# Patient Record
Sex: FEMALE | Race: WHITE | NOT HISPANIC OR LATINO | Employment: UNEMPLOYED | URBAN - METROPOLITAN AREA
[De-identification: names, ages, dates, MRNs, and addresses within clinical notes are randomized per-mention and may not be internally consistent; named-entity substitution may affect disease eponyms.]

---
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
## 2022-01-10 ENCOUNTER — TELEPHONE (OUTPATIENT)
Dept: OBGYN CLINIC | Facility: MEDICAL CENTER | Age: 22
End: 2022-01-10

## 2022-01-10 NOTE — PROGRESS NOTES
Subjective     Devi Groves is a 25 y o  y o  female  who presents for a postpartum visit  She is 6 weeks postpartum following a spontaneous vaginal delivery on 21  Pregnancy complicated by pregnancy while on medroxyprogesterone, asthma, bipolar, anxiety, tension headaches and gHTN  The delivery was at 44 gestational weeks  Labor and delivery was complicated by bilateral labial and first-degree laceration with repair  Female infant weighing 8 lb 8 5 oz with Apgars of 9/9  Postpartum course has been uncomplicated  Baby's course has been uncomplicated  Baby is feeding by formula  Bleeding no bleeding  Bowel function is normal  Bladder function is normal  Patient is sexually active  Contraception method is None  Postpartum depression screening: negative  Patient is already back to work  Is interested in discussing options for contraception  The following portions of the patient's history were reviewed and updated as appropriate: allergies, current medications, past family history, past medical history, past social history, past surgical history and problem list     Review of Systems  Pertinent items are noted in HPI       Objective     /78   Wt 156lb     General:   appears stated age, cooperative, alert normal mood and affect   Neck: Neck: normal, supple,trachea midline, no masses   Heart: regular rate and rhythm, S1, S2 normal, no murmur, click, rub or gallop   Lungs: clear to auscultation bilaterally     Assessment/Plan     6 week  postpartum exam    Pap smear due at age 24    4  Contraception: Reviewed all forms of contraception with patient  Patient is interested in restarting Depo-Provera injections  Reviewed with patient given pregnancy while on Depo-Provera would recommend other form of contraception  Patient is not interested in any other form at this time    Reviewed with patient will give Depo-Provera injections every 10 weeks instead of typical 12 weeks to attempt to prevent pregnancy      - UPT negative in office today     - will return in 10 weeks for next injection  Written information provided  2  Patient verbalizes understanding of support and [facility] opportunities available [facility]  Written information provided  3  Signs and symptoms to report reviewed    Follow up in: 10 weeks for Depo-Provera injection or as needed

## 2022-01-10 NOTE — PATIENT INSTRUCTIONS
Safe Sex Practices   WHAT YOU NEED TO KNOW:   What are safe sex practices? Safe sex practices are ways to prevent pregnancy and the spread of sexually transmitted infections (STIs)  An STI happens when a virus or bacteria are spread through sexual activity  Safe sex practices help decrease or prevent body fluid exchange during sex  Body fluids include saliva, urine, blood, vaginal fluids, and semen  Oral, vaginal, and anal sex can all spread STIs  What are some safe sex practices to follow before I have sex? · Talk to a new partner before you have sex  Tell your partner if you have an STI  Ask about his or her sex history and if he or she has a current or past STI  Your partner may need to be tested and treated  Do not have sex while you are being treated for an STI, or with a partner who is being treated  · Limit your number of sex partners  More than one sex partner can increase your risk for an STI  Do not have sex with anyone whose sex history you do not know  · Get tested for STIs if needed  Get tested if you had sex with someone who has an STI  Get tested if you have unprotected sex with any new partner  · Talk to your healthcare provider about birth control  Birth control can help prevent an unwanted pregnancy  There are many different types of birth control  Talk to your healthcare provider about which birth control method is right for you  · Ask about medicines to lower your risk for some STIs:      ? Vaccines  can help protect you from hepatitis A, hepatitis B, and the human papillomavirus (HPV)  The HPV vaccine is usually given at 11 years, but it may be given through 26 years to both females and males  Your provider can give you more information on vaccines to prevent STIs  ? Pre-exposure prophylaxis (PrEP)  may be given if you are at high risk for HIV  PrEP is taken every day to prevent the virus from fully infecting the body  · Do not use alcohol or drugs before sex    These can prevent you from thinking clearly and increase your risk for unsafe sex  What are some safe sex practices to follow while I am having sex? · Use condoms and barrier methods for all types of sexual contact  This includes oral, vaginal, and anal sex  Male and female condoms are available  Make sure that the condom fits and is put on correctly  Rubber latex sheets or dental dams can be used for oral sex  Use a new condom or latex barrier each time you have sex  Use latex condoms, if possible  Lambskin or natural condoms do not prevent STIs  If you or your partner is allergic to latex, use a nonlatex product, such as polyurethane  Use a second form of birth control with the condom to prevent pregnancy and STIs  Do not use male and female condoms together  · Only use water-based lubricants during sex  Water-based lubricants help prevent sores or cuts in the vagina or on the penis  Prevent sores or cuts to decrease your risk for an STI  Do not use oil-based lubricants, such as baby oil or hand lotion, with latex condoms or barriers  These will weaken the latex and may cause the condom to break  · Do not use chemicals that irritate your skin  Products that contain chemical irritants, such as spermicides, can irritate the lining of your vagina or rectum  Irritation may cause sores that can increase your risk for an STI  · Be careful when you have sex if you have open sores or cuts  Open sores or cuts may increase your risk for an STI  Keep all open sores or cuts covered during sex  Do not have oral sex if you have cuts or sores in your mouth  · Do not do activities that can pass germs  Do not use saliva as a lubricant or share sex toys  Where can I find more information? · [address] [address] (STEPHANE)  P O  [facility] , [address] [place] [address]  Web Address: http://www Moment/  org    When should I seek immediate care?    · A condom breaks, leaks, or slips off while you are having sex  · You notice sores on your penis, vagina, anal area, or the skin around them  · You have had unsafe sex and want to discuss emergency contraception or treatment for STI exposure  When should I call my doctor? · You think you or your female sex partner might be pregnant  · You have questions or concerns about your condition or care  CARE AGREEMENT:   You have the right to help plan your care  Learn about your health condition and how it may be treated  Discuss treatment options with your healthcare providers to decide what care you want to receive  You always have the right to refuse treatment  The above information is an  only  It is not intended as medical advice for individual conditions or treatments  Talk to your doctor, nurse or pharmacist before following any medical regimen to see if it is safe and effective for you  © Copyright [employer] 2021 Information is for End User's use only and may not be sold, redistributed or otherwise used for commercial purposes  All illustrations and images included in CareNotes® are the copyrighted property of A D A M , Inc  or [facility] Jimmy Yanes   Postpartum Depression   WHAT YOU NEED TO KNOW:   What is postpartum depression? Postpartum depression is a mood disorder that occurs after your baby is born  Your symptoms may last up to 12 months after delivery  Your symptoms may become serious and affect your daily activities and relationships  What are the symptoms of postpartum depression?    · Feeling sad, anxious, tearful, discouraged, hopeless, or alone    · Thoughts that you are not a good mother    · Trouble completing daily tasks, concentrating, or remembering things    · Lack of appetite    · Lack interest in your baby    · Feeling restless, irritable, or withdrawn    · An overwhelmed feeling with your new baby and a belief that it will not get better    · Feeling unimportant or guilty most of the time    · Trouble sleeping, even after the baby is asleep    What causes postpartum depression? The exact cause is not known  Hormone levels that increased during pregnancy suddenly drop after your baby is born  This can cause your symptoms  A past episode of postpartum depression or a family history of depression may increase your risk  Postpartum depression may be triggered by giving birth to more than 1 baby  Postpartum depression may also be trigged by a lack of support at home, stress, or medical problems  How is postpartum depression diagnosed? Healthcare providers will talk to you about how you are feeling and ask if you have any depression  These talks can happen during appointments for your medical care and for your baby's care, such as well child visits  Talk to your providers about the following:  · When you started to feel depressed, and if it is getting worse over time    · Problems you are having with daily activities, sleep, or caring for your baby    · If anything makes your depression worse, or makes you feel better    · Feeling that you are not bonding with your baby the way you want    · Any problems your baby has with sleeping or feeding    · If your baby is fussy or cries a lot    · Support you have from friends, family, or others    How is postpartum depression treated? Treatment may include medicine, talk therapy, or both  · A therapist  can help you find ways to cope with your feelings  This can be done alone or in a group  · Antidepressants  help decrease or stop your symptoms  What can I do to feel better? You may feel better quickly, or if may take a few weeks to feel better  Be patient with yourself  Do the following to take care of yourself:  · Rest as needed  Take a nap or rest while your baby sleeps  Ask someone to watch your baby while you nap  · Get emotional support  Share your feelings with your partner, a friend, or another mother   Ask your partner, friends, or family to help with cooking or cleaning  Do only what is needed and let other things wait until later  · Exercise when you can  Even 5 or 10 minutes of exercise can help improve your mood  Walk around the block or do some stretching exercises  · Eat a variety of healthy foods  Healthy foods include fruits, vegetables, whole-grain breads, low-fat dairy products, beans, lean meats, and fish  Do not skip meals  · Take care of yourself  Shower and dress each day  Write in a journal  Celebrate small achievements, even if it is only 1 thing a day  Try to get out of the house a little each day  Meet a friend for coffee  Call your local emergency number (911 in the [address]) if:   · You think about hurting yourself or your baby  When should I seek immediate care? · Your feelings of depression or sadness are strong  Call your doctor if:   · Your symptoms last most of the day for days in a row  · Your symptoms last more than 1 week  · You have questions or concerns about your condition or care  CARE AGREEMENT:   You have the right to help plan your care  Learn about your health condition and how it may be treated  Discuss treatment options with your healthcare providers to decide what care you want to receive  You always have the right to refuse treatment  The above information is an  only  It is not intended as medical advice for individual conditions or treatments  Talk to your doctor, nurse or pharmacist before following any medical regimen to see if it is safe and effective for you  © Copyright [employer] 2021 Information is for End User's use only and may not be sold, redistributed or otherwise used for commercial purposes  All illustrations and images included in CareNotes® are the copyrighted property of A D A M , Inc  or Waldemar Kraus  Postpartum Bleeding   WHAT YOU NEED TO KNOW:   What do I need to know about postpartum bleeding?   Postpartum bleeding is vaginal bleeding after childbirth  This bleeding is normal, whether your baby was born vaginally or by   It contains blood and the tissue that lined the inside of your uterus when you were pregnant  What should I expect with postpartum bleeding? Postpartum bleeding usually lasts at least 10 days, and may last longer than 6 weeks  Your bleeding may range from light (barely staining a pad) to heavy (soaking a pad in 1 hour)  Usually, you have heavier bleeding right after childbirth, which slows over the next few weeks until it stops  The bleeding is red or dark brown with clots for the first 1 to 3 days  It then turns pink for several days, and then becomes a white or yellow discharge until it ends  Call your local emergency number (911 in the [address]) if:   · You are suddenly short of breath and feel lightheaded  · You have sudden chest pain  · You are breathing faster than normal     · Your heart is beating faster than normal     When should I call my doctor or obstetrician? · Your bleeding increases, or you have heavy bleeding that soaks 1 pad in 1 hour for 2 hours in a row  · You have a fever  · You pass large blood clots  · You feel dizzy  · You have a low back ache, abdominal pain or tenderness, or loss of appetite  · You urinate less than usual, or not at all  · You have questions or concerns about your condition or care  CARE AGREEMENT:   You have the right to help plan your care  Learn about your health condition and how it may be treated  Discuss treatment options with your healthcare providers to decide what care you want to receive  You always have the right to refuse treatment  The above information is an  only  It is not intended as medical advice for individual conditions or treatments  Talk to your doctor, nurse or pharmacist before following any medical regimen to see if it is safe and effective for you    ©  [employer]  Information is for End User's use only and may not be sold, redistributed or otherwise used for commercial purposes  All illustrations and images included in CareNotes® are the copyrighted property of A D A M , Inc  or Waldemar Kraus  Methylprednisolone (By injection)   Methylprednisolone (meth-il-pred-NIS-oh-lone)  Treats inflammation, severe allergies, flare-ups of ongoing illnesses, and many other medical problems  May also be used to decrease some symptoms of cancer  This medicine is a corticosteroid (cortisone-like medicine or steroid)  Brand Name(s): DEPO-Medrol, DEPO-Medrol Novaplus, PremierPro Rx SOLU-Medrol 125mg Vial, PremierPro Rx SOLU-Medrol [address] [facility], PremierPro Rx SOLU-Medrol 40mg Vial, SOLU-Medrol, SOLU-Medrol 125mg Vial, SOLU-Medrol 1GM Vial, SOLU-Medrol 40mg Vial, SOLU-Medrol 500mg Vial, SOLU-Medrol Novaplus 125mg Vial, SOLU-Medrol Novaplus 40mg Vial   There may be other brand names for this medicine  When This Medicine Should Not Be Used: This medicine is not right for everyone  You should not receive it if you had an allergic reaction to methylprednisolone, cow's milk or other dairy products, or if you have a fungus infection that affects your whole body  You should not have this medicine injected into a muscle if you have idiopathic thrombocytopenic purpura  Depo-Medrol® and some strengths of Solu-Medrol® contain benzyl alcohol and should not be used in premature babies  How to Use This Medicine:   Injectable  A nurse or other trained health professional will give you this medicine  It may be given through a needle placed into one of your veins, as a shot into a muscle or joint, or as a shot into a lesion on your skin  A nurse or other health provider will give you this medicine  Your doctor may give you a few doses of this medicine until your condition improves, then switch you to an oral medicine that works the same way  If you have any concerns about this, talk to your doctor  Missed dose:  You must use this medicine on a fixed schedule  Call your doctor or pharmacist if you miss a dose  Drugs and Foods to Avoid:   Ask your doctor or pharmacist before using any other medicine, including over-the-counter medicines, vitamins, and herbal products  Some medicines can affect how methylprednisolone medicine works  Tell your doctor if you are using any of the following:  Aminoglutethimide, amphotericin B, azithromycin, carbamazepine, cholestyramine, clarithromycin, cyclosporine, digoxin, erythromycin, isoniazid, ketoconazole, phenobarbital, phenytoin, rifampin, troleandomycin  Birth control pills (including estrogen)  Blood thinner (including warfarin)  Diabetes medicine  Diuretic (water pill)  NSAID pain or arthritis medicine (including aspirin, celecoxib, diclofenac, ibuprofen, naproxen)  This medicine may interfere with vaccines  Ask your doctor before you get a flu shot or any other vaccines  Warnings While Using This Medicine:   Tell your doctor if you are pregnant or breastfeeding, or if you have kidney disease, liver disease, adrenal gland problems, cataracts, congestive heart failure, diabetes, glaucoma, high blood pressure, mental health problems, stomach or bowel problems (including ulcers, ulcerative colitis), myasthenia gravis, osteoporosis, thyroid problems, brain injury, or a recent heart attack  Tell your doctor if you have an infection (including herpes eye infection, tuberculosis, or threadworm), or if you have recently spent time in a tropical climate  Also tell your doctor if you have a recent exposure to chickenpox or measles  If this medicine is being injected into a joint, tell your doctor about any other problems you have had with that joint    This medicine may cause the following problems:  Increased risk of infection  Changes in vision  Changes in mood or behavior  Slow growth in children  High blood pressure  Liver problems  Bone problems (including osteoporosis)  Stomach or bowel perforation (tear or hole)  Increased risk for cancer (including Kaposi's sarcoma)  Muscle problems  If you use this medicine for a long time, tell your doctor about any extra stress or anxiety in your life, including other health concerns and emotional stress  Your dose might need to be changed for a short time while you have extra stress  Do not stop using this medicine suddenly  Your doctor will need to slowly decrease your dose before you stop it completely  Tell any doctor or dentist who treats you that you are using this medicine  This medicine may affect certain medical test results  Your doctor will do lab tests at regular visits to check on the effects of this medicine  Keep all appointments  Possible Side Effects While Using This Medicine:   Call your doctor right away if you notice any of these side effects: Allergic reaction: Itching or hives, swelling in your face or hands, swelling or tingling in your mouth or throat, chest tightness, trouble breathing  Blurred vision, eye pain, changes in vision  Changes in how much or how often you urinate, increased hunger or thirst  Color changes on the skin, dark freckles, easy bruising, muscle weakness, round, puffy face  Dark urine or pale stools, nausea, vomiting, loss of appetite, stomach pain, yellow skin or eyes  Dry mouth, increased thirst, nausea, vomiting  Fast, slow, pounding, or uneven heartbeat  Fever, chills, cough, sore throat, body aches  Mood swings, unusual thoughts or behavior  Muscle pain, weakness, or cramps, sudden joint pain  Swelling in your hands, ankles, or feet  Unusual bleeding or bruising  If you notice these less serious side effects, talk with your doctor:   Diarrhea  Easy bruising  Red, pink, purple, or brown flat spots or bumps on your skin  Skin looks sunken or indented where the shot was given  If you notice other side effects that you think are caused by this medicine, tell your doctor  Call your doctor for medical advice about side effects  You may report side effects to FDA at 5-340-FDA-0430    © Copyright [employer] [facility] 2021 Information is for End User's use only and may not be sold, redistributed or otherwise used for commercial purposes  The above information is an  only  It is not intended as medical advice for individual conditions or treatments  Talk to your doctor, nurse or pharmacist before following any medical regimen to see if it is safe and effective for you

## 2022-01-10 NOTE — TELEPHONE ENCOUNTER
Patient's mom called in regards to concerns of patient  Pt has had heavy bleeding and clotting the last few days and mother stated pt is using "pad after pad after pad" to keep up with the amount of bleeding  Pt did wake up this morning with a fever and feeling dizzy  Consulted with Dr Isaac Landry who suggested patient be seen in ED to evaluate blood loss and fever  Pt agreed to this treatment plan

## 2022-01-12 ENCOUNTER — POSTPARTUM VISIT (OUTPATIENT)
Dept: OBGYN CLINIC | Facility: MEDICAL CENTER | Age: 22
End: 2022-01-12
Payer: COMMERCIAL

## 2022-01-12 VITALS
BODY MASS INDEX: 25.07 KG/M2 | SYSTOLIC BLOOD PRESSURE: 116 MMHG | HEIGHT: 66 IN | WEIGHT: 156 LBS | DIASTOLIC BLOOD PRESSURE: 78 MMHG

## 2022-01-12 DIAGNOSIS — Z30.013 ENCOUNTER FOR INITIAL PRESCRIPTION OF INJECTABLE CONTRACEPTIVE: ICD-10-CM

## 2022-01-12 PROBLEM — Z34.03 SUPERVISION OF NORMAL FIRST TEEN PREGNANCY IN THIRD TRIMESTER: Status: RESOLVED | Noted: 2021-06-15 | Resolved: 2022-01-12

## 2022-01-12 PROBLEM — O13.9 GESTATIONAL HYPERTENSION: Status: RESOLVED | Noted: 2021-12-02 | Resolved: 2022-01-12

## 2022-01-12 LAB — SL AMB POCT URINE HCG: NEGATIVE

## 2022-01-12 PROCEDURE — 99213 OFFICE O/P EST LOW 20 MIN: CPT | Performed by: NURSE PRACTITIONER

## 2022-01-12 PROCEDURE — 1036F TOBACCO NON-USER: CPT | Performed by: NURSE PRACTITIONER

## 2022-01-12 PROCEDURE — 3008F BODY MASS INDEX DOCD: CPT | Performed by: NURSE PRACTITIONER

## 2022-01-12 PROCEDURE — 81025 URINE PREGNANCY TEST: CPT | Performed by: NURSE PRACTITIONER

## 2022-01-12 RX ORDER — MEDROXYPROGESTERONE ACETATE 150 MG/ML
150 INJECTION, SUSPENSION INTRAMUSCULAR SEE ADMIN INSTRUCTIONS
Status: DISCONTINUED | OUTPATIENT
Start: 2022-01-12 | End: 2022-06-09

## 2022-01-12 RX ORDER — MEDROXYPROGESTERONE ACETATE 150 MG/ML
INJECTION, SUSPENSION INTRAMUSCULAR
Qty: 1 ML | Refills: 0 | Status: SHIPPED | OUTPATIENT
Start: 2022-01-12 | End: 2022-03-23 | Stop reason: SDUPTHER

## 2022-01-12 RX ORDER — MEDROXYPROGESTERONE ACETATE 150 MG/ML
150 INJECTION, SUSPENSION INTRAMUSCULAR SEE ADMIN INSTRUCTIONS
Status: CANCELLED | OUTPATIENT
Start: 2022-01-12

## 2022-01-12 RX ADMIN — MEDROXYPROGESTERONE ACETATE 150 MG: 150 INJECTION, SUSPENSION INTRAMUSCULAR at 12:56

## 2022-02-08 ENCOUNTER — POSTPARTUM VISIT (OUTPATIENT)
Dept: OBGYN CLINIC | Facility: MEDICAL CENTER | Age: 22
End: 2022-02-08
Payer: COMMERCIAL

## 2022-02-08 VITALS
SYSTOLIC BLOOD PRESSURE: 100 MMHG | WEIGHT: 155 LBS | BODY MASS INDEX: 24.91 KG/M2 | HEIGHT: 66 IN | DIASTOLIC BLOOD PRESSURE: 70 MMHG

## 2022-02-08 DIAGNOSIS — M62.89 PELVIC FLOOR DYSFUNCTION: Primary | ICD-10-CM

## 2022-02-08 PROCEDURE — 99214 OFFICE O/P EST MOD 30 MIN: CPT | Performed by: OBSTETRICS & GYNECOLOGY

## 2022-02-08 PROCEDURE — 3008F BODY MASS INDEX DOCD: CPT | Performed by: NURSE PRACTITIONER

## 2022-02-09 NOTE — PROGRESS NOTES
[facility] [address] [place] [address]    Assessment/Plan:  No problem-specific Assessment & Plan notes found for this encounter  Diagnoses and all orders for this visit:    Pelvic floor dysfunction  -     Ambulatory Referral to Physical Therapy; Future          Subjective:   Edilberto Agarwal is a 25 y o  [place] female  CC: pelvic pressure    HPI: HPI  Patient is a 72-year-old  presenting with complaints of vaginal pressure  She has states she feels as though something is falling out  She states she cannot feel any tissue protruding from the vagina however has the sensation  She denies any urinary or bowel movement complaints  ROS: Review of Systems   Constitutional: Negative  HENT: Negative  Eyes: Negative  Respiratory: Negative  Cardiovascular: Negative  Gastrointestinal: Negative  Genitourinary: Pelvic pain: Pelvic pressure  Musculoskeletal: Negative  All other systems reviewed and are negative  PFSH: The following portions of the patient's history were reviewed and updated as appropriate: allergies, current medications, past family history, past medical history, obstetric history, gynecologic history, past social history, past surgical history and problem list        Objective:  /70   Ht 5' 5 5" (1 664 m)   Wt 70 3 kg (155 lb)   LMP 2022   Breastfeeding No   BMI 25 40 kg/m²    Physical Exam  Vitals reviewed  Constitutional:       Appearance: Normal appearance  Cardiovascular:      Rate and Rhythm: Normal rate  Pulmonary:      Effort: Pulmonary effort is normal  No respiratory distress  Genitourinary:     General: Normal vulva  Exam position: Lithotomy position  Urethra: No prolapse  Vagina: No prolapsed vaginal walls        Cervix: Normal       Uterus: Normal        Adnexa: Right adnexa normal and left adnexa normal       Comments: Decrease muscle tone  Neurological:      Mental Status: She is alert    Psychiatric:         Mood and Affect: Mood normal          Behavior: Behavior normal

## 2022-03-01 ENCOUNTER — AMB VIDEO VISIT (OUTPATIENT)
Dept: OTHER | Facility: HOSPITAL | Age: 22
End: 2022-03-01

## 2022-03-01 DIAGNOSIS — J03.90 EXUDATIVE TONSILLITIS: Primary | ICD-10-CM

## 2022-03-01 PROBLEM — Z20.822 ENCOUNTER BY TELEHEALTH FOR SUSPECTED COVID-19: Status: RESOLVED | Noted: 2021-09-24 | Resolved: 2022-03-01

## 2022-03-01 PROBLEM — J01.40 ACUTE NON-RECURRENT PANSINUSITIS: Status: RESOLVED | Noted: 2021-09-24 | Resolved: 2022-03-01

## 2022-03-01 PROBLEM — Z30.013 ENCOUNTER FOR INITIAL PRESCRIPTION OF INJECTABLE CONTRACEPTIVE: Status: RESOLVED | Noted: 2022-01-12 | Resolved: 2022-03-01

## 2022-03-01 PROCEDURE — ECARE PR SL URGENT CARE VIRTUAL VISIT: Performed by: PHYSICIAN ASSISTANT

## 2022-03-01 RX ORDER — AMOXICILLIN 500 MG/1
500 TABLET, FILM COATED ORAL 2 TIMES DAILY
Qty: 20 TABLET | Refills: 0 | Status: SHIPPED | OUTPATIENT
Start: 2022-03-01 | End: 2022-03-11

## 2022-03-01 NOTE — CARE ANYWHERE EVISITS
Visit Summary for Uday Burgess - Gender: Female - Date of Birth: [birth year]  Date: [id number] - Duration: 15 minutes  Patient: Uday Burgess  Provider: Callie Manning PA-C    Patient Contact Information  Address  [address] [place] [address]; [address] [place] [address]  [phone]    Visit Topics  Strep throat [Added By: Self - 2022-03-01]    Triage Questions   What is your current physical address in the event of a medical emergency? Answer []  Are you allergic to any medications? Answer []  Are you now or could you be pregnant? Answer []  Do you have any immune system compromise or chronic lung   disease? Answer []  Do you have any vulnerable family members in the home (infant, pregnant, cancer, elderly)? Answer []     Conversation Transcripts  [0A][0A] [Notification] You are connected with Callie Manning PA-C, Urgent Care Specialist [0A][Notification] Uday Burgess is located in [address] [place] [address]  [0A][Notification] Uday Burgess has shared health history  [place] Kim  [0A]    Diagnosis  Acute tonsillitis, unspecified    Procedures  Value: [id number] Code: CPT-4 UNLISTED E&M SERVICE    Medications Prescribed    No prescriptions ordered    Electronically signed by: Norma Garcia(NPI [id number])

## 2022-03-01 NOTE — PROGRESS NOTES
Video Visit - Edilberto Agarwal 25 y o  female MRN: [id number]    REQUIRED DOCUMENTATION:         1  This service was provided via [employer]  2  Provider located at [address] [place], [place] [id number]  3  [facility] provider: Prince Abelardo PA-C   4  Identify all parties in room with patient during [facility] visit:  significant other-permission granted and child(fely)- permission granted  5  After connecting through [employer], patient was identified by name and date of birth  Patient was then informed that this was a Telemedicine visit and that the exam was being conducted confidentially over secure lines  My office door was closed  No one else was in the room  Patient acknowledged consent and understanding of privacy and security of the Telemedicine visit  I informed the patient that I have reviewed their record in Epic and presented the opportunity for them to ask any questions regarding the visit today  The patient agreed to participate  VITALS: Heart Rate: 80 BPM, Respiratory Rate: 20 RPM, Temperature 99 9° F, Blood Pressure Unavailable mmHg, Pulse Ox unavailable % on RA    HPI  Pt reports R tonsil pain and filled with pus and pain radiated into ear starting 4 days ago, now pain and pus on the L side  Feeling feverish  Took advil 30 min ago with some relief  Able to eat and drink and breath  Reports shooting pain behind leg similar to her prior flu/covid sx (body aches), headache  No hx DVT  Not a smoker  2 months post-partum  Physical Exam  Constitutional:       General: She is not in acute distress  Appearance: Normal appearance  She is not toxic-appearing  HENT:      Head: Normocephalic and atraumatic  Nose: No rhinorrhea  Mouth/Throat:      Mouth: Mucous membranes are moist       Pharynx: Uvula midline  No oropharyngeal exudate, posterior oropharyngeal erythema or uvula swelling  Tonsils: Tonsillar exudate present  2+ on the right  2+ on the left  Eyes:      Conjunctiva/sclera: Conjunctivae normal    Pulmonary:      Effort: Pulmonary effort is normal  No respiratory distress  Breath sounds: No wheezing (no gross audible wheeze through computer)  Musculoskeletal:      Cervical back: Normal range of motion  Lymphadenopathy:      Cervical: Cervical adenopathy (anterior, denies posterior) present  Skin:     Findings: No rash (on face or neck)  Neurological:      Mental Status: She is alert  Cranial Nerves: No dysarthria or facial asymmetry  Psychiatric:         Mood and Affect: Mood normal          Behavior: Behavior normal          Diagnoses and all orders for this visit:    Exudative tonsillitis  -     amoxicillin (AMOXIL) 500 MG tablet; Take 1 tablet (500 mg total) by mouth 2 (two) times a day for 10 days  -     Throat culture; Future  -     Mononucleosis screen; Future      Patient Instructions     Go to [facility] for Throat Culture and Mono test    Go to the emergency department if you have worsening swelling, difficulty swallowing, or difficulty breathing  Please schedule follow-up appointment with your primary care physician within the next 1-2 business days for recheck  Tonsillitis   WHAT YOU NEED TO KNOW:   Tonsillitis is inflammation of your tonsils  Tonsils are the lumps of tissue on both sides of the back of your throat  Tonsils are part of your immune system  They help you fight infections  Recurrent tonsillitis is when you have tonsillitis many times in 1 year  Chronic tonsillitis is when you have a sore throat that lasts 3 months or longer  DISCHARGE INSTRUCTIONS:   Medicines: You may need any of the following:  · Acetaminophen  decreases pain and fever  It is available without a doctor's order  Ask how much to take and how often to take it  Follow directions  Acetaminophen can cause liver damage if not taken correctly  · NSAIDs , such as ibuprofen, help decrease swelling, pain, and fever   This medicine is available with or without a doctor's order  NSAIDs can cause stomach bleeding or kidney problems in certain people  If you take blood thinner medicine, always ask your healthcare provider if NSAIDs are safe for you  Always read the medicine label and follow directions  · Antibiotics  help treat a bacterial infection  · Take your medicine as directed  Contact your healthcare provider if you think your medicine is not helping or if you have side effects  Tell him or her if you are allergic to any medicine  Keep a list of the medicines, vitamins, and herbs you take  Include the amounts, and when and why you take them  Bring the list or the pill bottles to follow-up visits  Carry your medicine list with you in case of an emergency  Call 911 for the following:   · You have trouble breathing because your tonsils are swollen  Contact your healthcare provider if:   · You have a fever  · Your pain gets worse or does not get better after you take pain medicine  · Your sore throat is not better after you have finished antibiotic treatment  · You have trouble sleeping and wake up trying to catch your breath  · You have questions or concerns about your condition or care  Rest  when you feel it is needed  Slowly start to do more each day  Return to your daily activities as directed  Drink liquids as directed: You may need to drink more liquid than usual to help prevent dehydration  Ask how much liquid to drink each day and which liquids are best for you  Gargle with warm salt water: This may help decrease throat pain  Mix 1 teaspoon of salt in 8 ounces of warm water  Ask how often you should do this  Prevent the spread of germs:  Wash your hands often  Do not share food or drinks with anyone  You may be able to return to work when you feel better and your fever is gone for at least 24 hours    Follow up with your doctor as directed:  Write down your questions so you remember to ask them during your visits  © Copyright [employer] 2022 Information is for End User's use only and may not be sold, redistributed or otherwise used for commercial purposes  All illustrations and images included in CareNotes® are the copyrighted property of [employer]  or Waldemar Kraus  The above information is an  only  It is not intended as medical advice for individual conditions or treatments  Talk to your doctor, nurse or pharmacist before following any medical regimen to see if it is safe and effective for you

## 2022-03-01 NOTE — PATIENT INSTRUCTIONS
Go to [facility] for Throat Culture and Mono test    Go to the emergency department if you have worsening swelling, difficulty swallowing, or difficulty breathing  Please schedule follow-up appointment with your primary care physician within the next 1-2 business days for recheck  Tonsillitis   WHAT YOU NEED TO KNOW:   Tonsillitis is inflammation of your tonsils  Tonsils are the lumps of tissue on both sides of the back of your throat  Tonsils are part of your immune system  They help you fight infections  Recurrent tonsillitis is when you have tonsillitis many times in 1 year  Chronic tonsillitis is when you have a sore throat that lasts 3 months or longer  DISCHARGE INSTRUCTIONS:   Medicines: You may need any of the following:  · Acetaminophen  decreases pain and fever  It is available without a doctor's order  Ask how much to take and how often to take it  Follow directions  Acetaminophen can cause liver damage if not taken correctly  · NSAIDs , such as ibuprofen, help decrease swelling, pain, and fever  This medicine is available with or without a doctor's order  NSAIDs can cause stomach bleeding or kidney problems in certain people  If you take blood thinner medicine, always ask your healthcare provider if NSAIDs are safe for you  Always read the medicine label and follow directions  · Antibiotics  help treat a bacterial infection  · Take your medicine as directed  Contact your healthcare provider if you think your medicine is not helping or if you have side effects  Tell him or her if you are allergic to any medicine  Keep a list of the medicines, vitamins, and herbs you take  Include the amounts, and when and why you take them  Bring the list or the pill bottles to follow-up visits  Carry your medicine list with you in case of an emergency  Call 911 for the following:   · You have trouble breathing because your tonsils are swollen        Contact your healthcare provider if:   · You have a fever     · Your pain gets worse or does not get better after you take pain medicine  · Your sore throat is not better after you have finished antibiotic treatment  · You have trouble sleeping and wake up trying to catch your breath  · You have questions or concerns about your condition or care  Rest  when you feel it is needed  Slowly start to do more each day  Return to your daily activities as directed  Drink liquids as directed: You may need to drink more liquid than usual to help prevent dehydration  Ask how much liquid to drink each day and which liquids are best for you  Gargle with warm salt water: This may help decrease throat pain  Mix 1 teaspoon of salt in 8 ounces of warm water  Ask how often you should do this  Prevent the spread of germs:  Wash your hands often  Do not share food or drinks with anyone  You may be able to return to work when you feel better and your fever is gone for at least 24 hours  Follow up with your doctor as directed:  Write down your questions so you remember to ask them during your visits  © Copyright [employer] 2022 Information is for End User's use only and may not be sold, redistributed or otherwise used for commercial purposes  All illustrations and images included in CareNotes® are the copyrighted property of [employer]  or [facility] Jimmy Yanes   The above information is an  only  It is not intended as medical advice for individual conditions or treatments  Talk to your doctor, nurse or pharmacist before following any medical regimen to see if it is safe and effective for you

## 2022-03-23 ENCOUNTER — CLINICAL SUPPORT (OUTPATIENT)
Dept: OBGYN CLINIC | Facility: MEDICAL CENTER | Age: 22
End: 2022-03-23
Payer: COMMERCIAL

## 2022-03-23 ENCOUNTER — TELEPHONE (OUTPATIENT)
Dept: OBGYN CLINIC | Facility: MEDICAL CENTER | Age: 22
End: 2022-03-23

## 2022-03-23 DIAGNOSIS — Z30.013 ENCOUNTER FOR INITIAL PRESCRIPTION OF INJECTABLE CONTRACEPTIVE: ICD-10-CM

## 2022-03-23 DIAGNOSIS — Z30.42 ENCOUNTER FOR MANAGEMENT AND INJECTION OF DEPO-PROVERA: Primary | ICD-10-CM

## 2022-03-23 LAB — SL AMB POCT URINE HCG: NORMAL

## 2022-03-23 PROCEDURE — 96372 THER/PROPH/DIAG INJ SC/IM: CPT | Performed by: OBSTETRICS & GYNECOLOGY

## 2022-03-23 PROCEDURE — 81025 URINE PREGNANCY TEST: CPT | Performed by: OBSTETRICS & GYNECOLOGY

## 2022-03-23 RX ORDER — MEDROXYPROGESTERONE ACETATE 150 MG/ML
INJECTION, SUSPENSION INTRAMUSCULAR
Qty: 1 ML | Refills: 0 | Status: SHIPPED | OUTPATIENT
Start: 2022-03-23 | End: 2022-03-23 | Stop reason: SDUPTHER

## 2022-03-23 RX ORDER — MEDROXYPROGESTERONE ACETATE 150 MG/ML
INJECTION, SUSPENSION INTRAMUSCULAR
Qty: 1 ML | Refills: 3 | Status: SHIPPED | OUTPATIENT
Start: 2022-03-23 | End: 2022-06-09 | Stop reason: ALTCHOICE

## 2022-03-23 RX ADMIN — MEDROXYPROGESTERONE ACETATE 150 MG: 150 INJECTION, SUSPENSION INTRAMUSCULAR at 12:56

## 2022-03-23 NOTE — PROGRESS NOTES
Nica Kalpana is here for her depo inj  Deop given im  LEFT DELTOID    UPT neg in office   Pt sat in office for 15 min before leaving with no issues  [id number]  [id number]  EXP-07/2024  PT SUPPLIED

## 2022-06-09 ENCOUNTER — ANNUAL EXAM (OUTPATIENT)
Dept: OBGYN CLINIC | Facility: MEDICAL CENTER | Age: 22
End: 2022-06-09
Payer: COMMERCIAL

## 2022-06-09 VITALS
BODY MASS INDEX: 26.97 KG/M2 | WEIGHT: 167.8 LBS | SYSTOLIC BLOOD PRESSURE: 116 MMHG | HEIGHT: 66 IN | DIASTOLIC BLOOD PRESSURE: 74 MMHG

## 2022-06-09 DIAGNOSIS — Z30.42 ENCOUNTER FOR MANAGEMENT AND INJECTION OF DEPO-PROVERA: ICD-10-CM

## 2022-06-09 DIAGNOSIS — Z30.011 ENCOUNTER FOR BCP (BIRTH CONTROL PILLS) INITIAL PRESCRIPTION: ICD-10-CM

## 2022-06-09 DIAGNOSIS — Z01.419 ENCOUNTER FOR GYNECOLOGICAL EXAMINATION WITHOUT ABNORMAL FINDING: Primary | ICD-10-CM

## 2022-06-09 PROCEDURE — 0503F POSTPARTUM CARE VISIT: CPT | Performed by: ADVANCED PRACTICE MIDWIFE

## 2022-06-09 PROCEDURE — 99213 OFFICE O/P EST LOW 20 MIN: CPT | Performed by: ADVANCED PRACTICE MIDWIFE

## 2022-06-09 RX ORDER — DROSPIRENONE AND ETHINYL ESTRADIOL 0.02-3(28)
1 KIT ORAL DAILY
Qty: 28 TABLET | Refills: 4 | Status: SHIPPED | OUTPATIENT
Start: 2022-06-09 | End: 2022-06-30

## 2022-06-09 NOTE — PROGRESS NOTES
Assessment Diagnoses and all orders for this visit:    Encounter for gynecological examination without abnormal finding    Encounter for management and injection of depo-Provera  - states that she wants to stop Depo  Reviewed options, see below  Encounter for BCP (birth control pills) initial prescription  -     drospirenone-ethinyl estradiol (ALY) 3-0 02 MG per tablet; Take 1 tablet by mouth daily    [facility] feels that an OCP is best option for current lifestyle  Risk/benefits, side effects and administration reviewed  Questions asked and answered  Verbalizes understanding  Reviewed starting of OCP and importance of taking on time daily  Verbalizes understanding    - To call if any problems  - RTO 3 months for follow-up  Plan  23 y o  female here to discuss stopping Depo Provera and starting a birth control pill       Matty Gallagher is a 23 y o  female who presents for contraception counseling  The patient does have complaints today  The patient is sexually active  Pertinent past medical history: none  [facility] presents for gyn exam today  5/25/22 LMP  Menses is heavy and then light and states that she is still bleeding  Has not used back-up birth control  Using DepoProvera as birth control method was supposed to use on a 10 week schedule instead of 12 weeks and is already 11 weeks  States that due to the bleeding and acne she would like to switch to a different birth control   Declined IUD  Reviewed all forms of birth control  Risk/benefit, side effects, and administration  At this time wants to start and OCP  5-8 hrs sleep per day  2-3 servings of calcium rich food per day  No routine exercise per week  3 servings of caffeine per day       Patient Active Problem List   Diagnosis    Allergic rhinitis    Asthma    Dysmenorrhea    Generalized anxiety disorder    Mood disorder (HCC)    Chronic tension-type headache, not intractable       Past Medical History:   Diagnosis Date    Asthma     Bipolar disorder ([facility] [address] )     Chlamydia     COVID-19 virus infection 05/19/2021    Migraine        Past Surgical History:   Procedure Laterality Date    TYMPANOPLASTY Right     TYMPANOSTOMY TUBE PLACEMENT         Family History   Problem Relation Age of Onset    Multiple sclerosis Mother     Thyroid disease Mother     Fibromyalgia Mother    Katja Sang Migraines Mother     Depression Mother     Kidney cancer Maternal Grandmother     Heart attack Maternal Grandmother     Lupus Maternal Grandmother     Skin cancer Maternal Grandmother     Heart disease Maternal Grandmother     Diabetes Maternal Grandfather     Diabetes Maternal Aunt     Polycystic ovary syndrome Maternal Aunt     Depression Maternal Aunt     Anxiety disorder Maternal Aunt     Cancer Maternal Uncle     Depression Father     Bipolar disorder Father     No Known Problems Paternal Grandmother     Cancer Paternal Grandfather     Asperger's syndrome Half-Brother     Suicidality Half-Brother     Mental illness Neg Hx     Substance Abuse Neg Hx     Breast cancer Neg Hx     Colon cancer Neg Hx     Ovarian cancer Neg Hx        Social History     Socioeconomic History    Marital status: Single     Spouse name: Not on file    Number of children: Not on file    Years of education: Not on file    Highest education level: Not on file   Occupational History    Not on file   Tobacco Use    Smoking status: Never Smoker    Smokeless tobacco: Never Used   Vaping Use    Vaping Use: Former    Quit date: 1/1/2021    Substances: Nicotine, Flavoring   Substance and Sexual Activity    Alcohol use: No    Drug use: Never    Sexual activity: Yes     Partners: Male     Birth control/protection: Injection, None   Other Topics Concern    Not on file   Social History Narrative    Not on file     Social Determinants of Health     Financial Resource Strain: Not on file   Food Insecurity: Not on file   Transportation Needs: Not on file   Physical Activity: Not on file   Stress: Not on file   Social Connections: Not on file   Intimate Partner Violence: Not on file   Housing Stability: Not on file          Current Outpatient Medications:     drospirenone-ethinyl estradiol (ALY) 3-0 02 MG per tablet, Take 1 tablet by mouth daily, Disp: 28 tablet, Rfl: 4    ibuprofen (MOTRIN) 600 mg tablet, Take 1 tablet (600 mg total) by mouth every 6 (six) hours as needed for mild pain (Patient not taking: No sig reported), Disp: 30 tablet, Rfl: 0  No current facility-administered medications for this visit  Allergies   Allergen Reactions    Sulfa Antibiotics Vomiting    Latex Rash       Review of Systems  Constitutional :no fever, feels well, no tiredness, no recent weight gain or loss  ENT: no ear ache, no loss of hearing, no nosebleeds or nasal discharge, no sore throat or hoarseness  Cardiovascular: no complaints of slow or fast heart beat, no chest pain, no palpitations, no leg claudication or lower extremity edema  Respiratory: no complaints of shortness of shortness of breath, no HAWLEY  Breasts:no complaints of breast pain, breast lump, or nipple discharge  Gastrointestinal: no complaints of abdominal pain, constipation, nausea, vomiting, or diarrhea or bloody stools  Genitourinary : no complaints of dysuria, incontinence, pelvic pain, no dysmenorrhea, vaginal discharge or abnormal vaginal bleeding and as noted in HPI  Musculoskeletal: no complaints of arthralgia, no myalgia, no joint swelling or stiffness, no limb pain or swelling    Integumentary: no complaints of skin rash or lesion, itching or dry skin  Neurological: no complaints of headache, no confusion, no numbness or tingling, no dizziness or fainting    Objective     /74   Ht 5' 5 5" (1 664 m)   Wt 76 1 kg (167 lb 12 8 oz)   LMP 05/25/2022 (Approximate)   BMI 27 50 kg/m²     General:   appears stated age, cooperative, alert normal mood and affect   Neck: normal, supple,trachea midline, no masses   Heart: regular rate and rhythm, S1, S2 normal, no murmur, click, rub or gallop   Lungs: clear to auscultation bilaterally   Skin normal skin turgor and no rashes     Psychiatric orientation to person, place, and time: normal  mood and affect: normal

## 2022-06-30 DIAGNOSIS — Z30.011 ENCOUNTER FOR BCP (BIRTH CONTROL PILLS) INITIAL PRESCRIPTION: ICD-10-CM

## 2022-08-03 ENCOUNTER — HOSPITAL ENCOUNTER (EMERGENCY)
Facility: HOSPITAL | Age: 22
Discharge: HOME | End: 2022-08-03
Attending: EMERGENCY MEDICINE
Payer: COMMERCIAL

## 2022-08-03 ENCOUNTER — APPOINTMENT (EMERGENCY)
Dept: CT IMAGING | Facility: HOSPITAL | Age: 22
End: 2022-08-03
Payer: COMMERCIAL

## 2022-08-03 VITALS
TEMPERATURE: 98 F | DIASTOLIC BLOOD PRESSURE: 70 MMHG | BODY MASS INDEX: 27.07 KG/M2 | HEIGHT: 66 IN | RESPIRATION RATE: 18 BRPM | OXYGEN SATURATION: 98 % | HEART RATE: 67 BPM | WEIGHT: 168.43 LBS | SYSTOLIC BLOOD PRESSURE: 131 MMHG

## 2022-08-03 DIAGNOSIS — N13.30 HYDRONEPHROSIS: ICD-10-CM

## 2022-08-03 DIAGNOSIS — N13.4 HYDROURETER: ICD-10-CM

## 2022-08-03 DIAGNOSIS — N20.0 KIDNEY STONE: Primary | ICD-10-CM

## 2022-08-03 LAB
ALBUMIN SERPL BCP-MCNC: 3.9 G/DL (ref 3.5–5)
ALP SERPL-CCNC: 77 U/L (ref 46–116)
ALT SERPL W P-5'-P-CCNC: 22 U/L (ref 12–78)
ANION GAP SERPL CALCULATED.3IONS-SCNC: 10 MMOL/L (ref 4–13)
AST SERPL W P-5'-P-CCNC: 16 U/L (ref 5–45)
BACTERIA UR QL AUTO: ABNORMAL /HPF
BASOPHILS # BLD AUTO: 0.03 THOUSANDS/ΜL (ref 0–0.1)
BASOPHILS NFR BLD AUTO: 0 % (ref 0–1)
BILIRUB SERPL-MCNC: 0.37 MG/DL (ref 0.2–1)
BILIRUB UR QL STRIP: NEGATIVE
BUN SERPL-MCNC: 15 MG/DL (ref 5–25)
CALCIUM SERPL-MCNC: 9.1 MG/DL (ref 8.3–10.1)
CHLORIDE SERPL-SCNC: 102 MMOL/L (ref 96–108)
CLARITY UR: CLEAR
CO2 SERPL-SCNC: 25 MMOL/L (ref 21–32)
COLOR UR: YELLOW
CREAT SERPL-MCNC: 0.78 MG/DL (ref 0.6–1.3)
EOSINOPHIL # BLD AUTO: 0.02 THOUSAND/ΜL (ref 0–0.61)
EOSINOPHIL NFR BLD AUTO: 0 % (ref 0–6)
ERYTHROCYTE [DISTWIDTH] IN BLOOD BY AUTOMATED COUNT: 12.1 % (ref 11.6–15.1)
EXT PREG TEST URINE: NEGATIVE
EXT. CONTROL ED NAV: NORMAL
GFR SERPL CREATININE-BSD FRML MDRD: 109 ML/MIN/1.73SQ M
GLUCOSE SERPL-MCNC: 112 MG/DL (ref 65–140)
GLUCOSE UR STRIP-MCNC: NEGATIVE MG/DL
HCT VFR BLD AUTO: 39.2 % (ref 34.8–46.1)
HGB BLD-MCNC: 13.4 G/DL (ref 11.5–15.4)
HGB UR QL STRIP.AUTO: ABNORMAL
IMM GRANULOCYTES # BLD AUTO: 0.05 THOUSAND/UL (ref 0–0.2)
IMM GRANULOCYTES NFR BLD AUTO: 0 % (ref 0–2)
KETONES UR STRIP-MCNC: NEGATIVE MG/DL
LEUKOCYTE ESTERASE UR QL STRIP: NEGATIVE
LIPASE SERPL-CCNC: 89 U/L (ref 73–393)
LYMPHOCYTES # BLD AUTO: 1.37 THOUSANDS/ΜL (ref 0.6–4.47)
LYMPHOCYTES NFR BLD AUTO: 10 % (ref 14–44)
MCH RBC QN AUTO: 29.8 PG (ref 26.8–34.3)
MCHC RBC AUTO-ENTMCNC: 34.2 G/DL (ref 31.4–37.4)
MCV RBC AUTO: 87 FL (ref 82–98)
MONOCYTES # BLD AUTO: 0.82 THOUSAND/ΜL (ref 0.17–1.22)
MONOCYTES NFR BLD AUTO: 6 % (ref 4–12)
NEUTROPHILS # BLD AUTO: 11.73 THOUSANDS/ΜL (ref 1.85–7.62)
NEUTS SEG NFR BLD AUTO: 84 % (ref 43–75)
NITRITE UR QL STRIP: NEGATIVE
NON-SQ EPI CELLS URNS QL MICRO: ABNORMAL /HPF
NRBC BLD AUTO-RTO: 0 /100 WBCS
PH UR STRIP.AUTO: 7 [PH]
PLATELET # BLD AUTO: 214 THOUSANDS/UL (ref 149–390)
PMV BLD AUTO: 8.5 FL (ref 8.9–12.7)
POTASSIUM SERPL-SCNC: 3.8 MMOL/L (ref 3.5–5.3)
PROT SERPL-MCNC: 7.9 G/DL (ref 6.4–8.4)
PROT UR STRIP-MCNC: NEGATIVE MG/DL
RBC # BLD AUTO: 4.5 MILLION/UL (ref 3.81–5.12)
RBC #/AREA URNS AUTO: ABNORMAL /HPF
SODIUM SERPL-SCNC: 137 MMOL/L (ref 135–147)
SP GR UR STRIP.AUTO: 1.02 (ref 1–1.03)
UROBILINOGEN UR QL STRIP.AUTO: 0.2 E.U./DL
WBC # BLD AUTO: 14.02 THOUSAND/UL (ref 4.31–10.16)
WBC #/AREA URNS AUTO: ABNORMAL /HPF

## 2022-08-03 PROCEDURE — 99284 EMERGENCY DEPT VISIT MOD MDM: CPT

## 2022-08-03 PROCEDURE — 96361 HYDRATE IV INFUSION ADD-ON: CPT

## 2022-08-03 PROCEDURE — G1004 CDSM NDSC: HCPCS

## 2022-08-03 PROCEDURE — 81025 URINE PREGNANCY TEST: CPT

## 2022-08-03 PROCEDURE — 36415 COLL VENOUS BLD VENIPUNCTURE: CPT

## 2022-08-03 PROCEDURE — 83690 ASSAY OF LIPASE: CPT

## 2022-08-03 PROCEDURE — 85025 COMPLETE CBC W/AUTO DIFF WBC: CPT

## 2022-08-03 PROCEDURE — 74176 CT ABD & PELVIS W/O CONTRAST: CPT

## 2022-08-03 PROCEDURE — 96375 TX/PRO/DX INJ NEW DRUG ADDON: CPT

## 2022-08-03 PROCEDURE — 81001 URINALYSIS AUTO W/SCOPE: CPT

## 2022-08-03 PROCEDURE — 96374 THER/PROPH/DIAG INJ IV PUSH: CPT

## 2022-08-03 PROCEDURE — 80053 COMPREHEN METABOLIC PANEL: CPT

## 2022-08-03 RX ORDER — TAMSULOSIN HYDROCHLORIDE 0.4 MG/1
0.4 CAPSULE ORAL
Qty: 7 CAPSULE | Refills: 0 | Status: SHIPPED | OUTPATIENT
Start: 2022-08-03

## 2022-08-03 RX ORDER — ONDANSETRON 2 MG/ML
4 INJECTION INTRAMUSCULAR; INTRAVENOUS ONCE
Status: COMPLETED | OUTPATIENT
Start: 2022-08-03 | End: 2022-08-03

## 2022-08-03 RX ORDER — KETOROLAC TROMETHAMINE 30 MG/ML
15 INJECTION, SOLUTION INTRAMUSCULAR; INTRAVENOUS ONCE
Status: COMPLETED | OUTPATIENT
Start: 2022-08-03 | End: 2022-08-03

## 2022-08-03 RX ORDER — IBUPROFEN 600 MG/1
600 TABLET ORAL EVERY 6 HOURS PRN
Qty: 30 TABLET | Refills: 0 | Status: SHIPPED | OUTPATIENT
Start: 2022-08-03

## 2022-08-03 RX ADMIN — ONDANSETRON 4 MG: 2 INJECTION INTRAMUSCULAR; INTRAVENOUS at 12:01

## 2022-08-03 RX ADMIN — SODIUM CHLORIDE 1000 ML: 0.9 INJECTION, SOLUTION INTRAVENOUS at 12:04

## 2022-08-03 RX ADMIN — KETOROLAC TROMETHAMINE 15 MG: 30 INJECTION, SOLUTION INTRAMUSCULAR; INTRAVENOUS at 12:07

## 2022-08-03 NOTE — DISCHARGE INSTRUCTIONS
Schedule appointment with urology for follow-up of kidney stones    Return to the ED if experiencing uncontrollable pain, fever, chills, overall worsening symptoms    Take Motrin as needed for pain

## 2022-08-03 NOTE — ED PROVIDER NOTES
History  Chief Complaint   Patient presents with    Abdominal Pain     Pt c/o left sided abdominal pain since last night with some nausea  Thought possibly it was her period but states just had it 1 week ago  Denies urinary sx or fevers  22 y/o  female with PMH of asthma, h/o chlamydia who presented with L flank pain x1 day  Radiates to L CVA, exacerbated with inspiration, unimproved with midol taken at home  Adds that she has N/V, pain with urination, soft non-bloody bowel movements  Reports family history of kidney stones from her mother and aunt  LMP 1 week ago  Denies fever, chest pain, SOB, diarrhea, hematuria, vaginal bleeding, vaginal discharge, foul smelling vaginal odor  Denies alcohol, tobacco, drug use  Prior to Admission Medications   Prescriptions Last Dose Informant Patient Reported? Taking?    Vestura 3-0 02 MG per tablet 2022 at Unknown time  No Yes   Sig: TAKE 1 TABLET BY MOUTH EVERY DAY      Facility-Administered Medications: None       Past Medical History:   Diagnosis Date    Asthma     Bipolar disorder ([facility] [address] )     Chlamydia     COVID-19 virus infection 2021    Migraine        Past Surgical History:   Procedure Laterality Date    TYMPANOPLASTY Right     TYMPANOSTOMY TUBE PLACEMENT         Family History   Problem Relation Age of Onset    Multiple sclerosis Mother     Thyroid disease Mother     Fibromyalgia Mother    [facility] Migraines Mother     Depression Mother     Kidney cancer Maternal Grandmother     Heart attack Maternal Grandmother     Lupus Maternal Grandmother     Skin cancer Maternal Grandmother     Heart disease Maternal Grandmother     Diabetes Maternal Grandfather     Diabetes Maternal Aunt     Polycystic ovary syndrome Maternal Aunt     Depression Maternal Aunt     Anxiety disorder Maternal Aunt     Cancer Maternal Uncle     Depression Father     Bipolar disorder Father     No Known Problems Paternal Grandmother     Cancer Paternal Grandfather     Asperger's syndrome Half-Brother     Suicidality Half-Brother     Mental illness Neg Hx     Substance Abuse Neg Hx     Breast cancer Neg Hx     Colon cancer Neg Hx     Ovarian cancer Neg Hx      I have reviewed and agree with the history as documented  E-Cigarette/Vaping    E-Cigarette Use Former User     Quit Date 1/1/21     Comments infrequent user      E-Cigarette/Vaping Substances    Nicotine Yes     THC No     CBD No     Flavoring Yes     Other No     Unknown No      Social History     Tobacco Use    Smoking status: Never Smoker    Smokeless tobacco: Never Used   Vaping Use    Vaping Use: Former    Quit date: 1/1/2021    Substances: Nicotine, Flavoring   Substance Use Topics    Alcohol use: No    Drug use: Never       Review of Systems   Constitutional: Positive for chills  Negative for fever  HENT: Negative  Eyes: Negative  Respiratory: Negative  Cardiovascular: Negative  Gastrointestinal: Positive for nausea and vomiting  Negative for blood in stool and diarrhea  Genitourinary: Positive for dysuria and flank pain  Negative for hematuria, pelvic pain, vaginal bleeding and vaginal discharge  Musculoskeletal: Positive for back pain  Skin: Negative for color change and rash  Neurological: Negative  Physical Exam  Physical Exam  Vitals reviewed  Constitutional:       General: She is not in acute distress  Appearance: She is well-developed  She is not ill-appearing  HENT:      Head: Normocephalic and atraumatic  Eyes:      Extraocular Movements: Extraocular movements intact  Pupils: Pupils are equal, round, and reactive to light  Cardiovascular:      Rate and Rhythm: Normal rate and regular rhythm  Heart sounds: Normal heart sounds  Pulmonary:      Effort: Pulmonary effort is normal       Breath sounds: Normal breath sounds  Abdominal:      General: Abdomen is flat   Bowel sounds are normal       Palpations: Abdomen is soft       Tenderness: There is abdominal tenderness in the left upper quadrant  There is left CVA tenderness  There is no right CVA tenderness or guarding  Hernia: There is no hernia in the umbilical area  Skin:     General: Skin is warm and dry  Capillary Refill: Capillary refill takes less than 2 seconds  Findings: No rash  Neurological:      General: No focal deficit present  Mental Status: She is alert and oriented to person, place, and time           Vital Signs  ED Triage Vitals   Temperature Pulse Respirations Blood Pressure SpO2   08/03/22 1115 08/03/22 1115 08/03/22 1115 08/03/22 1115 08/03/22 1115   98 °F (36 7 °C) 74 16 128/81 100 %      Temp Source Heart Rate Source Patient Position - Orthostatic VS BP Location FiO2 (%)   08/03/22 1115 08/03/22 1349 08/03/22 1349 08/03/22 1349 --   Oral Monitor Lying Right arm       Pain Score       08/03/22 1115       9           Vitals:    08/03/22 1115 08/03/22 1349   BP: 128/81 131/70   Pulse: 74 67   Patient Position - Orthostatic VS:  Lying         Visual Acuity      ED Medications  Medications   ondansetron (ZOFRAN) injection 4 mg (4 mg Intravenous Given 8/3/22 1201)   sodium chloride 0 9 % bolus 1,000 mL (0 mL Intravenous Stopped 8/3/22 1352)   ketorolac (TORADOL) injection 15 mg (15 mg Intravenous Given 8/3/22 1207)       Diagnostic Studies  Results Reviewed     Procedure Component Value Units Date/Time    Lipase [[id number]]  (Normal) Collected: 08/03/22 1206    Lab Status: Final result Specimen: Blood Updated: 08/03/22 1239     Lipase 89 u/L     Comprehensive metabolic panel [[id number]] Collected: 08/03/22 1206    Lab Status: Final result Specimen: Blood Updated: 08/03/22 1239     Sodium 137 mmol/L      Potassium 3 8 mmol/L      Chloride 102 mmol/L      CO2 25 mmol/L      ANION GAP 10 mmol/L      BUN 15 mg/dL      Creatinine 0 78 mg/dL      Glucose 112 mg/dL      Calcium 9 1 mg/dL      AST 16 U/L      ALT 22 U/L      Alkaline Phosphatase 77 U/L      Total Protein 7 9 g/dL      Albumin 3 9 g/dL      Total Bilirubin 0 37 mg/dL      eGFR 109 ml/min/1 73sq m     Narrative:      [place] guidelines for Chronic Kidney Disease (CKD):     Stage 1 with normal or high GFR (GFR > 90 mL/min/1 73 square meters)    Stage 2 Mild CKD (GFR = 60-89 mL/min/1 73 square meters)    Stage 3A Moderate CKD (GFR = 45-59 mL/min/1 73 square meters)    Stage 3B Moderate CKD (GFR = 30-44 mL/min/1 73 square meters)    Stage 4 Severe CKD (GFR = 15-29 mL/min/1 73 square meters)    Stage 5 End Stage CKD (GFR <15 mL/min/1 73 square meters)  Note: GFR calculation is accurate only with a steady state creatinine    Urine Microscopic [[id number]]  (Abnormal) Collected: 08/03/22 1153    Lab Status: Final result Specimen: Urine, Clean Catch Updated: 08/03/22 1236     RBC, UA 2-4 /hpf      WBC, UA 0-1 /hpf      Epithelial Cells Occasional /hpf      Bacteria, UA Occasional /hpf     UA w Reflex to Microscopic w Reflex to Culture [[id number]]  (Abnormal) Collected: 08/03/22 1153    Lab Status: Final result Specimen: Urine, Clean Catch Updated: 08/03/22 1210     Color, UA Yellow     Clarity, UA Clear     Specific [place], UA 1 020     pH, UA 7 0     Leukocytes, UA Negative     Nitrite, UA Negative     Protein, UA Negative mg/dl      Glucose, UA Negative mg/dl      Ketones, UA Negative mg/dl      Urobilinogen, UA 0 2 E U /dl      Bilirubin, UA Negative     Occult Blood, UA Trace-Intact    CBC and differential [[id number]]  (Abnormal) Collected: 08/03/22 1206    Lab Status: Final result Specimen: Blood Updated: 08/03/22 1208     WBC 14 02 Thousand/uL      RBC 4 50 Million/uL      Hemoglobin 13 4 g/dL      Hematocrit 39 2 %      MCV 87 fL      MCH 29 8 pg      MCHC 34 2 g/dL      RDW 12 1 %      MPV 8 5 fL      Platelets 313 Thousands/uL      nRBC 0 /100 WBCs      Neutrophils Relative 84 %      Immat GRANS % 0 %      Lymphocytes Relative 10 %      Monocytes Relative 6 %      Eosinophils Relative 0 %      Basophils Relative 0 %      Neutrophils Absolute 11 73 Thousands/µL      Immature Grans Absolute 0 05 Thousand/uL      Lymphocytes Absolute 1 37 Thousands/µL      Monocytes Absolute 0 82 Thousand/µL      Eosinophils Absolute 0 02 Thousand/µL      Basophils Absolute 0 03 Thousands/µL     POCT pregnancy, urine [[id number]]  (Normal) Resulted: 08/03/22 1155    Lab Status: Final result Updated: 08/03/22 1155     EXT PREG TEST UR (Ref: Negative) NEGATIVE     Control VALID                 CT renal stone study abdomen pelvis without contrast   Final Result by Rai Olson MD (08/03 1241)      3 mm calculus at the left ureterovesical junction with mild left hydronephrosis  Additional left-sided nephrolithiasis  Workstation performed: [id number]                    Procedures  Procedures         ED Course  ED Course as of 08/03/22 1713   Wed Aug 03, 2022   1215 WBC(!): 14 02   1245 CT renal stone study abdomen pelvis without contrast  There is a 3 mm calculus at the left ureterovesical junction  There is left hydroureter  There is a mild left hydronephrosis  Mild left perinephric edema  Additional punctate 1 to 2 mm left renal calculi  MDM  Number of Diagnoses or Management Options  Hydronephrosis  Hydroureter  Kidney stone  Diagnosis management comments: 24 y/o female who presented with L flank pain x1 day  Radiates to L CVA, increased urinary frequency, chills, discomfort with urination  FH of kidney stones in mom and aunt  Physical exam revealed L CVA tenderness, TTP of LUQ  Benign cardiopulmonary exam      CT renal stone study revealed: There is a 3 mm calculus at the left ureterovesical junction  There is left hydroureter  There is a mild left hydronephrosis  Mild left perinephric edema  Additional punctate 1 to 2 mm left renal calculi  UA negative for infection    CBC with WBC of 14k    Zofran for nausea, Toradol for pain with improvement    Discharged with motrin, flomax, ambulatory urology f/u  Amount and/or Complexity of Data Reviewed  Clinical lab tests: reviewed and ordered  Tests in the radiology section of CPT®: ordered and reviewed  Tests in the medicine section of CPT®: ordered and reviewed  Discuss the patient with other providers: yes        Disposition  Final diagnoses:   Kidney stone   Hydronephrosis   Hydroureter     Time reflects when diagnosis was documented in both MDM as applicable and the Disposition within this note     Time User Action Codes Description Comment    8/3/2022  1:30 PM Lindbergh Basque Add [N20 0] Kidney stone     8/3/2022  1:31 PM Lindbergh Basque Add [N13 30] Hydronephrosis     8/3/2022  1:31 PM Lindbergh Basque Add [N13 4] Hydroureter       ED Disposition     ED Disposition   Discharge    Condition   Stable    Date/Time   Wed Aug 3, 2022  1:30 PM    Katelyn discharge to home/self care                 Follow-up Information     Follow up With Specialties Details Why [address] [place] [address] Urology [facility] Urology Schedule an appointment as soon as possible for a visit   [address]  [address]  702  [facility], [address] [place], [place], [address]   [facility] Emergency Department Emergency Medicine Go to  If symptoms worsen [facility] [id number]  [address] [facility] Emergency Department, [address] [place], [place], [address]          Discharge Medication List as of 8/3/2022  1:39 PM      START taking these medications    Details   ibuprofen (MOTRIN) 600 mg tablet Take 1 tablet (600 mg total) by mouth every 6 (six) hours as needed for mild pain, Starting Wed 8/3/2022, Normal      tamsulosin (FLOMAX) 0 4 mg Take 1 capsule (0 4 mg total) by mouth daily with dinner, Starting Wed 8/3/2022, Normal         CONTINUE these medications which have NOT CHANGED    Details   Vestura 3-0 02 MG per tablet TAKE 1 TABLET BY MOUTH EVERY DAY, Normal                 PDMP Review     None          ED Provider  Electronically Signed by           Chris Will PA-C  08/03/22 4574

## 2022-08-03 NOTE — ED ATTENDING ATTESTATION
8/3/2022  ITim DO, saw and evaluated the patient  I have discussed the patient with the resident/non-physician practitioner and agree with the resident's/non-physician practitioner's findings, Plan of Care, and MDM as documented in the resident's/non-physician practitioner's note, except where noted  All available labs and Radiology studies were reviewed  I was present for key portions of any procedure(s) performed by the resident/non-physician practitioner and I was immediately available to provide assistance  At this point I agree with the current assessment done in the Emergency Department  I have conducted an independent evaluation of this patient a history and physical is as follows:    ED Course         Critical Care Time  Procedures  65-year-old female presents to the ED complaining of left lower quadrant pain radiating to the left flank starting last night  Initially would come and go but since about 2:00 a m  It has been constant  She has also had nausea, vomiting and diarrhea  No fevers  No urinary complaints  Last menstrual period 2 days ago  On exam she is alert no acute distress  She does have mild left lower quadrant tenderness on exam without peritoneal signs  Differential includes but is not limited to UTI, kidney stone, gastroenteritis  Will rule out pregnancy obtain basic labs and CT stone study    Will control nausea and pain

## 2022-08-04 ENCOUNTER — TELEPHONE (OUTPATIENT)
Dept: UROLOGY | Facility: MEDICAL CENTER | Age: 22
End: 2022-08-04

## 2022-08-04 NOTE — TELEPHONE ENCOUNTER
Patient called back and updated her phone number  She is at her mom's and she is having issues with her phone     Please call her at [phone]

## 2022-08-04 NOTE — TELEPHONE ENCOUNTER
Please Triage -   New Patient- Brandeis Sessions     What is the reason for the patients appointment? Patient was in the ER yesterday with left quad pain  Patient had a ct scan and it showed  3 mm calculus at the left ureterovesical junction with mild left hydronephrosis  Additional left-sided nephrolithiasis  Patient is having a lot of pain and she wants to know how to proceed  Imaging/Lab Results:in epic ct scan       Do we accept the patient's insurance or is the patient Self-Pay? Provider & Plan: [facility]   Member ID#: Has the patient had any previous urologist(s)? No        Have patient records been requested? In epic        Has the patient had any outside testing done?   In [facility]     Does the patient have a personal history of cancer?no       Patient can be reached at :[phone]

## 2022-08-04 NOTE — TELEPHONE ENCOUNTER
Attempted to contact patient at number provided  She did not answer  LMOM asking her to contact the office to schedule an appointment  Pt lives in [place]  This is close to [facility]  Dr Morgan Moe does have opening tomorrow at [facility]  Pt may be willing to travel there? If she calls back, please ask her     Thank you!!

## 2022-08-04 NOTE — TELEPHONE ENCOUNTER
Patient called back and she scheduled appointment with Dr Michelle Norris in [place]  for tomorrow at 230 pm   She stated thank you so much

## 2022-08-05 ENCOUNTER — OFFICE VISIT (OUTPATIENT)
Dept: UROLOGY | Facility: CLINIC | Age: 22
End: 2022-08-05
Payer: COMMERCIAL

## 2022-08-05 VITALS
WEIGHT: 171 LBS | DIASTOLIC BLOOD PRESSURE: 80 MMHG | SYSTOLIC BLOOD PRESSURE: 130 MMHG | BODY MASS INDEX: 28.02 KG/M2 | HEART RATE: 76 BPM

## 2022-08-05 DIAGNOSIS — N20.0 KIDNEY STONE: Primary | ICD-10-CM

## 2022-08-05 DIAGNOSIS — N20.1 URETERAL STONE: ICD-10-CM

## 2022-08-05 LAB
SL AMB  POCT GLUCOSE, UA: NORMAL
SL AMB LEUKOCYTE ESTERASE,UA: NORMAL
SL AMB POCT BILIRUBIN,UA: NORMAL
SL AMB POCT BLOOD,UA: NORMAL
SL AMB POCT CLARITY,UA: CLEAR
SL AMB POCT COLOR,UA: YELLOW
SL AMB POCT KETONES,UA: NORMAL
SL AMB POCT NITRITE,UA: NORMAL
SL AMB POCT PH,UA: 5
SL AMB POCT SPECIFIC GRAVITY,UA: 1.01
SL AMB POCT URINE PROTEIN: NORMAL
SL AMB POCT UROBILINOGEN: 0.2

## 2022-08-05 PROCEDURE — 81002 URINALYSIS NONAUTO W/O SCOPE: CPT | Performed by: UROLOGY

## 2022-08-05 PROCEDURE — 99203 OFFICE O/P NEW LOW 30 MIN: CPT | Performed by: UROLOGY

## 2022-08-05 NOTE — PROGRESS NOTES
[address] [facility]  [facility]  [address] [facility] [place]  [place], [address] [facility]  [phone]  www  [facility]  org      NAME: Devi Alarcon  AGE: 23 y o  SEX: female  : 2000   MRN: [id number]    DATE: 2022  TIME: 2:53 PM    Assessment and Plan:    3 mm left UVJ stone: This is actually a little more proximal to the left ureter but she is having the signs and symptoms of stone passage into the bladder  I gave her a strainer and counseling regarding restriction of salt, Pepsi and drinking more water with lemon juice and Sprite  Follow-up 6 months with a kidney bladder ultrasound  If she has passes the stone she will bring it in for analysis  Chief Complaint     Chief Complaint   Patient presents with    Nephrolithiasis       History of Present Illness   New patient office visit:  51-year-old woman seen with left flank pain in the emergency department 8/3/2022 and had a CT scan showing a 3 millimeter stone at the left UVJ  There is left hydroureter  There are additional 1-2 millimeter left renal calculi  The right kidney was totally normal   Otherwise normal CT  She has never had stones before  She drinks enormous amount of Pepsi which she has stopped  We discussed Sprite zero  She delivered a baby 8 months ago  We reviewed her films personally  The following portions of the patient's history were reviewed and updated as appropriate: allergies, current medications, past family history, past medical history, past social history, past surgical history and problem list   Past Medical History:   Diagnosis Date    Asthma     Bipolar disorder ([address] )     Chlamydia     COVID-19 virus infection 2021    Migraine      Past Surgical History:   Procedure Laterality Date    TYMPANOPLASTY Right     TYMPANOSTOMY TUBE PLACEMENT       shoulder  Review of Systems   Review of Systems   Constitutional: Negative for fever  Gastrointestinal: Positive for nausea and vomiting  Genitourinary: Positive for flank pain  Active Problem List     Patient Active Problem List   Diagnosis    Allergic rhinitis    Asthma    Dysmenorrhea    Generalized anxiety disorder    Mood disorder (HCC)    Chronic tension-type headache, not intractable       Objective   /80   Pulse 76   Wt 77 6 kg (171 lb)   LMP 07/27/2022 (Approximate)   BMI 28 02 kg/m²     Physical Exam  Vitals reviewed  Constitutional:       Appearance: Normal appearance  She is normal weight  HENT:      Head: Normocephalic and atraumatic  Eyes:      Extraocular Movements: Extraocular movements intact  Pulmonary:      Effort: Pulmonary effort is normal    Musculoskeletal:         General: Normal range of motion  Cervical back: Normal range of motion  Skin:     Coloration: Skin is not jaundiced or pale  Neurological:      General: No focal deficit present  Mental Status: She is alert and oriented to person, place, and time  Psychiatric:         Mood and Affect: Mood normal          Behavior: Behavior normal          Thought Content:  Thought content normal          Judgment: Judgment normal              Current Medications     Current Outpatient Medications:     ibuprofen (MOTRIN) 600 mg tablet, Take 1 tablet (600 mg total) by mouth every 6 (six) hours as needed for mild pain, Disp: 30 tablet, Rfl: 0    tamsulosin (FLOMAX) 0 4 mg, Take 1 capsule (0 4 mg total) by mouth daily with dinner, Disp: 7 capsule, Rfl: 0    Vestura 3-0 02 MG per tablet, TAKE 1 TABLET BY MOUTH EVERY DAY, Disp: 84 tablet, Rfl: 2        Severa Chol, MD

## 2022-08-05 NOTE — PATIENT INSTRUCTIONS
Avoid salt, should be taking in less than 2000 mg per day  Avoid Pepsi, although you can have 1 every now and then  Sprite zero is actually good at preventing stones  Drink plenty water, enough keep your urine light yellow to clear at all times  You can try lemon juice small amount in each large glass to drink

## 2022-10-04 DIAGNOSIS — Z30.011 ENCOUNTER FOR BCP (BIRTH CONTROL PILLS) INITIAL PRESCRIPTION: ICD-10-CM

## 2022-10-04 RX ORDER — DROSPIRENONE AND ETHINYL ESTRADIOL 0.02-3(28)
1 KIT ORAL DAILY
Qty: 84 TABLET | Refills: 0 | Status: SHIPPED | OUTPATIENT
Start: 2022-10-04

## 2022-12-31 DIAGNOSIS — Z30.011 ENCOUNTER FOR BCP (BIRTH CONTROL PILLS) INITIAL PRESCRIPTION: ICD-10-CM

## 2023-05-19 ENCOUNTER — OFFICE VISIT (OUTPATIENT)
Age: 23
End: 2023-05-19

## 2023-05-19 VITALS
HEART RATE: 72 BPM | SYSTOLIC BLOOD PRESSURE: 128 MMHG | DIASTOLIC BLOOD PRESSURE: 90 MMHG | HEIGHT: 66 IN | RESPIRATION RATE: 18 BRPM | BODY MASS INDEX: 27.67 KG/M2 | WEIGHT: 172.18 LBS | OXYGEN SATURATION: 100 % | TEMPERATURE: 97.6 F

## 2023-05-19 DIAGNOSIS — J06.9 VIRAL URI: Primary | ICD-10-CM

## 2023-05-19 RX ORDER — PREDNISONE 10 MG/1
TABLET ORAL
Qty: 27 TABLET | Refills: 0 | Status: SHIPPED | OUTPATIENT
Start: 2023-05-19

## 2023-05-19 NOTE — PATIENT INSTRUCTIONS
You have been diagnosed with a Viral Upper Respiratory infection and your symptoms should resolve over the next 7 to 10 days with the treatments recommended today  If they do not, it is possible that you have developed a bacterial infection and you should return  If you were to take an antibiotic while you are still in the viral stage, you will not get better any faster, but could kill off the good germs in your body as well as make the germs in you resistant to the antibiotic  Take an expectorant - guaifenesin should be the only ingredient - during the day, and the cough suppressant (ex  Robitussin DM or Tessalon) if needed at night only  Take Zinc 50 mg every 12 hours for the next week (the dose is important so do not just take a multivitamin with zinc or an over-the-counter cold med with zinc such as Airborne or Zicam, as that will not give you the sufficient dose)  You should also take Vitamin D3 5000 i u s per day for the next 1 week, and Vitamin-C 1 g twice daily (and again dosages are important, do not think you are getting enough vitamin C just by drinking extra orange juice)  You may use Flonase as discussed  You may also take a decongestant like Sudafed, unless you have hypertension or cardiac disease  If you are diabetic you should adhere strictly to your diabetic diet and monitor blood sugar closely while on prednisone and you should discontinue the prednisone if blood sugar becomes significantly elevated  Avoid nonsteroidal anti-inflammatories like Advil or Aleve while on prednisone  Upper Respiratory Infection   AMBULATORY CARE:   An upper respiratory infection  is also called a cold  Your nose, throat, ears, and sinuses may be affected  You are more likely to get a cold in the winter  Your risk of getting a cold may be increased if you smoke cigarettes or have allergies, such as hay fever  What causes a cold? A cold is caused by a virus   Many viruses can cause a cold, and each is contagious  This means the virus can be easily spread to another person when the sick person coughs or sneezes  The virus can also be spread if you touch an object the virus is on and then touch your eyes, mouth, or nose  Cold symptoms  are usually worst for the first 3 to 5 days  You may have any of the following:  Runny or stuffy nose    Sneezing and coughing    Sore throat or hoarseness    Red, watery, and sore eyes    Fatigue (you feel more tired than usual)    Chills and fever    Headache, body aches, or sore muscles    Call your local emergency number (911 in the [address] [facility]) if:   You have chest pain or trouble breathing  Seek care immediately if:   You have a fever over 102ºF (39ºC)  Call your doctor if:   You have a low fever  Your sore throat gets worse or you see white or yellow spots in your throat  Your symptoms get worse after 3 to 5 days or are not better in 14 days  You have a rash anywhere on your skin  You have large, tender lumps in your neck  You have thick, green, or yellow drainage from your nose  You cough up thick yellow, green, or bloody mucus  You have a bad earache  You have questions or concerns about your condition or care  Treatment:  Colds are caused by viruses and do not get better with antibiotics  Most people get better in 7 to 14 days  You may continue to cough for 2 to 3 weeks  The following may help decrease your symptoms:  Decongestants  help reduce nasal congestion and help you breathe more easily  If you take decongestant pills, they may make you feel restless or not able to sleep  Do not use decongestant sprays for more than a few days  Cough suppressants  help reduce coughing  Ask your healthcare provider which type of cough medicine is best for you  NSAIDs , such as ibuprofen, help decrease swelling, pain, and fever  NSAIDs can cause stomach bleeding or kidney problems in certain people   If you take blood thinner medicine, always ask your healthcare provider if NSAIDs are safe for you  Always read the medicine label and follow directions  Acetaminophen  decreases pain and fever  It is available without a doctor's order  Ask how much to take and how often to take it  Follow directions  Read the labels of all other medicines you are using to see if they also contain acetaminophen, or ask your doctor or pharmacist  Acetaminophen can cause liver damage if not taken correctly  Do not use more than 4 grams (4,000 milligrams) total of acetaminophen in one day  Manage a cold:   Rest as much as possible  Slowly start to do more each day  Drink more liquids as directed  Liquids will help thin and loosen mucus so you can cough it up  Liquids will also help prevent dehydration  Liquids that help prevent dehydration include water, fruit juice, and broth  Do not drink liquids that contain caffeine  Caffeine can increase your risk for dehydration  Ask your healthcare provider how much liquid to drink each day  Soothe a sore throat  Gargle with warm salt water  Make salt water by dissolving ¼ teaspoon salt in 1 cup warm water  You may also suck on hard candy or throat lozenges  You may use a sore throat spray  Use a humidifier or vaporizer  Use a cool mist humidifier or a vaporizer to increase air moisture in your home  This may make it easier for you to breathe and help decrease your cough  Use saline nasal drops as directed  These help relieve congestion  Apply petroleum-based jelly around the outside of your nostrils  This can decrease irritation from blowing your nose  Do not smoke  Nicotine and other chemicals in cigarettes and cigars can make your symptoms worse  They can also cause infections such as bronchitis or pneumonia  Ask your healthcare provider for information if you currently smoke and need help to quit  E-cigarettes or smokeless tobacco still contain nicotine   Talk to your healthcare provider before you use these products  Prevent a cold: Wash your hands often  Use soap and water every time you wash your hands  Rub your soapy hands together, lacing your fingers  Use the fingers of one hand to scrub under the nails of the other hand  Wash for at least 20 seconds  Rinse with warm, running water for several seconds  Then dry your hands  Use germ-killing gel if soap and water are not available  Do not touch your eyes or mouth without washing your hands first      Cover a sneeze or cough  Use a tissue that covers your mouth and nose  Put the used tissue in the trash right away  Use the bend of your arm if a tissue is not available  Wash your hands well with soap and water or use a hand   Do not stand close to anyone who is sneezing or coughing  Try to stay away from others while you are sick  This is especially important during the first 2 to 3 days when the virus is more easily spread  Wait until a fever, cough, or other symptoms are gone before you return to work or other regular activities  Do not share items while you are sick  This includes food, drinks, eating utensils, and dishes  Follow up with your doctor as directed:  Write down your questions so you remember to ask them during your visits  © Copyright [employer] 2022 Information is for End User's use only and may not be sold, redistributed or otherwise used for commercial purposes  All illustrations and images included in CareNotes® are the copyrighted property of A D [employer] M [employer]  or Waldemar Kraus  The above information is an  only  It is not intended as medical advice for individual conditions or treatments  Talk to your doctor, nurse or pharmacist before following any medical regimen to see if it is safe and effective for you

## 2023-05-19 NOTE — PROGRESS NOTES
[facility]        NAME: Mickie Spencer is a 26 y o  female  : 2000    MRN: [id number]  DATE: May 19, 2023  TIME: 12:52 PM    Assessment and Plan   Viral URI [J06 9]  1  Viral URI              Patient Instructions     Patient Instructions     You have been diagnosed with a Viral Upper Respiratory infection and your symptoms should resolve over the next 7 to 10 days with the treatments recommended today  If they do not, it is possible that you have developed a bacterial infection and you should return  If you were to take an antibiotic while you are still in the viral stage, you will not get better any faster, but could kill off the good germs in your body as well as make the germs in you resistant to the antibiotic  Take an expectorant - guaifenesin should be the only ingredient - during the day, and the cough suppressant (ex  Robitussin DM or Tessalon) if needed at night only  Take Zinc 50 mg every 12 hours for the next week (the dose is important so do not just take a multivitamin with zinc or an over-the-counter cold med with zinc such as Airborne or Zicam, as that will not give you the sufficient dose)  You should also take Vitamin D3 5000 i u s per day for the next 1 week, and Vitamin-C 1 g twice daily (and again dosages are important, do not think you are getting enough vitamin C just by drinking extra orange juice)  You may use Flonase as discussed  You may also take a decongestant like Sudafed, unless you have hypertension or cardiac disease  If you are diabetic you should adhere strictly to your diabetic diet and monitor blood sugar closely while on prednisone and you should discontinue the prednisone if blood sugar becomes significantly elevated  Avoid nonsteroidal anti-inflammatories like Advil or Aleve while on prednisone  Upper Respiratory Infection   AMBULATORY CARE:   An upper respiratory infection  is also called a cold   Your nose, throat, ears, and sinuses may be affected  You are more likely to get a cold in the winter  Your risk of getting a cold may be increased if you smoke cigarettes or have allergies, such as hay fever  What causes a cold? A cold is caused by a virus  Many viruses can cause a cold, and each is contagious  This means the virus can be easily spread to another person when the sick person coughs or sneezes  The virus can also be spread if you touch an object the virus is on and then touch your eyes, mouth, or nose  Cold symptoms  are usually worst for the first 3 to 5 days  You may have any of the following:  · Runny or stuffy nose    · Sneezing and coughing    · Sore throat or hoarseness    · Red, watery, and sore eyes    · Fatigue (you feel more tired than usual)    · Chills and fever    · Headache, body aches, or sore muscles    Call your local emergency number (911 in the [address] [place] [address],[address]) if:   · You have chest pain or trouble breathing  Seek care immediately if:   · You have a fever over 102ºF (39ºC)  Call your doctor if:   · You have a low fever  · Your sore throat gets worse or you see white or yellow spots in your throat  · Your symptoms get worse after 3 to 5 days or are not better in 14 days  · You have a rash anywhere on your skin  · You have large, tender lumps in your neck  · You have thick, green, or yellow drainage from your nose  · You cough up thick yellow, green, or bloody mucus  · You have a bad earache  · You have questions or concerns about your condition or care  Treatment:  Colds are caused by viruses and do not get better with antibiotics  Most people get better in 7 to 14 days  You may continue to cough for 2 to 3 weeks  The following may help decrease your symptoms:  · Decongestants  help reduce nasal congestion and help you breathe more easily  If you take decongestant pills, they may make you feel restless or not able to sleep  Do not use decongestant sprays for more than a few days      · Cough suppressants  help reduce coughing  Ask your healthcare provider which type of cough medicine is best for you  · NSAIDs , such as ibuprofen, help decrease swelling, pain, and fever  NSAIDs can cause stomach bleeding or kidney problems in certain people  If you take blood thinner medicine, always ask your healthcare provider if NSAIDs are safe for you  Always read the medicine label and follow directions  · Acetaminophen  decreases pain and fever  It is available without a doctor's order  Ask how much to take and how often to take it  Follow directions  Read the labels of all other medicines you are using to see if they also contain acetaminophen, or ask your doctor or pharmacist  Acetaminophen can cause liver damage if not taken correctly  Do not use more than 4 grams (4,000 milligrams) total of acetaminophen in one day  Manage a cold:   · Rest as much as possible  Slowly start to do more each day  · Drink more liquids as directed  Liquids will help thin and loosen mucus so you can cough it up  Liquids will also help prevent dehydration  Liquids that help prevent dehydration include water, fruit juice, and broth  Do not drink liquids that contain caffeine  Caffeine can increase your risk for dehydration  Ask your healthcare provider how much liquid to drink each day  · Soothe a sore throat  Gargle with warm salt water  Make salt water by dissolving ¼ teaspoon salt in 1 cup warm water  You may also suck on hard candy or throat lozenges  You may use a sore throat spray  · Use a humidifier or vaporizer  Use a cool mist humidifier or a vaporizer to increase air moisture in your home  This may make it easier for you to breathe and help decrease your cough  · Use saline nasal drops as directed  These help relieve congestion  · Apply petroleum-based jelly around the outside of your nostrils  This can decrease irritation from blowing your nose  · Do not smoke    Nicotine and other chemicals in cigarettes and cigars can make your symptoms worse  They can also cause infections such as bronchitis or pneumonia  Ask your healthcare provider for information if you currently smoke and need help to quit  E-cigarettes or smokeless tobacco still contain nicotine  Talk to your healthcare provider before you use these products  Prevent a cold:   · Wash your hands often  Use soap and water every time you wash your hands  Rub your soapy hands together, lacing your fingers  Use the fingers of one hand to scrub under the nails of the other hand  Wash for at least 20 seconds  Rinse with warm, running water for several seconds  Then dry your hands  Use germ-killing gel if soap and water are not available  Do not touch your eyes or mouth without washing your hands first      · Cover a sneeze or cough  Use a tissue that covers your mouth and nose  Put the used tissue in the trash right away  Use the bend of your arm if a tissue is not available  Wash your hands well with soap and water or use a hand   Do not stand close to anyone who is sneezing or coughing  · Try to stay away from others while you are sick  This is especially important during the first 2 to 3 days when the virus is more easily spread  Wait until a fever, cough, or other symptoms are gone before you return to work or other regular activities  · Do not share items while you are sick  This includes food, drinks, eating utensils, and dishes  Follow up with your doctor as directed:  Write down your questions so you remember to ask them during your visits  © Copyright [employer] 2022 Information is for End User's use only and may not be sold, redistributed or otherwise used for commercial purposes  All illustrations and images included in CareNotes® are the copyrighted property of [employer]  or Waldemar Yanes   The above information is an  only   It is not intended as medical advice for individual conditions "or treatments  Talk to your doctor, nurse or pharmacist before following any medical regimen to see if it is safe and effective for you  Follow up with PCP in 3-5 days  Proceed to  ER if symptoms worsen  Chief Complaint     Chief Complaint   Patient presents with   • Cold Like Symptoms     X 1 month  Started with \"tonsilitis  \" Sore throat, lose voice occasionally, headaches, post nasal drip  History of Present Illness       Patient complains of sore throat, cough and congestion on and off for the past month  He now notes hoarseness and sinus pressure  Review of Systems   Review of Systems   Constitutional: Negative for appetite change, chills, fatigue and fever  HENT: Positive for congestion, rhinorrhea, sinus pressure, sore throat and voice change  Negative for trouble swallowing  Respiratory: Positive for cough  Negative for chest tightness, shortness of breath and wheezing  Cardiovascular: Negative for chest pain  Gastrointestinal: Negative for nausea  Musculoskeletal: Negative for myalgias           Current Medications       Current Outpatient Medications:   •  ibuprofen (MOTRIN) 600 mg tablet, Take 1 tablet (600 mg total) by mouth every 6 (six) hours as needed for mild pain (Patient not taking: Reported on 5/19/2023), Disp: 30 tablet, Rfl: 0  •  tamsulosin (FLOMAX) 0 4 mg, Take 1 capsule (0 4 mg total) by mouth daily with dinner (Patient not taking: Reported on 5/19/2023), Disp: 7 capsule, Rfl: 0  •  Vestura 3-0 02 MG per tablet, TAKE 1 TABLET BY MOUTH EVERY DAY (Patient not taking: Reported on 5/19/2023), Disp: 84 tablet, Rfl: 0    Current Allergies     Allergies as of 05/19/2023 - Reviewed 05/19/2023   Allergen Reaction Noted   • Sulfa antibiotics Vomiting 06/15/2013   • Latex Rash 11/29/2021            The following portions of the patient's history were reviewed and updated as appropriate: allergies, current medications, past family history, past medical history, past " "social history, past surgical history and problem list      Past Medical History:   Diagnosis Date   • Asthma    • Bipolar disorder ([facility] [address] )    • Chlamydia    • COVID-19 virus infection 05/19/2021   • Migraine        Past Surgical History:   Procedure Laterality Date   • TYMPANOPLASTY Right    • TYMPANOSTOMY TUBE PLACEMENT         Family History   Problem Relation Age of Onset   • Multiple sclerosis Mother    • Thyroid disease Mother    • Fibromyalgia Mother    • Migraines Mother    • Depression Mother    • Kidney cancer Maternal Grandmother    • Heart attack Maternal Grandmother    • Lupus Maternal Grandmother    • Skin cancer Maternal Grandmother    • Heart disease Maternal Grandmother    • Diabetes Maternal Grandfather    • Diabetes Maternal Aunt    • Polycystic ovary syndrome Maternal Aunt    • Depression Maternal Aunt    • Anxiety disorder Maternal Aunt    • Cancer Maternal Uncle    • Depression Father    • Bipolar disorder Father    • No Known Problems Paternal Grandmother    • Cancer Paternal Grandfather    • Asperger's syndrome Half-Brother    • Suicidality Half-Brother    • Mental illness Neg Hx    • Substance Abuse Neg Hx    • Breast cancer Neg Hx    • Colon cancer Neg Hx    • Ovarian cancer Neg Hx          Medications have been verified  Objective   /90   Pulse 72   Temp 97 6 °F (36 4 °C)   Resp 18   Ht 5' 5 5\" (1 664 m)   Wt 78 1 kg (172 lb 2 9 oz)   LMP 04/14/2023 (Approximate)   SpO2 100%   BMI 28 22 kg/m²        Physical Exam     Physical Exam  Vitals and nursing note reviewed  Constitutional:       General: She is not in acute distress  Appearance: She is well-developed  HENT:      Head: Normocephalic and atraumatic  Nose: Mucosal edema and congestion present  Mouth/Throat:      Pharynx: Posterior oropharyngeal erythema present  No oropharyngeal exudate  Tonsils: No tonsillar abscesses  Cardiovascular:      Rate and Rhythm: Normal rate and regular rhythm   " Pulmonary:      Effort: Pulmonary effort is normal  No respiratory distress  Breath sounds: No wheezing or rales  Musculoskeletal:      Cervical back: Neck supple  Skin:     General: Skin is warm and dry  Neurological:      Mental Status: She is alert and oriented to person, place, and time  Psychiatric:         Mood and Affect: Mood normal          Behavior: Behavior normal          Thought Content:  Thought content normal          Judgment: Judgment normal

## 2023-07-17 ENCOUNTER — PATIENT MESSAGE (OUTPATIENT)
Dept: OBGYN CLINIC | Facility: MEDICAL CENTER | Age: 23
End: 2023-07-17

## 2023-07-18 ENCOUNTER — OFFICE VISIT (OUTPATIENT)
Dept: OBGYN CLINIC | Facility: MEDICAL CENTER | Age: 23
End: 2023-07-18
Payer: COMMERCIAL

## 2023-07-18 VITALS
BODY MASS INDEX: 27.1 KG/M2 | DIASTOLIC BLOOD PRESSURE: 85 MMHG | HEIGHT: 66 IN | WEIGHT: 168.6 LBS | SYSTOLIC BLOOD PRESSURE: 125 MMHG

## 2023-07-18 DIAGNOSIS — Z20.2 STD EXPOSURE: ICD-10-CM

## 2023-07-18 DIAGNOSIS — N89.8 VAGINAL DISCHARGE: Primary | ICD-10-CM

## 2023-07-18 PROCEDURE — 99213 OFFICE O/P EST LOW 20 MIN: CPT | Performed by: OBSTETRICS & GYNECOLOGY

## 2023-07-18 PROCEDURE — 87660 TRICHOMONAS VAGIN DIR PROBE: CPT | Performed by: OBSTETRICS & GYNECOLOGY

## 2023-07-18 PROCEDURE — 87591 N.GONORRHOEAE DNA AMP PROB: CPT | Performed by: OBSTETRICS & GYNECOLOGY

## 2023-07-18 PROCEDURE — 87510 GARDNER VAG DNA DIR PROBE: CPT | Performed by: OBSTETRICS & GYNECOLOGY

## 2023-07-18 PROCEDURE — 87480 CANDIDA DNA DIR PROBE: CPT | Performed by: OBSTETRICS & GYNECOLOGY

## 2023-07-18 PROCEDURE — 87491 CHLMYD TRACH DNA AMP PROBE: CPT | Performed by: OBSTETRICS & GYNECOLOGY

## 2023-07-18 RX ORDER — FLUCONAZOLE 150 MG/1
TABLET ORAL
COMMUNITY
Start: 2023-07-14

## 2023-07-18 NOTE — PROGRESS NOTES
Assessment:   Nimesh Carrizales was seen today for vaginitis. Diagnoses and all orders for this visit:    Vaginal discharge  -     VAGINOSIS DNA PROBE (AFFIRM)  -     POCT wet mount  -     Chlamydia/GC amplified DNA by PCR    STD exposure  -     POCT wet mount        Plan:  1. Wet prep was obtained. No evidence of vaginitis today. 2. Cultures for gonorrhea and chlamydia were collected. 3. Affirm was obtained; pt desires confirmation that her infection has completely cleared. 4.  Will contact pt with results. 5.  Discussed using no fragrance feminine washes and to consider probiotics and yogurt with live active cultures. Subjective:   Ms.Brooklyn Nestor Cedillo is a 26 y.o. yo female who presents for ? Yeast infection. Patient reports she had some itching and a "crumbly" white discharge proximally 2 months ago. No odor. Patient had taken over-the-counter Monistat and it went away. Patient's symptoms then returned and she presented to urgent care July 14. Patient and was given 2 diflucan. Symptoms have mostly resolved however patient wants to make sure that her infection has completely resolved. Patient also desires STD testing. Patient denies any changes in medications or diet or activity. Pt reports she did change her body wash. ROS:   She denies hematuria, dysuria, constipation, diarrhea, fevers, chills, nausea or emesis.     Patient Active Problem List   Diagnosis   • Allergic rhinitis   • Asthma   • Dysmenorrhea   • Generalized anxiety disorder   • Mood disorder ([address])   • Chronic tension-type headache, not intractable       Past Medical History:   Diagnosis Date   • Asthma    • Bipolar disorder ([address])    • Chlamydia    • COVID-19 virus infection 05/19/2021   • Migraine        Social History     Socioeconomic History   • Marital status: Single     Spouse name: Not on file   • Number of children: Not on file   • Years of education: Not on file   • Highest education level: Not on file   Occupational History • Not on file   Tobacco Use   • Smoking status: Never   • Smokeless tobacco: Never   Vaping Use   • Vaping Use: Former   • Quit date: 1/1/2021   • Substances: Nicotine, Flavoring   Substance and Sexual Activity   • Alcohol use: Yes     Comment: occasional   • Drug use: Never   • Sexual activity: Yes     Partners: Male     Birth control/protection: Injection, None   Other Topics Concern   • Not on file   Social History Narrative   • Not on file     Social Determinants of Health     Financial Resource Strain: Not on file   Food Insecurity: Not on file   Transportation Needs: Not on file   Physical Activity: Not on file   Stress: Not on file   Social Connections: Not on file   Intimate Partner Violence: Not on file   Housing Stability: Not on file         Current Outpatient Medications:   •  fluconazole (DIFLUCAN) 150 mg tablet, TAKE 1 TABLET BY MOUTH. REPEAT DOSE IN 3 DAYS. (Patient not taking: Reported on 7/18/2023), Disp: , Rfl:   •  fluconazole (DIFLUCAN) 150 mg tablet, Take 1 po. Repeat dose in 3 days.  (Patient not taking: Reported on 7/18/2023), Disp: , Rfl:   •  ibuprofen (MOTRIN) 600 mg tablet, Take 1 tablet (600 mg total) by mouth every 6 (six) hours as needed for mild pain (Patient not taking: Reported on 5/19/2023), Disp: 30 tablet, Rfl: 0  •  predniSONE 10 mg tablet, Take once daily all days pills on this schedule 6- 6- 5- 4- 3- 2- 1 (Patient not taking: Reported on 7/18/2023), Disp: 27 tablet, Rfl: 0  •  tamsulosin (FLOMAX) 0.4 mg, Take 1 capsule (0.4 mg total) by mouth daily with dinner (Patient not taking: Reported on 5/19/2023), Disp: 7 capsule, Rfl: 0  •  Vestura 3-0.02 MG per tablet, TAKE 1 TABLET BY MOUTH EVERY DAY (Patient not taking: Reported on 5/19/2023), Disp: 84 tablet, Rfl: 0    Allergies   Allergen Reactions   • Sulfa Antibiotics Vomiting   • Latex Rash       /85   Ht 5' 5.5" (1.664 m)   Wt 76.5 kg (168 lb 9.6 oz)   LMP 06/07/2023 (Approximate)   BMI 27.63 kg/m²     GEN: The patient was alert and oriented x3, pleasant well-appearing female in no acute distress. Pelvic: Normal appearing external female genitalia, normal vaginal epithelium, normalappearing cervix. negative discharge noted; only scant clear discharge noted.       Wet Prep: Clue cells negative, Hyphae negative, Trichomonas negative

## 2023-07-18 NOTE — PATIENT INSTRUCTIONS
Thank you for your confidence in our team.   We appreciate you and welcome your feedback. If you receive a survey from us, please take a few moments to let us know how we are doing.    Sincerely,   Melvin Mejia MD

## 2023-07-19 LAB
C TRACH DNA SPEC QL NAA+PROBE: NEGATIVE
CANDIDA RRNA VAG QL PROBE: NEGATIVE
G VAGINALIS RRNA GENITAL QL PROBE: NEGATIVE
N GONORRHOEA DNA SPEC QL NAA+PROBE: NEGATIVE
T VAGINALIS RRNA GENITAL QL PROBE: NEGATIVE

## 2023-09-26 ENCOUNTER — OFFICE VISIT (OUTPATIENT)
Dept: FAMILY MEDICINE CLINIC | Facility: CLINIC | Age: 23
End: 2023-09-26
Payer: COMMERCIAL

## 2023-09-26 VITALS
DIASTOLIC BLOOD PRESSURE: 78 MMHG | HEIGHT: 66 IN | HEART RATE: 107 BPM | BODY MASS INDEX: 27 KG/M2 | OXYGEN SATURATION: 98 % | WEIGHT: 168 LBS | SYSTOLIC BLOOD PRESSURE: 112 MMHG

## 2023-09-26 DIAGNOSIS — G43.809 OTHER MIGRAINE WITHOUT STATUS MIGRAINOSUS, NOT INTRACTABLE: ICD-10-CM

## 2023-09-26 DIAGNOSIS — J02.9 PHARYNGITIS, UNSPECIFIED ETIOLOGY: ICD-10-CM

## 2023-09-26 DIAGNOSIS — R23.3 EASY BRUISABILITY: ICD-10-CM

## 2023-09-26 DIAGNOSIS — R53.83 OTHER FATIGUE: Primary | ICD-10-CM

## 2023-09-26 PROCEDURE — 99214 OFFICE O/P EST MOD 30 MIN: CPT | Performed by: FAMILY MEDICINE

## 2023-09-26 RX ORDER — RIZATRIPTAN BENZOATE 10 MG/1
10 TABLET, ORALLY DISINTEGRATING ORAL ONCE AS NEEDED
Qty: 10 TABLET | Refills: 5 | Status: SHIPPED | OUTPATIENT
Start: 2023-09-26

## 2023-09-26 RX ORDER — AMOXICILLIN 875 MG/1
875 TABLET, COATED ORAL 2 TIMES DAILY
Qty: 20 TABLET | Refills: 0 | Status: SHIPPED | OUTPATIENT
Start: 2023-09-26 | End: 2023-10-06
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
## 2023-09-27 ENCOUNTER — APPOINTMENT (OUTPATIENT)
Age: 23
End: 2023-09-27
Payer: COMMERCIAL

## 2023-09-27 DIAGNOSIS — R53.83 OTHER FATIGUE: ICD-10-CM

## 2023-09-27 DIAGNOSIS — R23.3 EASY BRUISABILITY: ICD-10-CM

## 2023-09-27 LAB
ALBUMIN SERPL BCP-MCNC: 4.7 G/DL (ref 3.5–5)
ALP SERPL-CCNC: 73 U/L (ref 34–104)
ALT SERPL W P-5'-P-CCNC: 9 U/L (ref 7–52)
ANION GAP SERPL CALCULATED.3IONS-SCNC: 11 MMOL/L
APTT PPP: 31 SECONDS (ref 23–37)
AST SERPL W P-5'-P-CCNC: 13 U/L (ref 13–39)
BASOPHILS # BLD AUTO: 0.03 THOUSANDS/ÂΜL (ref 0–0.1)
BASOPHILS NFR BLD AUTO: 0 % (ref 0–1)
BILIRUB SERPL-MCNC: 0.53 MG/DL (ref 0.2–1)
BUN SERPL-MCNC: 14 MG/DL (ref 5–25)
CALCIUM SERPL-MCNC: 9.6 MG/DL (ref 8.4–10.2)
CHLORIDE SERPL-SCNC: 105 MMOL/L (ref 96–108)
CO2 SERPL-SCNC: 24 MMOL/L (ref 21–32)
CREAT SERPL-MCNC: 0.63 MG/DL (ref 0.6–1.3)
EOSINOPHIL # BLD AUTO: 0.36 THOUSAND/ÂΜL (ref 0–0.61)
EOSINOPHIL NFR BLD AUTO: 5 % (ref 0–6)
ERYTHROCYTE [DISTWIDTH] IN BLOOD BY AUTOMATED COUNT: 12.5 % (ref 11.6–15.1)
GFR SERPL CREATININE-BSD FRML MDRD: 128 ML/MIN/1.73SQ M
GLUCOSE P FAST SERPL-MCNC: 92 MG/DL (ref 65–99)
HCT VFR BLD AUTO: 35.8 % (ref 34.8–46.1)
HGB BLD-MCNC: 12.4 G/DL (ref 11.5–15.4)
IMM GRANULOCYTES # BLD AUTO: 0.01 THOUSAND/UL (ref 0–0.2)
IMM GRANULOCYTES NFR BLD AUTO: 0 % (ref 0–2)
INR PPP: 0.93 (ref 0.84–1.19)
LYMPHOCYTES # BLD AUTO: 2.08 THOUSANDS/ÂΜL (ref 0.6–4.47)
LYMPHOCYTES NFR BLD AUTO: 30 % (ref 14–44)
MCH RBC QN AUTO: 30.2 PG (ref 26.8–34.3)
MCHC RBC AUTO-ENTMCNC: 34.6 G/DL (ref 31.4–37.4)
MCV RBC AUTO: 87 FL (ref 82–98)
MONOCYTES # BLD AUTO: 0.6 THOUSAND/ÂΜL (ref 0.17–1.22)
MONOCYTES NFR BLD AUTO: 9 % (ref 4–12)
NEUTROPHILS # BLD AUTO: 3.88 THOUSANDS/ÂΜL (ref 1.85–7.62)
NEUTS SEG NFR BLD AUTO: 56 % (ref 43–75)
NRBC BLD AUTO-RTO: 0 /100 WBCS
PLATELET # BLD AUTO: 258 THOUSANDS/UL (ref 149–390)
PMV BLD AUTO: 9.1 FL (ref 8.9–12.7)
POTASSIUM SERPL-SCNC: 4.2 MMOL/L (ref 3.5–5.3)
PROT SERPL-MCNC: 7.7 G/DL (ref 6.4–8.4)
PROTHROMBIN TIME: 12.7 SECONDS (ref 11.6–14.5)
RBC # BLD AUTO: 4.1 MILLION/UL (ref 3.81–5.12)
SODIUM SERPL-SCNC: 140 MMOL/L (ref 135–147)
T4 FREE SERPL-MCNC: 0.73 NG/DL (ref 0.61–1.12)
TSH SERPL DL<=0.05 MIU/L-ACNC: 2.63 UIU/ML (ref 0.45–4.5)
WBC # BLD AUTO: 6.96 THOUSAND/UL (ref 4.31–10.16)

## 2023-09-27 PROCEDURE — 85730 THROMBOPLASTIN TIME PARTIAL: CPT

## 2023-09-27 PROCEDURE — 36415 COLL VENOUS BLD VENIPUNCTURE: CPT

## 2023-09-27 PROCEDURE — 84439 ASSAY OF FREE THYROXINE: CPT

## 2023-09-27 PROCEDURE — 84479 ASSAY OF THYROID (T3 OR T4): CPT

## 2023-09-27 PROCEDURE — 85610 PROTHROMBIN TIME: CPT

## 2023-09-27 PROCEDURE — 80053 COMPREHEN METABOLIC PANEL: CPT

## 2023-09-27 PROCEDURE — 85025 COMPLETE CBC W/AUTO DIFF WBC: CPT

## 2023-09-27 PROCEDURE — 84443 ASSAY THYROID STIM HORMONE: CPT

## 2023-09-28 LAB — T3RU NFR SERPL: 24 % (ref 24–39)

## 2023-10-10 ENCOUNTER — TELEPHONE (OUTPATIENT)
Dept: OBGYN CLINIC | Facility: MEDICAL CENTER | Age: 23
End: 2023-10-10

## 2023-10-10 DIAGNOSIS — N89.8 VAGINAL DISCHARGE: Primary | ICD-10-CM

## 2023-10-10 DIAGNOSIS — M62.89 PELVIC FLOOR DYSFUNCTION: ICD-10-CM

## 2023-10-10 NOTE — TELEPHONE ENCOUNTER
Patient called. Would like to know if her referral can be put again (currently discontinued). Please review when you have a chance, thank you!

## 2023-10-18 ENCOUNTER — ANNUAL EXAM (OUTPATIENT)
Dept: OBGYN CLINIC | Facility: CLINIC | Age: 23
End: 2023-10-18
Payer: COMMERCIAL

## 2023-10-18 VITALS
HEIGHT: 66 IN | SYSTOLIC BLOOD PRESSURE: 125 MMHG | BODY MASS INDEX: 26.87 KG/M2 | WEIGHT: 167.2 LBS | DIASTOLIC BLOOD PRESSURE: 75 MMHG

## 2023-10-18 DIAGNOSIS — Z01.419 ENCOUNTER FOR WELL WOMAN EXAM WITH ROUTINE GYNECOLOGICAL EXAM: Primary | ICD-10-CM

## 2023-10-18 PROCEDURE — G0145 SCR C/V CYTO,THINLAYER,RESCR: HCPCS | Performed by: OBSTETRICS & GYNECOLOGY

## 2023-10-18 PROCEDURE — 99395 PREV VISIT EST AGE 18-39: CPT | Performed by: OBSTETRICS & GYNECOLOGY

## 2023-10-18 NOTE — PROGRESS NOTES
[facility]  [address], [address], [place], [place]    ASSESSMENT/PLAN: Annamarie Whitney is a 26 y.o.  who presents for annual gynecologic exam.    Encounter for routine gynecologic examination  - Routine well woman exam completed today. - Cervical Cancer Screening: Current ASCCP Guidelines reviewed. Last Pap: Not on file . Next Pap Due: today  - HPV Vaccination status: Immunization series complete  - STI screening offered including HIV testing: Declined, recently done  - Contraceptive counseling discussed. Current contraception: none or condoms     Additional problems addressed during this visit:  1. Encounter for well woman exam with routine gynecological exam        CC:  Annual Gynecologic Examination    HPI: Annamarie Whitney is a 26 y.o.  who presents for annual gynecologic examination. Gynecologic Exam      She reports no new changes to her health. She reports no breast concerns. She gets regular periods. She has no vaginal discharge, vulvar or vaginal lesions, pelvic pain, or abnormal bleeding. She has no sexual health concerns and is currently sexually active with one male partner. She is inconsistent with taking her pill. Interested in restarting depo or an IUD. Considering both. Will call with decision. If she decides on an IUD, she would like Ni  Still reports pelvic floor dysfunction, has PFPT this week    The following portions of the patient's history were reviewed and updated as appropriate: allergies, current medications, past family history, past medical history, obstetric history, gynecologic history, past social history, past surgical history and problem list.    Review of Systems   Constitutional: Negative. HENT: Negative. Eyes: Negative. Respiratory: Negative. Cardiovascular: Negative. Gastrointestinal: Negative. Genitourinary: Negative. Musculoskeletal: Negative. All other systems reviewed and are negative. Objective:  /75   Ht 5' 5.5" (1.664 m)   Wt 75.8 kg (167 lb 3.2 oz)   LMP 09/27/2023 (Approximate)   BMI 27.40 kg/m²    Physical Exam  Vitals reviewed. Constitutional:       General: She is not in acute distress. Appearance: She is well-developed. HENT:      Head: Normocephalic and atraumatic. Nose: Nose normal.   Cardiovascular:      Rate and Rhythm: Normal rate. Pulmonary:      Effort: Pulmonary effort is normal. No respiratory distress. Chest:   Breasts:     Breasts are symmetrical.      Right: Normal. No mass, nipple discharge, skin change or tenderness. Left: Normal. No mass, nipple discharge, skin change or tenderness. Abdominal:      General: There is no distension. Palpations: Abdomen is soft. There is no mass. Tenderness: There is no abdominal tenderness. There is no guarding or rebound. Genitourinary:     General: Normal vulva. Exam position: Lithotomy position. Labia:         Right: No lesion. Left: No lesion. Urethra: No prolapse (urethral meatus normal). Vagina: Normal. No vaginal discharge, erythema or bleeding. Cervix: Normal.      Uterus: Normal.       Adnexa: Right adnexa normal and left adnexa normal.   Musculoskeletal:         General: Normal range of motion. Cervical back: Normal range of motion. Lymphadenopathy:      Upper Body:      Right upper body: No supraclavicular, axillary or pectoral adenopathy. Left upper body: No supraclavicular, axillary or pectoral adenopathy. Lower Body: No right inguinal adenopathy. No left inguinal adenopathy. Skin:     General: Skin is warm and dry. Neurological:      Mental Status: She is alert and oriented to person, place, and time. Psychiatric:         Behavior: Behavior normal.         Thought Content:  Thought content normal.         Judgment: Judgment normal.

## 2023-10-23 NOTE — PROGRESS NOTES
PT Evaluation     Today's date: 10/27/2023  Patient name: Mahad Xie  : 2000  MRN: [id number]  Referring provider: Nathalie Jeronimo MD  Dx:   Encounter Diagnosis     ICD-10-CM    1. Pelvic floor dysfunction  M62.89 Ambulatory Referral to Physical Therapy      2. MARIA (stress urinary incontinence, female)  N39.3       3. Dyspareunia in female  N94.10       4. Pain of pelvic girdle  R10.2           Start Time: 0915  Stop Time: 1010  Total time in clinic (min): 55 minutes    Assessment  Assessment details: Radha Thapa is a 26y.o. year old female with complaints of pelvic pressure, MARIA, dyspareunia. The following impairments were found on evaluation: poor bladder habits, incomplete emptying of bladder. These impairments contribute to the following functional limitations: decreased tolerance to activity and functional mobility; and the following disability: decreased quality of life and increased risk of infection. Radha Thapa  is a good candidate for therapy and would benefit from skilled physical therapy to address the above impairments in order to allow the patient to achieve below goals and return to her prior level of function. Patient education provided on PFM anatomy and function, bladder diary. Patient educated on diagnosis, plan of care and prognosis. They are in agreement with recommended plan of care, goals for therapy and demonstrates motivation for active participation in proposed plan of care. Thank you Dr. Vandana Eubanks for this referral!    Impairments: abnormal coordination, abnormal muscle firing, abnormal muscle tone, abnormal or restricted ROM, lacks appropriate home exercise program, pain with function, poor posture  and poor body mechanics  Barriers to therapy: none  Understanding of Dx/Px/POC: good   Prognosis: good    Goals  STG to be completed in 8 weeks from 10/27/2023:  1.  Radha Thapa will be independent with initial home exercise program.   [address] [place] [address] will demonstrate ability to contract, relax and actively lengthen PFM. [address] will report 50% improvement in UI.   4. Devi will report pain no greater than 4/10 with all functional activity to allow [place] to return to prior level of function. [address] will complete bladder diary  6. Nicola will participate in internal PFM exam.     LTG to be completed in 12 weeks from 10/27/2023 or upon discharge from PFPT:  1. Nicola will be independent with advanced home exercise program for self-management of symptoms. [address] will demonstrate ability to appropriately activate deep core muscles with functional mobility tasks. 3. Devi report 90% improvement or better in UI.   4. Nicola will report not needing to change clothes for 2 weeks due to UI. [address] will report pain no greater than 2/10 with all functional activity to allow [place] to return to prior level of function. 10. Nicola will report being able to participate in vaginal intercourse with no pain to allow patient improved quality of life. [address] will be able to participate in pain-free vaginal examination to allow for health maintenance and monitoring.            Plan  Patient would benefit from: skilled physical therapy  Planned modality interventions: biofeedback, cryotherapy, TENS and thermotherapy: hydrocollator packs  Planned therapy interventions: abdominal trunk stabilization, activity modification, ADL retraining, ADL training, balance, balance/weight bearing training, behavior modification, body mechanics training, breathing training, coordination, fine motor coordination training, flexibility, functional ROM exercises, gait training, graded activity, graded exercise, graded motor, home exercise program, IADL retraining, IASTM, joint mobilization, kinesiology taping, manual therapy, massage, Storm taping, motor coordination training, muscle pump exercises, nerve gliding, neuromuscular re-education, patient education, postural training, strengthening, stretching, therapeutic activities and therapeutic exercise  Frequency: 1x week  Duration in weeks: 12  Plan of Care beginning date: 10/27/2023  Plan of Care expiration date: 1/19/2024  Treatment plan discussed with: patient, PTA and referring physician        PT Pelvic Floor Subjective:   History of Present Illness:   Andres Ferguson is a 26 y.o. female who prefers she/her pronouns. They present to pelvic floor physical therapy with the primary complaint of pelvic pressure/heaviness, MARIA. They are referred to OPPT by Dr. Roshni Mckee. Fe Brink reports she had a baby 2 years ago with her first child, the healing process was ok but things then did not resolve. She has feeling pressure when standing up and feeling like things are falling out. She will sneeze and leak, and this has progressively gotten worse. Social Support:     Lives in:  Multiple-level home (no issues on stairs)    Lives with: significant other and child. Relationship status: /committed    Employment status: not working currently. Life stress severity: moderate (no coping - used to go to therapy)Pronouns: she/her  Hand dominance:  Right  Diet and Exercise:    Diet:balanced nutrition    Exercise type: no activity    Exercise frequency: never    Walks with child   Co-morbidities:    Patient Active Problem List:     Allergic rhinitis     Asthma     Dysmenorrhea     Generalized anxiety disorder     Mood disorder (HCC)     Chronic tension-type headache, not intractable    OB/ gyn History    Gestational History:     Prior Pregnancy: Yes      Number of prior pregnancies: 1    Number of term pregnancies: 1    Delivery Type: vaginal delivery      Number of vaginal deliveries: 1    Delivery Complications:   Tore anteriorly     Menstrual History:    Date of last menstrual period: 10/26/2023    Menstrual irregularities regular menses    Birth control: no contraception (starting Dec. 1 with IUD)  Bladder Function: Voiding Difficulties positive for: straining      Voiding Difficulties negative for: urgency, frequent urination, hesitancy and incomplete emptying       Voiding Difficulties comments:     Voiding frequency: every 3-4 hours and every 1-2 hours    Urinary leakage aggravated by: coughing, sneezing and laughing    Urinary leakage not aggravated by: bending, exercise, standing up, walking to the bathroom, hearing running water, key-in-the-door syndrome, seeing a toilet and post-void dribble    Nocturia (episodes per night): 0 and 1    Painful urination: No      Intake (ounces): Intake (ounces) comment: Sweet tea 16oz bottle (not every day)  Water 32 oz   Gatorade 16 oz bottle (not every day)  Juice pouches 8oz (2-3 per day)  Pepsi harleen 12oz can (1 per day)  Incontinence Management:     Pads/Diaper Use:  None    Pads/Diapers Additional Comments: will need to change underwear 2 x per week  Bowel Function:     Bowel frequency: daily and every 2 days    [place] Stool Scale: type 4    Uses "squatty potty": no Squatty Potty  Sexual Function:     Sexually Active:  Sexually active    Pain during intercourse: Yes      Lubrication Use: No      pain causes abstinenceSexual function: able to achieve orgasmSometimes pain with sex- with insertion feels like pressure   Pain at introitus :  Pain:     Current pain ratin    At best pain ratin    At worst pain ratin    Location:  Pelvic girdle pain    Quality:  Cramping and sharp    Exacerbated by: none.     Duration of symptoms:  Brief  Treatments:     None    Patient Goals:     Patient goals for therapy:  Decreased pain, fully empty bladder or bowels, improved bladder or bowel function, improved comfort, improved pain management, improved quality of life and relaxation    Other patient goals:  "to stop peeing my pants and to help with the pressure/pain when going to the bathroom and when standing in shower"      Objective   Graphical documentation:     Sometimes pain with sex- with insertion feels like pressure   Pain at introitus              Precautions: standard, latex allergy,        10/27/2023          Visit Number: #1 #2 #3 #4 #5 #6 #7 #8   Outcome Measure: NV **         Patient Ed           PFM anatomy and function KH          Bladder diary  KH           Double void            Toilet posture           Toilet mechanics           Good bladder habits           Pressure management                       Neuro Re-Ed           DB with PFM           DB with bridge and PFM           STS with PFM nad TrA                                                       Ther Ex           Warm-up            TrA iso            TrA with march           TrA with BKFO                                                       Ther Activity                                 Manual           PFM Exam   Supine and standing for prolapse **                                                     Modalities

## 2023-10-24 LAB
LAB AP GYN PRIMARY INTERPRETATION: NORMAL
Lab: NORMAL

## 2023-10-27 ENCOUNTER — EVALUATION (OUTPATIENT)
Age: 23
End: 2023-10-27
Payer: COMMERCIAL

## 2023-10-27 DIAGNOSIS — M62.89 PELVIC FLOOR DYSFUNCTION: Primary | ICD-10-CM

## 2023-10-27 DIAGNOSIS — N94.10 DYSPAREUNIA IN FEMALE: ICD-10-CM

## 2023-10-27 DIAGNOSIS — R10.2 PAIN OF PELVIC GIRDLE: ICD-10-CM

## 2023-10-27 DIAGNOSIS — N39.3 SUI (STRESS URINARY INCONTINENCE, FEMALE): ICD-10-CM

## 2023-10-27 PROCEDURE — 97162 PT EVAL MOD COMPLEX 30 MIN: CPT

## 2023-10-27 PROCEDURE — 97530 THERAPEUTIC ACTIVITIES: CPT

## 2023-10-31 NOTE — PROGRESS NOTES
Daily Note     Today's date: 10/31/2023  Patient name: Rodger Terrazas  : 2000  MRN: [id number]  Referring provider: Jordan Roberts MD  Dx:   Encounter Diagnosis     ICD-10-CM    1. Pelvic floor dysfunction  M62.89       2. Pain of pelvic girdle  R10.2       3. MARIA (stress urinary incontinence, female)  N39.3       4. Dyspareunia in female  N94.10                      Subjective: ***    History of Present Illness:   Rodger Terrazas is a 26 y.o. female who prefers she/her pronouns. They present to pelvic floor physical therapy with the primary complaint of pelvic pressure/heaviness, MARIA. They are referred to OPPT by Dr. Richard Carrion. Tien Cramer reports she had a baby 2 years ago with her first child, the healing process was ok but things then did not resolve. She has feeling pressure when standing up and feeling like things are falling out. She will sneeze and leak, and this has progressively gotten worse. Goals  STG to be completed in 8 weeks from 10/27/2023:  1. Tien Cramer will be independent with initial home exercise program.   Ursula Loyall Yuniel will demonstrate ability to contract, relax and actively lengthen PFM. Ursula Brice will report 50% improvement in UI.   4. Devi will report pain no greater than 4/10 with all functional activity to allow Tien Cramer to return to prior level of function. Ursula Brice will complete bladder diary  6. Tien Cramer will participate in internal PFM exam.      LTG to be completed in 12 weeks from 10/27/2023 or upon discharge from PFPT:  1. Tien Cramer will be independent with advanced home exercise program for self-management of symptoms. Ursula Brice will demonstrate ability to appropriately activate deep core muscles with functional mobility tasks. 3. Devi report 90% improvement or better in UI.   4. Tien Cramer will report not needing to change clothes for 2 weeks due to UI.   Ursula Brice will report pain no greater than 2/10 with all functional activity to allow Tien Cramer to return to prior level of function. 10. Arsen Payor will report being able to participate in vaginal intercourse with no pain to allow patient improved quality of life. [address] will be able to participate in pain-free vaginal examination to allow for health maintenance and monitoring. Objective: See treatment diary below      Assessment: Devi Alarcon tolerated today's treatment session well. Patient education provided on [facility] education options:[id number]}. Devi completed all TE with good form and no adverse reactions. ***  Devi continues to benefit from skilled OPPT services to address {khdailysymptoms:64124}. Will continue to address functional deficits and focus on progression of POC per patient tolerance. Patient performed {KUAerobic:82922} to increase blood flow to the area being treated, prepare the muscles for strength training and stretching, improve overall tolerance to activity, and aerobic endurance. Plan: Continue per plan of care. Progress treatment as tolerated.        Precautions: standard, latex allergy,        10/27/2023 11/3/2023         Visit Number: #1 #2 #3 #4 #5 #6 #7 #8   Outcome Measure: NV **         Patient Ed           PFM anatomy and function KH          Bladder diary  KH           Double void            Toilet posture           Toilet mechanics           Good bladder habits           Pressure management                       Neuro Re-Ed           DB with PFM           DB with bridge and PFM           STS with PFM nad TrA                                                       Ther Ex           Warm-up            TrA iso            TrA with march           TrA with BKFO                                                       Ther Activity                                 Manual           PFM Exam   Supine and standing for prolapse **                                                     Modalities

## 2023-11-03 ENCOUNTER — APPOINTMENT (OUTPATIENT)
Age: 23
End: 2023-11-03
Payer: COMMERCIAL

## 2023-11-07 NOTE — PROGRESS NOTES
Daily Note     Today's date: 11/10/2023  Patient name: Jose Dash  : 2000  MRN: [id number]  Referring provider: Deon Sanchez MD  Dx:   Encounter Diagnosis     ICD-10-CM    1. Pelvic floor dysfunction  M62.89       2. Pain of pelvic girdle  R10.2       3. MARIA (stress urinary incontinence, female)  N39.3       4. Dyspareunia in female  N94.10           Start Time: 7475  Stop Time: 0940  Total time in clinic (min): 45 minutes    Subjective: Pt reports she forgot to bring bladder diary with her. Nothing new to report. History of Present Illness:   Jose Dash is a 26 y.o. female who prefers she/her pronouns. They present to pelvic floor physical therapy with the primary complaint of pelvic pressure/heaviness, MARIA. They are referred to OPPT by Dr. Pa Hernandez. Clare Mary reports she had a baby 2 years ago with her first child, the healing process was ok but things then did not resolve. She has feeling pressure when standing up and feeling like things are falling out. She will sneeze and leak, and this has progressively gotten worse. Goals  STG to be completed in 8 weeks from 10/27/2023:  1. Clare Mary will be independent with initial home exercise program.   Ursula Brice will demonstrate ability to contract, relax and actively lengthen PFM. Ursula Brice will report 50% improvement in UI.   4. Devi will report pain no greater than 4/10 with all functional activity to allow Clare Mary to return to prior level of function. Ursula Brice will complete bladder diary  6. Clare Mary will participate in internal PFM exam.      LTG to be completed in 12 weeks from 10/27/2023 or upon discharge from PFPT:  1. Clare Mary will be independent with advanced home exercise program for self-management of symptoms. Ursula Brice will demonstrate ability to appropriately activate deep core muscles with functional mobility tasks.    3. Devi report 90% improvement or better in UI.   4. Clare Mary will report not needing to change clothes for 2 weeks due to UI. [address] will report pain no greater than 2/10 with all functional activity to allow Nicola to return to prior level of function. 10. Nicola will report being able to participate in vaginal intercourse with no pain to allow patient improved quality of life. [address] will be able to participate in pain-free vaginal examination to allow for health maintenance and monitoring. Objective: See treatment diary below    Pelvic Floor Muscle Exam:  Exam position: hooklying      Skin Inspection:  Scars present: No      Visual Inspection of Perineum:  Intact  Perineal Body:Bulging    Atrophic Changes: none     Excursion of perineal body with contraction of pelvic floor muscles: weak  Excursion of perineal body with bearing down of pelvic floor muscles: Fair      Pelvic Organ Prolapse:  Exam Position: Hooklying and standing     At Rest: Mild (>1 cm above hymenal remnants)  With Bearing Down: Moderate (to the level of hymenal remnants)  Location: Anterior    Pelvic Floor Muscles MMT:   Pelvic Floor Muscle Contraction: unable to Isolate; breath holding substitution  Pelvic Floor Muscle Relaxation: complete  Breath Holding: yes  PERF-Power Right: 3/5  PERF-Power Left: 2+/5  PERF-Endurance:  10 seconds  PERF-Reps (# to fatigue): 3  PERF- Flicks: 7  Cough Reflex: Absent had some bearing down, improved with cues to coordinate         Assessment: [facility] tolerated today's treatment session well. Patient education provided on toilet posture, toilet mechanics , healthy bladder habits, and double voiding technique. Devi completed all TE with good form and no adverse reactions. Pt does have weakness of PFMs and mild anterior prolapse of vaginal wall. Nicola continues to benefit from skilled OPPT services to address  POP type symptoms . Will continue to address functional deficits and focus on progression of POC per patient tolerance.         Plan: Continue per plan of care. Progress treatment as tolerated.        Precautions: standard, latex allergy,        10/27/2023 11/10/2023         Visit Number: #1 #2 #3 #4 #5 #6 #7 #8   Outcome Measure: NV **         Patient Ed           PFM anatomy and function KH          Bladder diary  KH  Will bring next visit          Double void   Handout provided         Toilet posture  Reviewed          Toilet mechanics  Passive urination          Good bladder habits  Handout and reviewed          Pressure management            Urge deferral at night                       Neuro Re-Ed           DB with PFM  HEP          DB with bridge and PFM           STS with PFM nad TrA                                                       Ther Ex           Warm-up            TrA iso            TrA with march           TrA with BKFO                                                       Ther Activity                                 Manual           PFM Exam   KH                                                      Modalities

## 2023-11-10 ENCOUNTER — OFFICE VISIT (OUTPATIENT)
Age: 23
End: 2023-11-10
Payer: COMMERCIAL

## 2023-11-10 DIAGNOSIS — M62.89 PELVIC FLOOR DYSFUNCTION: Primary | ICD-10-CM

## 2023-11-10 DIAGNOSIS — N39.3 SUI (STRESS URINARY INCONTINENCE, FEMALE): ICD-10-CM

## 2023-11-10 DIAGNOSIS — N94.10 DYSPAREUNIA IN FEMALE: ICD-10-CM

## 2023-11-10 DIAGNOSIS — R10.2 PAIN OF PELVIC GIRDLE: ICD-10-CM

## 2023-11-10 PROCEDURE — 97530 THERAPEUTIC ACTIVITIES: CPT

## 2023-11-10 PROCEDURE — 97140 MANUAL THERAPY 1/> REGIONS: CPT

## 2023-11-12 NOTE — PROGRESS NOTES
Daily Note     Today's date: 2023  Patient name: Mahad Xie  : 2000  MRN: [id number]  Referring provider: Nathalie Jeronimo MD  Dx:   Encounter Diagnosis     ICD-10-CM    1. Pelvic floor dysfunction  M62.89       2. Dyspareunia in female  N94.10       3. Pain of pelvic girdle  R10.2       4. MARIA (stress urinary incontinence, female)  N39.3                      Subjective: ***    History of Present Illness:   Mahad Xie is a 26 y.o. female who prefers she/her pronouns. They present to pelvic floor physical therapy with the primary complaint of pelvic pressure/heaviness, MARIA. They are referred to OPPT by Dr. Vandana Eubanks. Radha Thapa reports she had a baby 2 years ago with her first child, the healing process was ok but things then did not resolve. She has feeling pressure when standing up and feeling like things are falling out. She will sneeze and leak, and this has progressively gotten worse. Goals  STG to be completed in 8 weeks from 10/27/2023:  1. Radha Thapa will be independent with initial home exercise program.   Ursula Thatcherjorgito Brice will demonstrate ability to contract, relax and actively lengthen PFM. Ursula Brice will report 50% improvement in UI.   4. Devi will report pain no greater than 4/10 with all functional activity to allow Radha Thapa to return to prior level of function. Ursula Brice will complete bladder diary  6. Radha Thapa will participate in internal PFM exam.      LTG to be completed in 12 weeks from 10/27/2023 or upon discharge from PFPT:  1. Radha Thapa will be independent with advanced home exercise program for self-management of symptoms. Ursula Brice will demonstrate ability to appropriately activate deep core muscles with functional mobility tasks. 3. Devi report 90% improvement or better in UI.   4. Radha Thapa will report not needing to change clothes for 2 weeks due to UI.   Ursula Brice will report pain no greater than 2/10 with all functional activity to allow Radha Thapa to return to prior level of function. 10. Madi Guardado will report being able to participate in vaginal intercourse with no pain to allow patient improved quality of life. [facility] will be able to participate in pain-free vaginal examination to allow for health maintenance and monitoring. Objective: See treatment diary below      Assessment: Devi Alarcon tolerated today's treatment session well. Patient education provided on [facility] education options:53033}. Devi completed all TE with good form and no adverse reactions. ***  Devi continues to benefit from skilled OPPT services to address {khdailysymptoms:91629}. Will continue to address functional deficits and focus on progression of POC per patient tolerance. Patient performed {KUAerobic:51017} to increase blood flow to the area being treated, prepare the muscles for strength training and stretching, improve overall tolerance to activity, and aerobic endurance. Plan: Continue per plan of care. Progress treatment as tolerated.        Precautions: standard, latex allergy,        10/27/2023 11/10/2023 11/16/2023        Visit Number: #1 #2 #3 #4 #5 #6 #7 #8   Outcome Measure: NV **         Patient Ed           PFM anatomy and function KH          Bladder diary  KH  Will bring next visit          Double void   Handout provided         Toilet posture  Reviewed          Toilet mechanics  Passive urination          Good bladder habits  Handout and reviewed          Pressure management            Urge deferral at night                       Neuro Re-Ed           DB with PFM  HEP          DB with bridge and PFM           STS with PFM nad TrA                                                       Ther Ex           Warm-up            TrA iso            TrA with march           TrA with BKFO                                                       Ther Activity                                 Manual           PFM Exam   [address] [facility] Modalities

## 2023-11-17 ENCOUNTER — OFFICE VISIT (OUTPATIENT)
Age: 23
End: 2023-11-17
Payer: COMMERCIAL

## 2023-11-17 ENCOUNTER — APPOINTMENT (OUTPATIENT)
Age: 23
End: 2023-11-17
Payer: COMMERCIAL

## 2023-11-17 VITALS
TEMPERATURE: 95.9 F | HEART RATE: 69 BPM | WEIGHT: 165 LBS | BODY MASS INDEX: 26.52 KG/M2 | SYSTOLIC BLOOD PRESSURE: 127 MMHG | HEIGHT: 66 IN | OXYGEN SATURATION: 100 % | RESPIRATION RATE: 18 BRPM | DIASTOLIC BLOOD PRESSURE: 78 MMHG

## 2023-11-17 DIAGNOSIS — N94.10 DYSPAREUNIA IN FEMALE: ICD-10-CM

## 2023-11-17 DIAGNOSIS — R10.2 PAIN OF PELVIC GIRDLE: ICD-10-CM

## 2023-11-17 DIAGNOSIS — N39.3 SUI (STRESS URINARY INCONTINENCE, FEMALE): ICD-10-CM

## 2023-11-17 DIAGNOSIS — J06.9 VIRAL URI: Primary | ICD-10-CM

## 2023-11-17 DIAGNOSIS — M62.89 PELVIC FLOOR DYSFUNCTION: Primary | ICD-10-CM

## 2023-11-17 PROCEDURE — 99213 OFFICE O/P EST LOW 20 MIN: CPT | Performed by: EMERGENCY MEDICINE

## 2023-11-17 RX ORDER — PREDNISONE 10 MG/1
TABLET ORAL
Qty: 27 TABLET | Refills: 0 | Status: SHIPPED | OUTPATIENT
Start: 2023-11-17

## 2023-11-17 NOTE — PROGRESS NOTES
[facility]        NAME: Joel Lopez is a 26 y.o. female  : 2000    MRN: [id number]  DATE: 2023  TIME: 11:51 AM    Assessment and Plan   Viral URI [J06.9]  1. Viral URI  predniSONE 10 mg tablet            Patient Instructions     Patient Instructions   You have been diagnosed with a Viral Upper Respiratory infection and your symptoms should resolve over the next 7 to 10 days with the treatments recommended today. If they do not, it is possible that you have developed a bacterial infection and you should return. If you were to take an antibiotic while you are still in the viral stage, you will not get better any faster, but could kill off the good germs in your body as well as make the germs in you resistant to the antibiotic. Take an expectorant - guaifenesin should be the only ingredient - during the day, and the cough suppressant (ex. Robitussin DM or Tessalon) if needed at night only. Take Zinc 50 mg every 12 hours for the next week (the dose is important so do not just take a multivitamin with zinc or an over-the-counter cold med with zinc such as Airborne or Zicam, as that will not give you the sufficient dose). You should also take Vitamin D3 5000 i.u.s per day for the next 1 week, and Vitamin-C 1 g twice daily (and again dosages are important, do not think you are getting enough vitamin C just by drinking extra orange juice). You may use Flonase as discussed. You may also take a decongestant like Sudafed, unless you have hypertension or cardiac disease. Avoid nasal sprays like Afrin or other vasoconstrictors. If you are diabetic you should adhere strictly to your diabetic diet and monitor blood sugar closely while on prednisone and you should discontinue the prednisone if blood sugar becomes significantly elevated. Avoid nonsteroidal anti-inflammatories like Advil or Aleve while on prednisone.      Upper Respiratory Infection   AMBULATORY CARE:   An upper respiratory infection  is also called a cold. Your nose, throat, ears, and sinuses may be affected. You are more likely to get a cold in the winter. Your risk of getting a cold may be increased if you smoke cigarettes or have allergies, such as hay fever. What causes a cold? A cold is caused by a virus. Many viruses can cause a cold, and each is contagious. This means the virus can be easily spread to another person when the sick person coughs or sneezes. The virus can also be spread if you touch an object the virus is on and then touch your eyes, mouth, or nose. Cold symptoms  are usually worst for the first 3 to 5 days. You may have any of the following:  Runny or stuffy nose    Sneezing and coughing    Sore throat or hoarseness    Red, watery, and sore eyes    Fatigue (you feel more tired than usual)    Chills and fever    Headache, body aches, or sore muscles    Call your local emergency number (911 in the [address]) if:   You have chest pain or trouble breathing. Seek care immediately if:   You have a fever over 102ºF (39ºC). Call your doctor if:   You have a low fever. Your sore throat gets worse or you see white or yellow spots in your throat. Your symptoms get worse after 3 to 5 days or are not better in 14 days. You have a rash anywhere on your skin. You have large, tender lumps in your neck. You have thick, green, or yellow drainage from your nose. You cough up thick yellow, green, or bloody mucus. You have a bad earache. You have questions or concerns about your condition or care. Treatment:  Colds are caused by viruses and do not get better with antibiotics. Most people get better in 7 to 14 days. You may continue to cough for 2 to 3 weeks. The following may help decrease your symptoms:  Decongestants  help reduce nasal congestion and help you breathe more easily. If you take decongestant pills, they may make you feel restless or not able to sleep.  Do not use decongestant sprays for more than a few days. Cough suppressants  help reduce coughing. Ask your healthcare provider which type of cough medicine is best for you. NSAIDs , such as ibuprofen, help decrease swelling, pain, and fever. NSAIDs can cause stomach bleeding or kidney problems in certain people. If you take blood thinner medicine, always ask your healthcare provider if NSAIDs are safe for you. Always read the medicine label and follow directions. Acetaminophen  decreases pain and fever. It is available without a doctor's order. Ask how much to take and how often to take it. Follow directions. Read the labels of all other medicines you are using to see if they also contain acetaminophen, or ask your doctor or pharmacist. Acetaminophen can cause liver damage if not taken correctly. Do not use more than 4 grams (4,000 milligrams) total of acetaminophen in one day. Manage a cold:   Rest as much as possible. Slowly start to do more each day. Drink more liquids as directed. Liquids will help thin and loosen mucus so you can cough it up. Liquids will also help prevent dehydration. Liquids that help prevent dehydration include water, fruit juice, and broth. Do not drink liquids that contain caffeine. Caffeine can increase your risk for dehydration. Ask your healthcare provider how much liquid to drink each day. Soothe a sore throat. Gargle with warm salt water. Make salt water by dissolving ¼ teaspoon salt in 1 cup warm water. You may also suck on hard candy or throat lozenges. You may use a sore throat spray. Use a humidifier or vaporizer. Use a cool mist humidifier or a vaporizer to increase air moisture in your home. This may make it easier for you to breathe and help decrease your cough. Use saline nasal drops as directed. These help relieve congestion. Apply petroleum-based jelly around the outside of your nostrils. This can decrease irritation from blowing your nose. Do not smoke.   Nicotine and other chemicals in cigarettes and cigars can make your symptoms worse. They can also cause infections such as bronchitis or pneumonia. Ask your healthcare provider for information if you currently smoke and need help to quit. E-cigarettes or smokeless tobacco still contain nicotine. Talk to your healthcare provider before you use these products. Prevent a cold: Wash your hands often. Use soap and water every time you wash your hands. Rub your soapy hands together, lacing your fingers. Use the fingers of one hand to scrub under the nails of the other hand. Wash for at least 20 seconds. Rinse with warm, running water for several seconds. Then dry your hands. Use germ-killing gel if soap and water are not available. Do not touch your eyes or mouth without washing your hands first.     Cover a sneeze or cough. Use a tissue that covers your mouth and nose. Put the used tissue in the trash right away. Use the bend of your arm if a tissue is not available. Wash your hands well with soap and water or use a hand . Do not stand close to anyone who is sneezing or coughing. Try to stay away from others while you are sick. This is especially important during the first 2 to 3 days when the virus is more easily spread. Wait until a fever, cough, or other symptoms are gone before you return to work or other regular activities. Do not share items while you are sick. This includes food, drinks, eating utensils, and dishes. Follow up with your doctor as directed:  Write down your questions so you remember to ask them during your visits. © Copyright [employer] 2022 Information is for End User's use only and may not be sold, redistributed or otherwise used for commercial purposes. All illustrations and images included in CareNotes® are the copyrighted property of [employer] or [address] [place] [address]  The above information is an  only.  It is not intended as medical advice for individual conditions or treatments. Talk to your doctor, nurse or pharmacist before following any medical regimen to see if it is safe and effective for you. Fluid In The Ear (Serous Otitis Media)   WHAT YOU NEED TO KNOW:   LU is fluid trapped in the middle of your ear behind your eardrum. This condition usually develops without signs or symptoms of an ear infection. Serous otitis media may be caused by an upper respiratory infection or allergies. It is most common in the fall and early spring. DISCHARGE INSTRUCTIONS:   Call your doctor if:   You develop severe ear pain. The outside of your ear is red or swollen. You have fluid coming from your ear. You have questions or concerns about your condition or care. How to stay healthy:   Wash your hands often throughout the day. Use soap and water. Rub your soapy hands together, lacing your fingers, for at least 20 seconds. Rinse with warm, running water. Dry your hands with a clean towel or paper towel. Use hand  that contains alcohol if soap and water are not available. Teach children how to wash their hands and use hand . Avoid people who are sick. Some germs are easily and quickly spread through contact. Follow up with your doctor as directed:  Write down your questions so you remember to ask them during your visits. © Copyright [employer] 2023 Information is for End User's use only and may not be sold, redistributed or otherwise used for commercial purposes. The above information is an  only. It is not intended as medical advice for individual conditions or treatments. Talk to your doctor, nurse or pharmacist before following any medical regimen to see if it is safe and effective for you. Follow up with PCP in 3-5 days. Proceed to  ER if symptoms worsen. Chief Complaint     Chief Complaint   Patient presents with    Cough     Cough for more than a week. Sore throat.  Noticed an ulcer on back of tongue. Ear pain on right side. Sinus congestion, runny nose. Cough is now productive. Pain in upper back/base of neck. Diarrhea in the beginning of the week and fevers, now resolved. Seen by doctor two to three days ago and tested negative for strep throat, given Flonase but is not helping per patient. History of Present Illness       Patient complains of cough and congestion for the past week with right-sided ear pain the past few days. Patient recently seen by her PCP who diagnosed her with viral URI after negative strep test.  He was advised to take Flonase which she has been using according directions but without any improvement. Cough  Associated symptoms include ear pain, headaches and a sore throat. Pertinent negatives include no shortness of breath. Sore Throat   This is a new problem. The current episode started 1 to 4 weeks ago. The problem has been gradually worsening. Neither side of throat is experiencing more pain than the other. The maximum temperature recorded prior to her arrival was 100 - 100.9 F. The fever has been present for 1 to 2 days. The pain is at a severity of 8/10. Associated symptoms include congestion, coughing, diarrhea, ear pain, headaches, a hoarse voice, a plugged ear sensation, neck pain, swollen glands and trouble swallowing. Pertinent negatives include no drooling, ear discharge, shortness of breath, stridor or vomiting. She has had no exposure to strep or mono. Review of Systems   Review of Systems   HENT:  Positive for congestion, ear pain, hoarse voice, sore throat and trouble swallowing. Negative for drooling and ear discharge. Respiratory:  Positive for cough. Negative for shortness of breath and stridor. Gastrointestinal:  Positive for diarrhea. Negative for vomiting. Musculoskeletal:  Positive for neck pain. Neurological:  Positive for headaches.          Current Medications       Current Outpatient Medications:     Multiple Vitamins-Minerals (IMMUNE SUPPORT PO), Take by mouth, Disp: , Rfl:     predniSONE 10 mg tablet, Take once daily all days pills on this schedule 6- 6- 5- 4- 3- 2- 1, Disp: 27 tablet, Rfl: 0    rizatriptan (MAXALT-MLT) 10 mg disintegrating tablet, Take 1 tablet (10 mg total) by mouth once as needed for migraine for up to 30 doses may repeat in 2 hours if necessary, Disp: 10 tablet, Rfl: 5    Current Allergies     Allergies as of 11/17/2023 - Reviewed 11/17/2023   Allergen Reaction Noted    Sulfa antibiotics Vomiting 06/15/2013    Latex Rash 11/29/2021            The following portions of the patient's history were reviewed and updated as appropriate: allergies, current medications, past family history, past medical history, past social history, past surgical history and problem list.     Past Medical History:   Diagnosis Date    Asthma     Bipolar disorder ([address])     Chlamydia     COVID-19 virus infection 05/19/2021    Migraine        Past Surgical History:   Procedure Laterality Date    TYMPANOPLASTY Right     TYMPANOSTOMY TUBE PLACEMENT         Family History   Problem Relation Age of Onset    Multiple sclerosis Mother     Thyroid disease Mother     Fibromyalgia Mother     Migraines Mother     Depression Mother     Kidney cancer Maternal Grandmother     Heart attack Maternal Grandmother     Lupus Maternal Grandmother     Skin cancer Maternal Grandmother     Heart disease Maternal Grandmother     Diabetes Maternal Grandfather     Diabetes Maternal Aunt     Polycystic ovary syndrome Maternal Aunt     Depression Maternal Aunt     Anxiety disorder Maternal Aunt     Cancer Maternal Uncle     Depression Father     Bipolar disorder Father     No Known Problems Paternal Grandmother     Cancer Paternal Grandfather     Asperger's syndrome Half-Brother     Suicidality Half-Brother     Mental illness Neg Hx     Substance Abuse Neg Hx     Breast cancer Neg Hx     Colon cancer Neg Hx     Ovarian cancer Neg Hx          Medications have been verified. Objective   /78   Pulse 69   Temp (!) 95.9 °F (35.5 °C)   Resp 18   Ht 5' 5.5" (1.664 m)   Wt 74.8 kg (165 lb)   LMP 10/27/2023 (Approximate)   SpO2 100%   BMI 27.04 kg/m²        Physical Exam     Physical Exam  Vitals and nursing note reviewed. Constitutional:       General: She is not in acute distress. Appearance: She is well-developed. She is not ill-appearing or toxic-appearing. HENT:      Head: Normocephalic and atraumatic. Right Ear: External ear normal.      Left Ear: External ear normal.      Nose: Mucosal edema and congestion present. Mouth/Throat:      Pharynx: Posterior oropharyngeal erythema present. No oropharyngeal exudate. Tonsils: No tonsillar abscesses. Cardiovascular:      Rate and Rhythm: Normal rate and regular rhythm. Pulmonary:      Effort: Pulmonary effort is normal. No respiratory distress. Breath sounds: No wheezing or rales. Musculoskeletal:      Cervical back: Neck supple. Skin:     General: Skin is warm and dry. Neurological:      Mental Status: She is alert and oriented to person, place, and time. Psychiatric:         Mood and Affect: Mood normal.         Behavior: Behavior normal.         Thought Content:  Thought content normal.         Judgment: Judgment normal.

## 2023-11-17 NOTE — PATIENT INSTRUCTIONS
You have been diagnosed with a Viral Upper Respiratory infection and your symptoms should resolve over the next 7 to 10 days with the treatments recommended today. If they do not, it is possible that you have developed a bacterial infection and you should return. If you were to take an antibiotic while you are still in the viral stage, you will not get better any faster, but could kill off the good germs in your body as well as make the germs in you resistant to the antibiotic. Take an expectorant - guaifenesin should be the only ingredient - during the day, and the cough suppressant (ex. Robitussin DM or Tessalon) if needed at night only. Take Zinc 50 mg every 12 hours for the next week (the dose is important so do not just take a multivitamin with zinc or an over-the-counter cold med with zinc such as Airborne or Zicam, as that will not give you the sufficient dose). You should also take Vitamin D3 5000 i.u.s per day for the next 1 week, and Vitamin-C 1 g twice daily (and again dosages are important, do not think you are getting enough vitamin C just by drinking extra orange juice). You may use Flonase as discussed. You may also take a decongestant like Sudafed, unless you have hypertension or cardiac disease. Avoid nasal sprays like Afrin or other vasoconstrictors. If you are diabetic you should adhere strictly to your diabetic diet and monitor blood sugar closely while on prednisone and you should discontinue the prednisone if blood sugar becomes significantly elevated. Avoid nonsteroidal anti-inflammatories like Advil or Aleve while on prednisone. Upper Respiratory Infection   AMBULATORY CARE:   An upper respiratory infection  is also called a cold. Your nose, throat, ears, and sinuses may be affected. You are more likely to get a cold in the winter. Your risk of getting a cold may be increased if you smoke cigarettes or have allergies, such as hay fever. What causes a cold?   A cold is caused by a virus. Many viruses can cause a cold, and each is contagious. This means the virus can be easily spread to another person when the sick person coughs or sneezes. The virus can also be spread if you touch an object the virus is on and then touch your eyes, mouth, or nose. Cold symptoms  are usually worst for the first 3 to 5 days. You may have any of the following:  Runny or stuffy nose    Sneezing and coughing    Sore throat or hoarseness    Red, watery, and sore eyes    Fatigue (you feel more tired than usual)    Chills and fever    Headache, body aches, or sore muscles    Call your local emergency number (911 in the [address]) if:   You have chest pain or trouble breathing. Seek care immediately if:   You have a fever over 102ºF (39ºC). Call your doctor if:   You have a low fever. Your sore throat gets worse or you see white or yellow spots in your throat. Your symptoms get worse after 3 to 5 days or are not better in 14 days. You have a rash anywhere on your skin. You have large, tender lumps in your neck. You have thick, green, or yellow drainage from your nose. You cough up thick yellow, green, or bloody mucus. You have a bad earache. You have questions or concerns about your condition or care. Treatment:  Colds are caused by viruses and do not get better with antibiotics. Most people get better in 7 to 14 days. You may continue to cough for 2 to 3 weeks. The following may help decrease your symptoms:  Decongestants  help reduce nasal congestion and help you breathe more easily. If you take decongestant pills, they may make you feel restless or not able to sleep. Do not use decongestant sprays for more than a few days. Cough suppressants  help reduce coughing. Ask your healthcare provider which type of cough medicine is best for you. NSAIDs , such as ibuprofen, help decrease swelling, pain, and fever. NSAIDs can cause stomach bleeding or kidney problems in certain people. If you take blood thinner medicine, always ask your healthcare provider if NSAIDs are safe for you. Always read the medicine label and follow directions. Acetaminophen  decreases pain and fever. It is available without a doctor's order. Ask how much to take and how often to take it. Follow directions. Read the labels of all other medicines you are using to see if they also contain acetaminophen, or ask your doctor or pharmacist. Acetaminophen can cause liver damage if not taken correctly. Do not use more than 4 grams (4,000 milligrams) total of acetaminophen in one day. Manage a cold:   Rest as much as possible. Slowly start to do more each day. Drink more liquids as directed. Liquids will help thin and loosen mucus so you can cough it up. Liquids will also help prevent dehydration. Liquids that help prevent dehydration include water, fruit juice, and broth. Do not drink liquids that contain caffeine. Caffeine can increase your risk for dehydration. Ask your healthcare provider how much liquid to drink each day. Soothe a sore throat. Gargle with warm salt water. Make salt water by dissolving ¼ teaspoon salt in 1 cup warm water. You may also suck on hard candy or throat lozenges. You may use a sore throat spray. Use a humidifier or vaporizer. Use a cool mist humidifier or a vaporizer to increase air moisture in your home. This may make it easier for you to breathe and help decrease your cough. Use saline nasal drops as directed. These help relieve congestion. Apply petroleum-based jelly around the outside of your nostrils. This can decrease irritation from blowing your nose. Do not smoke. Nicotine and other chemicals in cigarettes and cigars can make your symptoms worse. They can also cause infections such as bronchitis or pneumonia. Ask your healthcare provider for information if you currently smoke and need help to quit. E-cigarettes or smokeless tobacco still contain nicotine.  Talk to your healthcare provider before you use these products. Prevent a cold: Wash your hands often. Use soap and water every time you wash your hands. Rub your soapy hands together, lacing your fingers. Use the fingers of one hand to scrub under the nails of the other hand. Wash for at least 20 seconds. Rinse with warm, running water for several seconds. Then dry your hands. Use germ-killing gel if soap and water are not available. Do not touch your eyes or mouth without washing your hands first.     Cover a sneeze or cough. Use a tissue that covers your mouth and nose. Put the used tissue in the trash right away. Use the bend of your arm if a tissue is not available. Wash your hands well with soap and water or use a hand . Do not stand close to anyone who is sneezing or coughing. Try to stay away from others while you are sick. This is especially important during the first 2 to 3 days when the virus is more easily spread. Wait until a fever, cough, or other symptoms are gone before you return to work or other regular activities. Do not share items while you are sick. This includes food, drinks, eating utensils, and dishes. Follow up with your doctor as directed:  Write down your questions so you remember to ask them during your visits. © Copyright [employer] 2022 Information is for End User's use only and may not be sold, redistributed or otherwise used for commercial purposes. All illustrations and images included in CareNotes® are the copyrighted property of [employer]. or [address] [place] [address]  The above information is an  only. It is not intended as medical advice for individual conditions or treatments. Talk to your doctor, nurse or pharmacist before following any medical regimen to see if it is safe and effective for you. Fluid In The Ear (Serous Otitis Media)   WHAT YOU NEED TO KNOW:   LU is fluid trapped in the middle of your ear behind your eardrum.  This condition usually develops without signs or symptoms of an ear infection. Serous otitis media may be caused by an upper respiratory infection or allergies. It is most common in the fall and early spring. DISCHARGE INSTRUCTIONS:   Call your doctor if:   You develop severe ear pain. The outside of your ear is red or swollen. You have fluid coming from your ear. You have questions or concerns about your condition or care. How to stay healthy:   Wash your hands often throughout the day. Use soap and water. Rub your soapy hands together, lacing your fingers, for at least 20 seconds. Rinse with warm, running water. Dry your hands with a clean towel or paper towel. Use hand  that contains alcohol if soap and water are not available. Teach children how to wash their hands and use hand . Avoid people who are sick. Some germs are easily and quickly spread through contact. Follow up with your doctor as directed:  Write down your questions so you remember to ask them during your visits. © Copyright [employer] 2023 Information is for End User's use only and may not be sold, redistributed or otherwise used for commercial purposes. The above information is an  only. It is not intended as medical advice for individual conditions or treatments. Talk to your doctor, nurse or pharmacist before following any medical regimen to see if it is safe and effective for you.

## 2023-12-01 ENCOUNTER — PROCEDURE VISIT (OUTPATIENT)
Dept: OBGYN CLINIC | Facility: CLINIC | Age: 23
End: 2023-12-01
Payer: COMMERCIAL

## 2023-12-01 VITALS
BODY MASS INDEX: 27.61 KG/M2 | DIASTOLIC BLOOD PRESSURE: 75 MMHG | HEIGHT: 66 IN | WEIGHT: 171.8 LBS | SYSTOLIC BLOOD PRESSURE: 125 MMHG

## 2023-12-01 DIAGNOSIS — Z01.812 PRE-PROCEDURE LAB EXAM: ICD-10-CM

## 2023-12-01 DIAGNOSIS — Z30.430 ENCOUNTER FOR INITIAL INSERTION OF INTRAUTERINE CONTRACEPTIVE DEVICE: Primary | ICD-10-CM

## 2023-12-01 PROCEDURE — 58300 INSERT INTRAUTERINE DEVICE: CPT | Performed by: OBSTETRICS & GYNECOLOGY

## 2023-12-01 PROCEDURE — 81025 URINE PREGNANCY TEST: CPT | Performed by: OBSTETRICS & GYNECOLOGY

## 2023-12-01 NOTE — PROGRESS NOTES
Iud insertions    Date/Time: 12/1/2023 8:30 AM    Performed by: Cathy Thapa MD  Authorized by: Cathy Thapa MD    Verbal consent obtained?: Yes    Written consent obtained?: Yes    Risks and benefits: Risks, benefits and alternatives were discussed    Consent given by:  Patient  Patient states understanding of procedure being performed: Yes    Patient's understanding of procedure matches consent: Yes    Procedure consent matches procedure scheduled: Yes    Relevant documents present and verified: Yes    Radiology Images displayed and confirmed.  If images not available, report reviewed: Yes    Required items: Required blood products, implants, devices and special equipment available    Patient identity confirmed:  Verbally with patient  Procedure:     Negative urine pregnancy test: yes      Cervix cleaned and prepped: yes      Speculum placed in vagina: yes      Tenaculum applied to cervix: yes      Uterus sounded: yes      Uterus sound depth (cm):  8    IUD inserted with no complications: yes      IUD type:  Ni    Strings trimmed: yes    Post-procedure:     Patient tolerated procedure well: yes

## 2023-12-11 ENCOUNTER — TELEPHONE (OUTPATIENT)
Dept: OBGYN CLINIC | Facility: CLINIC | Age: 23
End: 2023-12-11

## 2023-12-11 DIAGNOSIS — R58 EXCESSIVE BLEEDING: Primary | ICD-10-CM

## 2023-12-12 ENCOUNTER — TELEPHONE (OUTPATIENT)
Dept: HEMATOLOGY ONCOLOGY | Facility: CLINIC | Age: 23
End: 2023-12-12

## 2023-12-12 NOTE — TELEPHONE ENCOUNTER
I called Madi Guardado in response to a referral that was received for patient to establish care with Hematology. Outreach was made to schedule a consultation. I left a voicemail explaining the reason for my call and advised patient to call [facility] at [phone]. Another attempt will be made to contact patient.

## 2024-01-08 ENCOUNTER — OFFICE VISIT (OUTPATIENT)
Age: 24
End: 2024-01-08
Payer: COMMERCIAL

## 2024-01-08 VITALS
BODY MASS INDEX: 27.48 KG/M2 | HEART RATE: 68 BPM | SYSTOLIC BLOOD PRESSURE: 118 MMHG | HEIGHT: 66 IN | DIASTOLIC BLOOD PRESSURE: 70 MMHG | TEMPERATURE: 97.2 F | WEIGHT: 171 LBS | OXYGEN SATURATION: 99 % | RESPIRATION RATE: 18 BRPM

## 2024-01-08 DIAGNOSIS — H10.33 ACUTE CONJUNCTIVITIS OF BOTH EYES, UNSPECIFIED ACUTE CONJUNCTIVITIS TYPE: Primary | ICD-10-CM

## 2024-01-08 DIAGNOSIS — N76.0 ACUTE VAGINITIS: ICD-10-CM

## 2024-01-08 PROCEDURE — 99213 OFFICE O/P EST LOW 20 MIN: CPT

## 2024-01-08 RX ORDER — FLUCONAZOLE 150 MG/1
150 TABLET ORAL ONCE
Qty: 1 TABLET | Refills: 0 | Status: SHIPPED | OUTPATIENT
Start: 2024-01-08 | End: 2024-01-08

## 2024-01-08 RX ORDER — OFLOXACIN 3 MG/ML
SOLUTION/ DROPS OPHTHALMIC EVERY 4 HOURS
Qty: 5 ML | Refills: 0 | Status: SHIPPED | OUTPATIENT
Start: 2024-01-08 | End: 2024-01-15

## 2024-01-09 NOTE — PATIENT INSTRUCTIONS
Conjunctivitis   AMBULATORY CARE:   Conjunctivitis , or pink eye, is inflammation of your conjunctiva. The conjunctiva is a thin tissue that covers the front of your eye and the back of your eyelid. The conjunctiva helps protect your eye and keep it moist. The most common cause of conjunctivitis is infection with bacteria or a virus. Allergies or exposure to a chemical may also cause conjunctivitis. Conjunctivitis is easily spread from person to person.       Common signs or symptoms:  You may have symptoms in one or both eyes. You may also have other general symptoms, including a sore throat, runny nose, or fever. You may have any of the following:  Redness in the whites of your eye    Itching in or around your eye    Feeling like there is something in your eye    Watery or thick, sticky discharge    Crusty eyelids when you wake up in the morning    Burning, stinging, or swelling in your eye    Pain when you see bright light    Seek care immediately if:   You have worsening eye pain.    The swelling in your eye gets worse, even after treatment.    Your vision suddenly becomes worse, or you cannot see at all.    Call your doctor if:   Your start to notice changes in your vision.    You develop a fever and ear pain.    You have tiny bumps or spots of blood on your eye.    You have questions or concerns about your condition or care.    Treatment:  Your conjunctivitis may go away on its own. Treatment depends on what is causing your conjunctivitis. You may need any of the following:  Allergy medicine  helps decrease itchy, red, swollen eyes caused by allergies. It may be given as a pill, eye drops, or nasal spray.    Antibiotics  may be needed if your conjunctivitis is caused by bacteria. This medicine may be given as a pill, eye drops, or eye ointment.    Manage your symptoms:   Apply a cool compress.  Wet a washcloth with cold water and place it on your eye. This will help decrease itching and irritation.    Use  artificial tears.  This will help lessen your symptoms, including itching or irritation.    Do not wear contact lenses  until treatment is complete and your symptoms are gone.    Flush your eye.  You may need to flush your eye with saline to help decrease your symptoms. Ask for more information on how to flush your eye.    Prevent the spread of conjunctivitis:   Wash your hands with soap and water often.  Wash your hands before and after you touch your eyes. Wash your hands after you use the bathroom, change a child's diaper, or sneeze. Wash your hands before you prepare or eat food.         Avoid contact with others.  Do not share towels or washcloths. Try to stay away from others as much as possible. Ask when you can return to work or school.    Avoid allergens and irritants.  Try to avoid the things that cause your allergies, such as pets, dust, or grass. Stay away from smoke filled areas. Shield your eyes from wind and sun.    Throw away eye makeup.  Bacteria can stay in eye makeup. Throw away your current mascara and other eye makeup. Never share mascara or other eye makeup with anyone.    Follow up with your doctor as directed:  You may be referred to an ophthalmologist for treatment. Write down your questions so you remember to ask them during your visits.  © Copyright [employer] 2023 Information is for End User's use only and may not be sold, redistributed or otherwise used for commercial purposes.  The above information is an  only. It is not intended as medical advice for individual conditions or treatments. Talk to your doctor, nurse or pharmacist before following any medical regimen to see if it is safe and effective for you.

## 2024-01-09 NOTE — PROGRESS NOTES
[facility]        NAME: Devi Alarcon is a 23 y.o. female  : 2000    MRN: [id number]  DATE: 2024  TIME: 7:31 PM    Assessment and Plan   Acute conjunctivitis of both eyes, unspecified acute conjunctivitis type [H10.33]  1. Acute conjunctivitis of both eyes, unspecified acute conjunctivitis type  ofloxacin (OCUFLOX) 0.3 % ophthalmic solution      2. Acute vaginitis  fluconazole (DIFLUCAN) 150 mg tablet      Follow up with PCP if no improvement.   Patient Instructions     Ophthalmic eye abx given today. Explained contagious nature of pink eye. Avoid rubbing eye and wash hands frequently.    Follow-up with PCP in the next 3-5 days if no improvement.   Go to the ED if symptoms severely worsen.    Chief Complaint     Chief Complaint   Patient presents with    Eye Problem     C/o red eye and and crusty eyes x several days.     Vaginitis     C/o white vaginal discharge. Pt. States she was recently on an abx. Has a hx of yeast infection after abx use.      History of Present Illness     Devi Alarcon is a 23 y.o. female presenting to the office today for eye itching. Symptoms have been present for 2 days, and include eye itching, redness, drainage.   She has tried leftover eyedrops for her symptoms, some relief.  She also notes being positive for a yeast infection at home and is requesting treatment for that as well.   Sick contacts include: daughter  Recent travel: none    Review of Systems     Review of Systems   Constitutional:  Negative for chills and fever.   HENT:  Negative for ear pain and sore throat.    Eyes:  Positive for discharge, redness and itching. Negative for pain and visual disturbance.   Respiratory: Negative.  Negative for cough and shortness of breath.    Cardiovascular:  Negative for chest pain and palpitations.   Gastrointestinal: Negative.  Negative for abdominal pain, nausea and vomiting.   Genitourinary: Negative.  Negative for difficulty urinating.    Musculoskeletal:  Negative for arthralgias, back pain and myalgias.   Skin:  Negative for color change and rash.   Neurological:  Negative for seizures and syncope.   All other systems reviewed and are negative.      Current Medications       Current Outpatient Medications:     fluconazole (DIFLUCAN) 150 mg tablet, Take 1 tablet (150 mg total) by mouth once for 1 dose, Disp: 1 tablet, Rfl: 0    Multiple Vitamins-Minerals (IMMUNE SUPPORT PO), Take by mouth, Disp: , Rfl:     ofloxacin (OCUFLOX) 0.3 % ophthalmic solution, Administer 1 drop to both eyes every 4 (four) hours for 2 days, THEN 1 drop 4 (four) times a day for 5 days., Disp: 5 mL, Rfl: 0    predniSONE 10 mg tablet, Take once daily all days pills on this schedule 6- 6- 5- 4- 3- 2- 1 (Patient not taking: Reported on 1/8/2024), Disp: 27 tablet, Rfl: 0    rizatriptan (MAXALT-MLT) 10 mg disintegrating tablet, Take 1 tablet (10 mg total) by mouth once as needed for migraine for up to 30 doses may repeat in 2 hours if necessary (Patient not taking: Reported on 1/8/2024), Disp: 10 tablet, Rfl: 5    Current Allergies     Allergies as of 01/08/2024 - Reviewed 01/08/2024   Allergen Reaction Noted    Sulfa antibiotics Vomiting 06/15/2013    Latex Rash 11/29/2021            The following portions of the patient's history were reviewed and updated as appropriate: allergies, current medications, past family history, past medical history, past social history, past surgical history and problem list.     Past Medical History:   Diagnosis Date    Asthma     Bipolar disorder (HCC)     Chlamydia     COVID-19 virus infection 05/19/2021    Migraine        Past Surgical History:   Procedure Laterality Date    TYMPANOPLASTY Right     TYMPANOSTOMY TUBE PLACEMENT         Family History   Problem Relation Age of Onset    Multiple sclerosis Mother     Thyroid disease Mother     Fibromyalgia Mother     Migraines Mother     Depression Mother     Kidney cancer Maternal Grandmother      "Heart attack Maternal Grandmother     Lupus Maternal Grandmother     Skin cancer Maternal Grandmother     Heart disease Maternal Grandmother     Diabetes Maternal Grandfather     Diabetes Maternal Aunt     Polycystic ovary syndrome Maternal Aunt     Depression Maternal Aunt     Anxiety disorder Maternal Aunt     Cancer Maternal Uncle     Depression Father     Bipolar disorder Father     No Known Problems Paternal Grandmother     Cancer Paternal Grandfather     Asperger's syndrome Half-Brother     Suicidality Half-Brother     Mental illness Neg Hx     Substance Abuse Neg Hx     Breast cancer Neg Hx     Colon cancer Neg Hx     Ovarian cancer Neg Hx        Medications have been verified.    Objective     /70 (BP Location: Right arm, Patient Position: Sitting)   Pulse 68   Temp (!) 97.2 °F (36.2 °C)   Resp 18   Ht 5' 5.5\" (1.664 m)   Wt 77.6 kg (171 lb)   SpO2 99%   BMI 28.02 kg/m²   No LMP recorded.     Physical Exam     Physical Exam  Vitals and nursing note reviewed.   Constitutional:       General: She is not in acute distress.     Appearance: Normal appearance. She is normal weight. She is not ill-appearing, toxic-appearing or diaphoretic.   HENT:      Head: Normocephalic and atraumatic.      Right Ear: Tympanic membrane, ear canal and external ear normal. There is no impacted cerumen.      Left Ear: Tympanic membrane, ear canal and external ear normal. There is no impacted cerumen.      Nose: Congestion and rhinorrhea present.      Mouth/Throat:      Mouth: Mucous membranes are moist.      Pharynx: Posterior oropharyngeal erythema present. No oropharyngeal exudate.   Eyes:      General: No scleral icterus.        Right eye: Discharge present. No foreign body or hordeolum.         Left eye: Discharge present.No foreign body or hordeolum.      Conjunctiva/sclera:      Right eye: Right conjunctiva is injected. Exudate present. No chemosis or hemorrhage.     Left eye: Left conjunctiva is injected. Exudate " present. No chemosis or hemorrhage.  Cardiovascular:      Rate and Rhythm: Normal rate and regular rhythm.      Pulses: Normal pulses.      Heart sounds: Normal heart sounds. No murmur heard.     No friction rub. No gallop.   Pulmonary:      Effort: Pulmonary effort is normal. No respiratory distress.      Breath sounds: Normal breath sounds. No stridor. No wheezing, rhonchi or rales.   Chest:      Chest wall: No tenderness.   Musculoskeletal:         General: Normal range of motion.      Cervical back: Normal range of motion and neck supple. No rigidity or tenderness.   Lymphadenopathy:      Cervical: No cervical adenopathy.   Skin:     General: Skin is warm and dry.      Capillary Refill: Capillary refill takes less than 2 seconds.      Coloration: Skin is not jaundiced.      Findings: No bruising or rash.   Neurological:      General: No focal deficit present.      Mental Status: She is alert. Mental status is at baseline.   Psychiatric:         Mood and Affect: Mood normal.         Behavior: Behavior normal.

## 2024-01-10 ENCOUNTER — TELEPHONE (OUTPATIENT)
Dept: OBGYN CLINIC | Facility: MEDICAL CENTER | Age: 24
End: 2024-01-10

## 2024-01-10 NOTE — TELEPHONE ENCOUNTER
Pt calling in today stating that she went to an urgent care with a yeast infection took 1 pill of diflucon if you could please call her she has some concerns

## 2024-01-30 ENCOUNTER — TELEPHONE (OUTPATIENT)
Dept: HEMATOLOGY ONCOLOGY | Facility: CLINIC | Age: 24
End: 2024-01-30

## 2024-01-30 NOTE — TELEPHONE ENCOUNTER
Appointment Change  Cancel, Reschedule, Change to Virtual      Who are you speaking with? LEFT VM   If it is not the patient, is the caller listed on the communication consent form? N/A   Which provider is the appointment scheduled with? ANGELINE Ibarra   When was the original appointment scheduled?    Please list date and time 02/08/2024 @3:30PM    At which location is the appointment scheduled to take place? [place]   Was the appointment rescheduled?     Was the appointment changed from an in person visit to a virtual visit?    If so, please list the details of the change. Yes, 03/13/2024 @ 3:30PM with Pradeep Biswas    What is the reason for the appointment change? provider

## 2024-03-13 ENCOUNTER — CONSULT (OUTPATIENT)
Dept: HEMATOLOGY ONCOLOGY | Facility: CLINIC | Age: 24
End: 2024-03-13
Payer: COMMERCIAL

## 2024-03-13 VITALS
SYSTOLIC BLOOD PRESSURE: 118 MMHG | RESPIRATION RATE: 17 BRPM | BODY MASS INDEX: 28.02 KG/M2 | TEMPERATURE: 98.1 F | DIASTOLIC BLOOD PRESSURE: 72 MMHG | HEIGHT: 66 IN | OXYGEN SATURATION: 97 % | HEART RATE: 92 BPM

## 2024-03-13 DIAGNOSIS — R23.3 EASY BRUISING: Primary | ICD-10-CM

## 2024-03-13 DIAGNOSIS — R58 EXCESSIVE BLEEDING: ICD-10-CM

## 2024-03-13 PROCEDURE — 99244 OFF/OP CNSLTJ NEW/EST MOD 40: CPT | Performed by: PHYSICIAN ASSISTANT

## 2024-03-13 NOTE — PROGRESS NOTES
Hematology/Oncology Outpatient Consult  Devi Alarcon 23 y.o. female 2000 [id number]    Date:  3/13/2024      Assessment and Plan:  1. Excessive bleeding 2. Easy bruising  23-year-old female presents for consult regarding bruising and bleeding concerns.  PT and PTT are normal.  Her bleeding concerns include bleeding after vaginal delivery, after IUD insertion, also bruising on extremities.  Reviewed the rest of workup would include von Willebrand activity.  She also stated her mother has a platelet problem.  She is not sure what.  She will let us know if she is able to obtain this information.  Advised to complete von Willebrand activity today as she states she has an upcoming sinus surgery next week.  I reviewed we have not received any communication from that surgeon.  I do not think that there is any clearance paperwork sent to us.  She does not think so either.  If she does not hear from us within a few days after test is completed, she is advised to call the office to review.    - VWF Activity; Future    HPI:  23-year-old female presents for consultation visit regarding bruising and bleeding following birth as well as IUD insertion.    She gave birth in December 2021.  She states that she had bleeding after delivery.      There is no transfusion needed.    She had an IUD placed and this resulted in bleeding as well.    She has noticed intermittent bruising that she describes as significant of her extremities; not ever on her trunk.    Menstrual blood loss has never been heavy.  Denies history of nosebleeds.    ROS: Review of Systems   Constitutional:  Negative for appetite change, chills, fatigue, fever and unexpected weight change.   HENT:  Negative for nosebleeds.    Respiratory:  Negative for cough and shortness of breath.    Cardiovascular:  Negative for chest pain, palpitations and leg swelling.   Gastrointestinal:  Negative for abdominal pain, blood in stool, constipation, diarrhea, nausea and  vomiting.   Genitourinary:  Negative for difficulty urinating, dysuria, hematuria and menstrual problem.   Musculoskeletal:  Negative for arthralgias.   Skin: Negative.    Neurological:  Negative for dizziness, light-headedness, numbness and headaches.   Hematological: Negative.    Psychiatric/Behavioral: Negative.         Past Medical History:   Diagnosis Date    Asthma     Bipolar disorder (HCC)     Chlamydia     COVID-19 virus infection 05/19/2021    Migraine        Past Surgical History:   Procedure Laterality Date    TYMPANOPLASTY Right     TYMPANOSTOMY TUBE PLACEMENT         Social History     Socioeconomic History    Marital status: Single     Spouse name: None    Number of children: None    Years of education: None    Highest education level: None   Occupational History    None   Tobacco Use    Smoking status: Never    Smokeless tobacco: Never   Vaping Use    Vaping status: Former    Quit date: 1/1/2021    Substances: Nicotine, Flavoring   Substance and Sexual Activity    Alcohol use: Yes     Comment: occasional    Drug use: Never    Sexual activity: Yes     Partners: Male     Birth control/protection: None   Other Topics Concern    None   Social History Narrative    None     Social Determinants of Health     Financial Resource Strain: Not on file   Food Insecurity: Not on file   Transportation Needs: Not on file   Physical Activity: Not on file   Stress: Not on file   Social Connections: Not on file   Intimate Partner Violence: Not on file   Housing Stability: Not on file       Family History   Problem Relation Age of Onset    Multiple sclerosis Mother     Thyroid disease Mother     Fibromyalgia Mother     Migraines Mother     Depression Mother     Kidney cancer Maternal Grandmother     Heart attack Maternal Grandmother     Lupus Maternal Grandmother     Skin cancer Maternal Grandmother     Heart disease Maternal Grandmother     Diabetes Maternal Grandfather     Diabetes Maternal Aunt     Polycystic ovary  "syndrome Maternal Aunt     Depression Maternal Aunt     Anxiety disorder Maternal Aunt     Cancer Maternal Uncle     Depression Father     Bipolar disorder Father     No Known Problems Paternal Grandmother     Cancer Paternal Grandfather     Asperger's syndrome Half-Brother     Suicidality Half-Brother     Mental illness Neg Hx     Substance Abuse Neg Hx     Breast cancer Neg Hx     Colon cancer Neg Hx     Ovarian cancer Neg Hx        Allergies   Allergen Reactions    Sulfa Antibiotics Vomiting    Latex Rash         Current Outpatient Medications:     Multiple Vitamins-Minerals (IMMUNE SUPPORT PO), Take by mouth, Disp: , Rfl:     predniSONE 10 mg tablet, Take once daily all days pills on this schedule 6- 6- 5- 4- 3- 2- 1 (Patient not taking: Reported on 1/8/2024), Disp: 27 tablet, Rfl: 0    rizatriptan (MAXALT-MLT) 10 mg disintegrating tablet, Take 1 tablet (10 mg total) by mouth once as needed for migraine for up to 30 doses may repeat in 2 hours if necessary (Patient not taking: Reported on 1/8/2024), Disp: 10 tablet, Rfl: 5      Physical Exam:  /72 (BP Location: Left arm, Patient Position: Sitting, Cuff Size: Adult)   Pulse 92   Temp 98.1 °F (36.7 °C)   Resp 17   Ht 5' 5.5\" (1.664 m)   SpO2 97%   BMI 28.02 kg/m²     Physical Exam  Vitals reviewed.   Constitutional:       General: She is not in acute distress.     Appearance: She is well-developed. She is not ill-appearing.   HENT:      Head: Normocephalic and atraumatic.   Eyes:      General: No scleral icterus.     Conjunctiva/sclera: Conjunctivae normal.   Cardiovascular:      Rate and Rhythm: Normal rate and regular rhythm.      Heart sounds: Normal heart sounds. No murmur heard.  Pulmonary:      Effort: Pulmonary effort is normal. No respiratory distress.      Breath sounds: Normal breath sounds.   Abdominal:      Palpations: Abdomen is soft.      Tenderness: There is no abdominal tenderness.   Musculoskeletal:         General: No tenderness. " Normal range of motion.      Cervical back: Normal range of motion and neck supple.      Right lower leg: No edema.      Left lower leg: No edema.   Lymphadenopathy:      Cervical: No cervical adenopathy.   Skin:     General: Skin is warm and dry.   Neurological:      Mental Status: She is alert and oriented to person, place, and time.      Cranial Nerves: No cranial nerve deficit.   Psychiatric:         Mood and Affect: Mood normal.         Behavior: Behavior normal.       Labs:  Lab Results   Component Value Date    WBC 6.96 09/27/2023    HGB 12.4 09/27/2023    HCT 35.8 09/27/2023    MCV 87 09/27/2023     09/27/2023     I have spent 30 minutes with Patient  today in which greater than 50% of this time was spent in counseling/coordination of care regarding Diagnostic results, Risks and benefits of tx options, Instructions for management, Impressions, Counseling / Coordination of care, Documenting in the medical record, Reviewing / ordering tests, medicine, procedures  , and Obtaining or reviewing history  .    Patient voiced understanding and agreement in the above discussion. Aware to contact our office with questions/symptoms in the interim.     This note has been generated by voice recognition software system.  Therefore, there may be spelling, grammar, and or syntax errors. Please contact if questions arise.

## 2024-03-14 ENCOUNTER — APPOINTMENT (OUTPATIENT)
Age: 24
End: 2024-03-14
Payer: COMMERCIAL

## 2024-03-14 DIAGNOSIS — R23.3 EASY BRUISING: ICD-10-CM

## 2024-03-14 DIAGNOSIS — R58 EXCESSIVE BLEEDING: ICD-10-CM

## 2024-03-14 PROCEDURE — 36415 COLL VENOUS BLD VENIPUNCTURE: CPT

## 2024-03-14 PROCEDURE — 85245 CLOT FACTOR VIII VW RISTOCTN: CPT

## 2024-03-15 ENCOUNTER — TELEPHONE (OUTPATIENT)
Dept: HEMATOLOGY ONCOLOGY | Facility: CLINIC | Age: 24
End: 2024-03-15

## 2024-03-15 DIAGNOSIS — G43.809 OTHER MIGRAINE WITHOUT STATUS MIGRAINOSUS, NOT INTRACTABLE: ICD-10-CM

## 2024-03-15 LAB — VWF:RCO ACT/NOR PPP PL AGG: 69 % (ref 50–200)

## 2024-03-15 RX ORDER — RIZATRIPTAN BENZOATE 10 MG/1
10 TABLET, ORALLY DISINTEGRATING ORAL ONCE AS NEEDED
Qty: 9 TABLET | Refills: 5 | Status: SHIPPED | OUTPATIENT
Start: 2024-03-15

## 2024-03-15 NOTE — TELEPHONE ENCOUNTER
Spoke with patient informing her that her VWF profile is normal, no f/u needed within the department, patient verbalized understanding.

## 2024-03-15 NOTE — TELEPHONE ENCOUNTER
Reason for call:   [x] Refill   [] Prior Auth  [x] Other: NOT A DUPLICATE. pt picked up script but thinks she threw out the whole bottle as she can not find it.     Office:   [x] PCP/Provider -   [] Specialty/Provider -     Medication: rizatriptan (MAXALT-MLT) 10 mg disintegrating tablet     Dose/Frequency:   Take 1 tablet (10 mg total) by mouth once as needed for migraine for up to 30 doses may repeat in 2 hours if necessary        Quantity: #10    Pharmacy: [id number] - [place] - [address] [phone]     Does the patient have enough for 3 days?   [] Yes   [x] No - Send as HP to POD

## 2024-03-19 ENCOUNTER — TELEPHONE (OUTPATIENT)
Dept: HEMATOLOGY ONCOLOGY | Facility: CLINIC | Age: 24
End: 2024-03-19

## 2024-03-19 NOTE — TELEPHONE ENCOUNTER
Patient Call    Who are you speaking with? Physician Office    If it is not the patient, are they listed on an active communication consent form? N/A   What is the reason for this call? They asked for last office note with Char 3/13/24 to be faxed to [phone]   Does this require a call back? N/A   If a call back is required, please list best call back number N/a   If a call back is required, advise that a message will be forwarded to their care team and someone will return their call as soon as possible.   Did you relay this information to the patient? N/A

## 2024-03-20 ENCOUNTER — TELEPHONE (OUTPATIENT)
Dept: HEMATOLOGY ONCOLOGY | Facility: CLINIC | Age: 24
End: 2024-03-20

## 2024-03-20 NOTE — TELEPHONE ENCOUNTER
Patient Call    Who are you speaking with? Physician Office    If it is not the patient, are they listed on an active communication consent form? N/A   What is the reason for this call? They asked for last office note with Char 3/13/24 to be faxed to [phone]    Does this require a call back? N/A   If a call back is required, please list best call back number NA   If a call back is required, advise that a message will be forwarded to their care team and someone will return their call as soon as possible.   Did you relay this information to the patient? I informed her request was sent  3/19/24

## 2024-04-03 ENCOUNTER — NURSE TRIAGE (OUTPATIENT)
Dept: OBGYN CLINIC | Facility: MEDICAL CENTER | Age: 24
End: 2024-04-03

## 2024-04-10 NOTE — PROGRESS NOTES
[facility]  [address], [address], [place], [place]    Assessment/Plan:  No problem-specific Assessment & Plan notes found for this encounter.    There are no diagnoses linked to this encounter.      Subjective:   Devi Alarcon is a 24 y.o.  female.  CC: recurrent yeast infection    HPI: HPI  Patient reports recurrent yeast infections.  She states she had 1 a couple weeks ago and took Diflucan.  She states her symptoms have since resolved.  When she gets her infection she reports increased vaginal discharge and itching.  She reports some occurring every 2 to 3 months.  She does have an IUD in place and reports no issues with her IUD.  She has been started on spironolactone for acne which has been helping.  ROS: Review of Systems   Constitutional: Negative.    HENT: Negative.     Eyes: Negative.    Respiratory: Negative.     Cardiovascular: Negative.    Gastrointestinal: Negative.    Genitourinary: Negative.    Musculoskeletal: Negative.    All other systems reviewed and are negative.      PFSH: The following portions of the patient's history were reviewed and updated as appropriate: allergies, current medications, past family history, past medical history, obstetric history, gynecologic history, past social history, past surgical history and problem list.       Objective:  There were no vitals taken for this visit.   Physical Exam  Vitals reviewed.   Constitutional:       Appearance: Normal appearance.   Cardiovascular:      Rate and Rhythm: Normal rate.   Pulmonary:      Effort: Pulmonary effort is normal. No respiratory distress.   Genitourinary:     General: Normal vulva.      Exam position: Lithotomy position.      Labia:         Right: No rash or lesion.         Left: No rash or lesion.       Vagina: Normal.      Cervix: Normal.      Uterus: Normal.       Comments: IUD strings visualized  Neurological:      Mental Status: She is alert.   Psychiatric:         Mood and Affect:  Mood normal.         Behavior: Behavior normal.

## 2024-04-11 ENCOUNTER — OFFICE VISIT (OUTPATIENT)
Dept: OBGYN CLINIC | Facility: CLINIC | Age: 24
End: 2024-04-11
Payer: COMMERCIAL

## 2024-04-11 VITALS
BODY MASS INDEX: 27.16 KG/M2 | DIASTOLIC BLOOD PRESSURE: 76 MMHG | HEIGHT: 66 IN | WEIGHT: 169 LBS | SYSTOLIC BLOOD PRESSURE: 120 MMHG

## 2024-04-11 DIAGNOSIS — B37.9 CANDIDIASIS: Primary | ICD-10-CM

## 2024-04-11 PROCEDURE — 87210 SMEAR WET MOUNT SALINE/INK: CPT | Performed by: OBSTETRICS & GYNECOLOGY

## 2024-04-11 PROCEDURE — 99214 OFFICE O/P EST MOD 30 MIN: CPT | Performed by: OBSTETRICS & GYNECOLOGY

## 2024-04-11 RX ORDER — SPIRONOLACTONE 50 MG/1
50 TABLET, FILM COATED ORAL 2 TIMES DAILY
COMMUNITY
Start: 2024-04-03

## 2024-04-11 RX ORDER — FLUCONAZOLE 150 MG/1
150 TABLET ORAL DAILY
Qty: 9 TABLET | Refills: 0 | Status: SHIPPED | OUTPATIENT
Start: 2024-04-11 | End: 2024-04-20

## 2024-04-12 LAB
BV WHIFF TEST VAG QL: ABNORMAL
CLUE CELLS SPEC QL WET PREP: ABNORMAL
SL AMB POCT WET MOUNT: ABNORMAL
T VAGINALIS VAG QL WET PREP: ABNORMAL
YEAST VAG QL WET PREP: ABNORMAL

## 2024-04-27 ENCOUNTER — OFFICE VISIT (OUTPATIENT)
Dept: URGENT CARE | Age: 24
End: 2024-04-27
Payer: COMMERCIAL

## 2024-04-27 VITALS
DIASTOLIC BLOOD PRESSURE: 80 MMHG | OXYGEN SATURATION: 98 % | RESPIRATION RATE: 18 BRPM | HEART RATE: 60 BPM | SYSTOLIC BLOOD PRESSURE: 116 MMHG | TEMPERATURE: 97.1 F

## 2024-04-27 DIAGNOSIS — R35.0 FREQUENCY OF MICTURITION: Primary | ICD-10-CM

## 2024-04-27 LAB
SL AMB  POCT GLUCOSE, UA: ABNORMAL
SL AMB LEUKOCYTE ESTERASE,UA: ABNORMAL
SL AMB POCT BILIRUBIN,UA: ABNORMAL
SL AMB POCT BLOOD,UA: ABNORMAL
SL AMB POCT CLARITY,UA: CLEAR
SL AMB POCT COLOR,UA: YELLOW
SL AMB POCT KETONES,UA: ABNORMAL
SL AMB POCT NITRITE,UA: ABNORMAL
SL AMB POCT PH,UA: 5
SL AMB POCT SPECIFIC GRAVITY,UA: 1.03
SL AMB POCT URINE HCG: NEGATIVE
SL AMB POCT URINE PROTEIN: ABNORMAL
SL AMB POCT UROBILINOGEN: 0.2

## 2024-04-27 PROCEDURE — 81002 URINALYSIS NONAUTO W/O SCOPE: CPT

## 2024-04-27 PROCEDURE — 81025 URINE PREGNANCY TEST: CPT

## 2024-04-27 PROCEDURE — 99213 OFFICE O/P EST LOW 20 MIN: CPT

## 2024-04-27 PROCEDURE — 87086 URINE CULTURE/COLONY COUNT: CPT

## 2024-04-27 NOTE — PATIENT INSTRUCTIONS
Microscopic blood was seen in your urine today  We send urine for culture, we will call you if your antibiotic needs to be changed based on culture results.   If symptoms do not improve in 2-3 days, follow-up with your primary care provider.   If you develop fever, chills, or worsening low back pain report to the emergency room. These are symptoms of a more serious condition or possible spread of the infection into your bloodstream.     Increase fluids.  Urinate frequently, and more regularly than when you have the urge.    Take Tylenol ibuprofen for pain  Avoid tight clothing, wear cotton underwear.  Urinate before and after sexual intercourse  Always wipe and clean from front to back after using the toilet.

## 2024-04-27 NOTE — PROGRESS NOTES
[facility]        NAME: Devi Alarcon is a 24 y.o. female  : 2000    MRN: [id number]  DATE: 2024  TIME: 2:36 PM    Assessment and Plan   Frequency of micturition [R35.0]  1. Frequency of micturition  POCT urine HCG    Urine culture    POCT urine dip        POCT urine pregnancy: Negative  POCT urine dip:  Color:yellow  UA, Clarity:clear  Glucose: Negative  Bilirubin: Negative  Ketones: Negative  Specific gravity:1.030  Blood: Negative  pH:5.0  Protein: Negative  Urobilinogen: 0.2  Nitrates: Negative  Leukocytes: Negative    Will send urine culture.  Will contact patient if antibiotics are needed.  Pt will monitor MyChart for results in 1-2 days.. Check results in 1 to 2 days.  If there are more than 100,000 found in your urine,we will prescribed antibiotic. Follow-up with our office if any questions/concerns.         Patient Instructions       Follow up with PCP in 3-5 days.  Proceed to  ER if symptoms worsen.    If tests have been performed at [facility], our office will contact you with results if changes need to be made to the care plan discussed with you at the visit.  [facility]    Chief Complaint     Chief Complaint   Patient presents with   • Possible UTI     Patient been experiencing frequency and trouble emptying her bladder for the last 3-4 days- no history UIT in the past         History of Present Illness       Pt is a 24 year old female presenting with increased frequency of urination 4 days ago.  Started Spirolactone 2 weeks ago.  Has frequent yeast infections, concerned for UTI.  Pt denies fever or chills, no abd pain, n/v/d, no flank pain, no vaginal bleeding or discharge, no concern for STI.           Review of Systems   Review of Systems   Constitutional:  Negative for chills and fever.   Gastrointestinal:  Negative for abdominal distention, abdominal pain, anal bleeding, blood in stool, constipation, diarrhea, nausea and  vomiting.   Genitourinary:  Positive for frequency. Negative for decreased urine volume, difficulty urinating, dysuria, flank pain, hematuria, menstrual problem, pelvic pain and urgency.   Musculoskeletal:  Negative for back pain.         Current Medications       Current Outpatient Medications:   •  Multiple Vitamins-Minerals (IMMUNE SUPPORT PO), Take by mouth, Disp: , Rfl:   •  predniSONE 10 mg tablet, Take once daily all days pills on this schedule 6- 6- 5- 4- 3- 2- 1 (Patient not taking: Reported on 1/8/2024), Disp: 27 tablet, Rfl: 0  •  rizatriptan (MAXALT-MLT) 10 mg disintegrating tablet, Take 1 tablet (10 mg total) by mouth once as needed for migraine for up to 54 doses may repeat in 2 hours if necessary, Disp: 9 tablet, Rfl: 5  •  spironolactone (ALDACTONE) 50 mg tablet, Take 50 mg by mouth 2 (two) times a day, Disp: , Rfl:     Current Allergies     Allergies as of 04/27/2024 - Reviewed 04/27/2024   Allergen Reaction Noted   • Sulfa antibiotics Vomiting 06/15/2013   • Latex Rash 11/29/2021            The following portions of the patient's history were reviewed and updated as appropriate: allergies, current medications, past family history, past medical history, past social history, past surgical history and problem list.     Past Medical History:   Diagnosis Date   • Asthma    • Bipolar disorder (HCC)    • Chlamydia    • COVID-19 virus infection 05/19/2021   • Migraine        Past Surgical History:   Procedure Laterality Date   • TYMPANOPLASTY Right    • TYMPANOSTOMY TUBE PLACEMENT         Family History   Problem Relation Age of Onset   • Multiple sclerosis Mother    • Thyroid disease Mother    • Fibromyalgia Mother    • Migraines Mother    • Depression Mother    • Kidney cancer Maternal Grandmother    • Heart attack Maternal Grandmother    • Lupus Maternal Grandmother    • Skin cancer Maternal Grandmother    • Heart disease Maternal Grandmother    • Diabetes Maternal Grandfather    • Diabetes Maternal  Aunt    • Polycystic ovary syndrome Maternal Aunt    • Depression Maternal Aunt    • Anxiety disorder Maternal Aunt    • Cancer Maternal Uncle    • Depression Father    • Bipolar disorder Father    • No Known Problems Paternal Grandmother    • Cancer Paternal Grandfather    • Asperger's syndrome Half-Brother    • Suicidality Half-Brother    • Mental illness Neg Hx    • Substance Abuse Neg Hx    • Breast cancer Neg Hx    • Colon cancer Neg Hx    • Ovarian cancer Neg Hx          Medications have been verified.        Objective   /80 (BP Location: Right arm, Patient Position: Sitting, Cuff Size: Standard)   Pulse 60   Temp (!) 97.1 °F (36.2 °C)   Resp 18   LMP 03/28/2024 (Approximate)   SpO2 98%   Patient's last menstrual period was 03/28/2024 (approximate).       Physical Exam     Physical Exam  Vitals and nursing note reviewed.   Constitutional:       General: She is not in acute distress.     Appearance: Normal appearance. She is normal weight. She is not ill-appearing.   Cardiovascular:      Rate and Rhythm: Normal rate and regular rhythm.      Pulses: Normal pulses.      Heart sounds: Normal heart sounds.   Pulmonary:      Effort: Pulmonary effort is normal. No respiratory distress.      Breath sounds: Normal breath sounds. No stridor. No wheezing, rhonchi or rales.   Abdominal:      General: Abdomen is flat. Bowel sounds are normal. There is no distension.      Palpations: Abdomen is soft. There is no mass.      Tenderness: There is no abdominal tenderness. There is no right CVA tenderness, left CVA tenderness, guarding or rebound. Negative signs include Cano's sign, Rovsing's sign, McBurney's sign, psoas sign and obturator sign.      Hernia: No hernia is present.   Musculoskeletal:         General: Normal range of motion.   Lymphadenopathy:      Cervical: No cervical adenopathy.   Skin:     General: Skin is warm and dry.      Capillary Refill: Capillary refill takes less than 2 seconds.    Neurological:      General: No focal deficit present.      Mental Status: She is alert.

## 2024-04-30 LAB — BACTERIA UR CULT: NORMAL

## 2024-05-15 ENCOUNTER — NURSE TRIAGE (OUTPATIENT)
Age: 24
End: 2024-05-15

## 2024-05-15 NOTE — TELEPHONE ENCOUNTER
"Patient reports having increased vaginal discharge that is white and thick in consistency along with vaginal itching.  Patient states that she has been getting them frequently since after pregnancy.  She states she has used boric acid to try to rid the infection but she is still having symptoms.  Patient was last seen on 4/11/24.  She is requesting another refill of diflucan if possible and inquiring if she should come back in to make sure infection is gone after finishing course of diflucan.      She confirms her pharmacy is [facility] in [place].  Please advise.      Reason for Disposition   Symptoms of a vaginal yeast infection (i.e., white, thick, cottage-cheese-like, itchy, not bad smelling discharge)    Answer Assessment - Initial Assessment Questions  1. DISCHARGE: \"Describe the discharge.\" (e.g., white, yellow, green, gray, foamy, cottage cheese-like)      Vaginal discharge -white and thick   2. ODOR: \"Is there a bad odor?\"      Denies   3. ONSET: \"When did the discharge begin?\"      2-3 days ago   4. RASH: \"Is there a rash in that area?\" If Yes, ask: \"Describe it.\" (e.g., redness, blisters, sores, bumps)      Denies   5. ABDOMINAL PAIN: \"Are you having any abdominal pain?\" If Yes, ask: \"What does it feel like? \" (e.g., crampy, dull, intermittent, constant)       Denies   6. ABDOMINAL PAIN SEVERITY: If present, ask: \"How bad is it?\"  (e.g., mild, moderate, severe)   - MILD - doesn't interfere with normal activities    - MODERATE - interferes with normal activities or awakens from sleep    - SEVERE - patient doesn't want to move (R/O peritonitis)       Denies   7. CAUSE: \"What do you think is causing the discharge?\" \"Have you had the same problem before? What happened then?\"      Possible yeast infection   8. OTHER SYMPTOMS: \"Do you have any other symptoms?\" (e.g., fever, itching, vaginal bleeding, pain with urination, injury to genital area, vaginal foreign body)      Itching   9. PREGNANCY: \"Is there any chance " "you are pregnant?\" \"When was your last menstrual period?\"      N/a    Protocols used: Vaginal Discharge-ADULT-[place]    "

## 2024-05-15 NOTE — TELEPHONE ENCOUNTER
Regarding: repeat yeast infections  ----- Message from Melissa WATKINS sent at 5/15/2024  1:34 PM EDT -----  Pt called to schedule appt in regards to repeat yeast infections. Pt was seen on 4/11 by Dr. Vanessa regarding concern, however, reports what seems to be onset of a new yeast infection occurring. Attempted to schedule appt, prompted by decision tree to transfer to [facility].

## 2024-05-16 ENCOUNTER — TELEPHONE (OUTPATIENT)
Age: 24
End: 2024-05-16

## 2024-05-16 DIAGNOSIS — B37.9 CANDIDIASIS: Primary | ICD-10-CM

## 2024-05-16 RX ORDER — FLUCONAZOLE 150 MG/1
150 TABLET ORAL ONCE
Qty: 1 TABLET | Refills: 0 | Status: SHIPPED | OUTPATIENT
Start: 2024-05-16 | End: 2024-05-16

## 2024-05-16 NOTE — TELEPHONE ENCOUNTER
Reached out to [facility] pharmacist, and as per Pharmacist, medication is currently being filled.

## 2024-05-16 NOTE — TELEPHONE ENCOUNTER
Contacted the patient and notified her that the pharmacy is filling her prescription. Patient verbalized understanding and asked how the medication is taken patient given instructions: Sig: Take 1 tablet (150 mg total) by mouth once for 1 dose. No further questions at this time.

## 2024-05-16 NOTE — TELEPHONE ENCOUNTER
Pt called in and stated that diflucan was refill too soon , and asking if there is another medication she could have.

## 2024-05-29 ENCOUNTER — OFFICE VISIT (OUTPATIENT)
Dept: FAMILY MEDICINE CLINIC | Facility: CLINIC | Age: 24
End: 2024-05-29
Payer: COMMERCIAL

## 2024-05-29 VITALS
SYSTOLIC BLOOD PRESSURE: 120 MMHG | HEART RATE: 97 BPM | OXYGEN SATURATION: 99 % | WEIGHT: 172 LBS | DIASTOLIC BLOOD PRESSURE: 82 MMHG | BODY MASS INDEX: 28.66 KG/M2 | HEIGHT: 65 IN | RESPIRATION RATE: 18 BRPM

## 2024-05-29 DIAGNOSIS — J30.9 ALLERGIC RHINITIS, UNSPECIFIED SEASONALITY, UNSPECIFIED TRIGGER: ICD-10-CM

## 2024-05-29 DIAGNOSIS — G43.809 OTHER MIGRAINE WITHOUT STATUS MIGRAINOSUS, NOT INTRACTABLE: ICD-10-CM

## 2024-05-29 DIAGNOSIS — E16.2 HYPOGLYCEMIA: ICD-10-CM

## 2024-05-29 DIAGNOSIS — J45.20 MILD INTERMITTENT ASTHMA WITHOUT COMPLICATION: ICD-10-CM

## 2024-05-29 DIAGNOSIS — Z00.00 ANNUAL PHYSICAL EXAM: Primary | ICD-10-CM

## 2024-05-29 DIAGNOSIS — F31.70 BIPOLAR DISORDER IN FULL REMISSION, MOST RECENT EPISODE UNSPECIFIED TYPE (HCC): ICD-10-CM

## 2024-05-29 DIAGNOSIS — Z13.220 LIPID SCREENING: ICD-10-CM

## 2024-05-29 DIAGNOSIS — L70.0 ACNE VULGARIS: ICD-10-CM

## 2024-05-29 DIAGNOSIS — Z30.431 ENCOUNTER FOR MANAGEMENT OF INTRAUTERINE CONTRACEPTIVE DEVICE (IUD), UNSPECIFIED IUD MANAGEMENT TYPE: ICD-10-CM

## 2024-05-29 DIAGNOSIS — B37.31 VAGINAL CANDIDIASIS: ICD-10-CM

## 2024-05-29 PROBLEM — G44.229 CHRONIC TENSION-TYPE HEADACHE, NOT INTRACTABLE: Status: RESOLVED | Noted: 2020-02-26 | Resolved: 2024-05-29

## 2024-05-29 PROBLEM — F31.9 BIPOLAR DISORDER (HCC): Status: ACTIVE | Noted: 2020-01-07

## 2024-05-29 PROBLEM — Z30.9 CONTRACEPTION MANAGEMENT: Status: ACTIVE | Noted: 2024-05-29

## 2024-05-29 PROBLEM — G43.909 MIGRAINE: Status: ACTIVE | Noted: 2024-05-29

## 2024-05-29 PROBLEM — N94.6 DYSMENORRHEA: Status: RESOLVED | Noted: 2017-07-20 | Resolved: 2024-05-29

## 2024-05-29 PROCEDURE — 99214 OFFICE O/P EST MOD 30 MIN: CPT | Performed by: FAMILY MEDICINE

## 2024-05-29 PROCEDURE — 99395 PREV VISIT EST AGE 18-39: CPT | Performed by: FAMILY MEDICINE

## 2024-05-29 RX ORDER — TRETINOIN 0.025 %
CREAM (GRAM) TOPICAL
COMMUNITY
Start: 2024-05-08

## 2024-05-29 RX ORDER — BLOOD SUGAR DIAGNOSTIC
STRIP MISCELLANEOUS
Qty: 100 EACH | Refills: 3 | Status: SHIPPED | OUTPATIENT
Start: 2024-05-29

## 2024-05-29 RX ORDER — LANCETS 33 GAUGE
EACH MISCELLANEOUS
Qty: 100 EACH | Refills: 3 | Status: SHIPPED | OUTPATIENT
Start: 2024-05-29

## 2024-05-29 RX ORDER — BLOOD-GLUCOSE METER
KIT MISCELLANEOUS
Qty: 1 KIT | Refills: 0 | Status: SHIPPED | OUTPATIENT
Start: 2024-05-29

## 2024-05-29 NOTE — ASSESSMENT & PLAN NOTE
History of migraines.  By her symptoms, the patient clearly does have migraines.  Maxalt resolves the symptoms.  Has needed the Maxalt more lately with her IUD insertion.

## 2024-05-29 NOTE — ASSESSMENT & PLAN NOTE
Patient rarely uses albuterol if at all.  She did have an exacerbation in November, and went to urgent care for this.  She was on steroids for that.  No specific change at the moment.  Again, really not having any issues or problems.

## 2024-05-29 NOTE — ASSESSMENT & PLAN NOTE
Currently using Aldactone 50 mg twice daily, as well as Retin-A.  Seeing dermatology for both of these.

## 2024-05-29 NOTE — ASSESSMENT & PLAN NOTE
Patient does have a history of bipolar.  For quite a while in 2020 she had significant issues with this.  Has been to psychiatry and counseling before.  I would recommend that she start medication at this point, SSRIs, to try and help prevent migraines, as well as treat some of the depression anxiety symptoms related to bipolar.

## 2024-05-29 NOTE — ASSESSMENT & PLAN NOTE
Patient does have candidiasis.  It has been recurrent.  Seems to be more around the time of her periods.  Would recommend follow-up with her GYN as she has been doing.

## 2024-05-29 NOTE — PATIENT INSTRUCTIONS
Wellness Visit for Adults   AMBULATORY CARE:   A wellness visit  is when you see your healthcare provider to get screened for health problems. Your healthcare provider will also give you advice on how to stay healthy. Write down your questions so you remember to ask them. Ask your healthcare provider how often you should have a wellness visit.  What happens at a wellness visit:  Your healthcare provider will ask about your health, and your family history of health problems. This includes high blood pressure, heart disease, and cancer. He or she will ask if you have symptoms that concern you, if you smoke, and about your mood. You may also be asked about your intake of medicines, supplements, food, and alcohol. Any of the following may be done:  Your weight  will be checked. Your height may also be checked so your body mass index (BMI) can be calculated. Your BMI shows if you are at a healthy weight.    Your blood pressure  and heart rate will be checked. Your temperature may also be checked.    Blood and urine tests  may be done. Blood tests may be done to check your cholesterol levels. Abnormal cholesterol levels increase your risk for heart disease and stroke. You may also need a blood or urine test to check for diabetes if you are at increased risk. Urine tests may be done to look for signs of an infection or kidney disease.    A physical exam  includes checking your heartbeat and lungs with a stethoscope. Your healthcare provider may also check your skin to look for sun damage.    Screening tests  may be recommended. A screening test is done to check for diseases that may not cause symptoms. The screening tests you may need depend on your age, gender, family history, and lifestyle habits. For example, colorectal screening may be recommended if you are 50 years old or older.    Screening tests you need if you are a woman:   A Pap smear  is used to screen for cervical cancer. Pap smears are usually done every 3 to  5 years depending on your age. You may need them more often if you have had abnormal Pap smear test results in the past. Ask your healthcare provider how often you should have a Pap smear.    A mammogram  is an x-ray of your breasts to screen for breast cancer. Experts recommend mammograms every 2 years starting at age 50 years. You may need a mammogram at age 49 years or younger if you have an increased risk for breast cancer. Talk to your healthcare provider about when you should start having mammograms and how often you need them.    Vaccines you may need:   Get an influenza vaccine  every year. The influenza vaccine protects you from the flu. Several types of viruses cause the flu. The viruses change over time, so new vaccines are made each year.    Get a tetanus-diphtheria (Td) booster vaccine  every 10 years. This vaccine protects you against tetanus and diphtheria. Tetanus is a severe infection that may cause painful muscle spasms and lockjaw. Diphtheria is a severe bacterial infection that causes a thick covering in the back of your mouth and throat.    Get a human papillomavirus (HPV) vaccine  if you are female and aged 19 to 26 or male 19 to 21 and never received it. This vaccine protects you from HPV infection. HPV is the most common infection spread by sexual contact. HPV may also cause vaginal, penile, and anal cancers.    Get a pneumococcal vaccine  if you are aged 65 years or older. The pneumococcal vaccine is an injection given to protect you from pneumococcal disease. Pneumococcal disease is an infection caused by pneumococcal bacteria. The infection may cause pneumonia, meningitis, or an ear infection.    Get a shingles vaccine  if you are 60 or older, even if you have had shingles before. The shingles vaccine is an injection to protect you from the varicella-zoster virus. This is the same virus that causes chickenpox. Shingles is a painful rash that develops in people who had chickenpox or have  been exposed to the virus.    How to eat healthy:  My Plate is a model for planning healthy meals. It shows the types and amounts of foods that should go on your plate. Fruits and vegetables make up about half of your plate, and grains and protein make up the other half. A serving of dairy is included on the side of your plate. The amount of calories and serving sizes you need depends on your age, gender, weight, and height. Examples of healthy foods are listed below:  Eat a variety of vegetables  such as dark green, red, and orange vegetables. You can also include canned vegetables low in sodium (salt) and frozen vegetables without added butter or sauces.    Eat a variety of fresh fruits , canned fruit in 100% juice, frozen fruit, and dried fruit.    Include whole grains.  At least half of the grains you eat should be whole grains. Examples include whole-wheat bread, wheat pasta, brown rice, and whole-grain cereals such as oatmeal.    Eat a variety of protein foods such as seafood (fish and shellfish), lean meat, and poultry without skin (turkey and chicken). Examples of lean meats include pork leg, shoulder, or tenderloin, and beef round, sirloin, tenderloin, and extra lean ground beef. Other protein foods include eggs and egg substitutes, beans, peas, soy products, nuts, and seeds.    Choose low-fat dairy products such as skim or 1% milk or low-fat yogurt, cheese, and cottage cheese.    Limit unhealthy fats  such as butter, hard margarine, and shortening.       Exercise:  Exercise at least 30 minutes per day on most days of the week. Some examples of exercise include walking, biking, dancing, and swimming. You can also fit in more physical activity by taking the stairs instead of the elevator or parking farther away from stores. Include muscle strengthening activities 2 days each week. Regular exercise provides many health benefits. It helps you manage your weight, and decreases your risk for type 2 diabetes,  heart disease, stroke, and high blood pressure. Exercise can also help improve your mood. Ask your healthcare provider about the best exercise plan for you.       General health and safety guidelines:   Do not smoke.  Nicotine and other chemicals in cigarettes and cigars can cause lung damage. Ask your healthcare provider for information if you currently smoke and need help to quit. E-cigarettes or smokeless tobacco still contain nicotine. Talk to your healthcare provider before you use these products.    Limit alcohol.  A drink of alcohol is 12 ounces of beer, 5 ounces of wine, or 1½ ounces of liquor.    Lose weight, if needed.  Being overweight increases your risk of certain health conditions. These include heart disease, high blood pressure, type 2 diabetes, and certain types of cancer.    Protect your skin.  Do not sunbathe or use tanning beds. Use sunscreen with a SPF 15 or higher. Apply sunscreen at least 15 minutes before you go outside. Reapply sunscreen every 2 hours. Wear protective clothing, hats, and sunglasses when you are outside.    Drive safely.  Always wear your seatbelt. Make sure everyone in your car wears a seatbelt. A seatbelt can save your life if you are in an accident. Do not use your cell phone when you are driving. This could distract you and cause an accident. Pull over if you need to make a call or send a text message.    Practice safe sex.  Use latex condoms if are sexually active and have more than one partner. Your healthcare provider may recommend screening tests for sexually transmitted infections (STIs).    Wear helmets, lifejackets, and protective gear.  Always wear a helmet when you ride a bike or motorcycle, go skiing, or play sports that could cause a head injury. Wear protective equipment when you play sports. Wear a lifejacket when you are on a boat or doing water sports.    © Copyright [employer] 2023 Information is for End User's use only and may not be sold, redistributed or  otherwise used for commercial purposes.  The above information is an  only. It is not intended as medical advice for individual conditions or treatments. Talk to your doctor, nurse or pharmacist before following any medical regimen to see if it is safe and effective for you.  1. Annual physical exam  2. Mild intermittent asthma without complication  Assessment & Plan:  Patient rarely uses albuterol if at all.  She did have an exacerbation in November, and went to urgent care for this.  She was on steroids for that.  No specific change at the moment.  Again, really not having any issues or problems.  3. Other migraine without status migrainosus, not intractable  Assessment & Plan:  History of migraines.  By her symptoms, the patient clearly does have migraines.  Maxalt resolves the symptoms.  Has needed the Maxalt more lately with her IUD insertion.  4. Bipolar disorder in full remission, most recent episode unspecified type (HCC)  Assessment & Plan:  Patient does have a history of bipolar.  For quite a while in 2020 she had significant issues with this.  Has been to psychiatry and counseling before.  I would recommend that she start medication at this point, SSRIs, to try and help prevent migraines, as well as treat some of the depression anxiety symptoms related to bipolar.      Orders:  -     Comprehensive metabolic panel; Future; Expected date: 05/29/2024  -     CBC and differential; Future; Expected date: 05/29/2024  -     TSH, 3rd generation with Free T4 reflex; Future; Expected date: 05/29/2024  5. Allergic rhinitis, unspecified seasonality, unspecified trigger  6. Encounter for management of intrauterine contraceptive device (IUD), unspecified IUD management type  Assessment & Plan:  Patient does have an IUD in place.  Follows with OB/GYN.  7. Acne vulgaris  Assessment & Plan:  Currently using Aldactone 50 mg twice daily, as well as Retin-A.  Seeing dermatology for both of these.    8. Vaginal  candidiasis  Assessment & Plan:  Patient does have candidiasis.  It has been recurrent.  Seems to be more around the time of her periods.  Would recommend follow-up with her GYN as she has been doing.  Orders:  -     Comprehensive metabolic panel; Future; Expected date: 05/29/2024  9. Hypoglycemia  Assessment & Plan:  Patient reports symptoms consistent with hypoglycemia.  Recommend that she check her sugars.  That would be the only way that we can know for certain.  Would also recommend check laboratory studies.  She also mention that when she has the episode of hypoglycemia she notes tachycardia, as well as some potential lightheadedness.  This all could be related together, so at this point we will check laboratory studies, and the fingerstick blood sugar at home when it happens.  Patient should probably follow a diabetic diet.  Orders:  -     Blood Glucose Monitoring Suppl (OneTouch Verio Reflect) w/Device KIT; Check blood sugars once daily. Please substitute with appropriate alternative as covered by patient's insurance. Dx: E11.65  -     glucose blood (OneTouch Verio) test strip; Check blood sugars once daily. Please substitute with appropriate alternative as covered by patient's insurance. Dx: E11.65  -     OneTouch Delica Lancets 33G MISC; Check blood sugars once daily. Please substitute with appropriate alternative as covered by patient's insurance. Dx: E11.65  -     Comprehensive metabolic panel; Future; Expected date: 05/29/2024  10. Lipid screening  -     Lipid Panel with Direct LDL reflex; Future; Expected date: 05/29/2024      COVID 19 Instructions    Devi Alarcon was advised to limit contact with others to essential tasks such as getting food, medications, and medical care.    Proper handwashing reviewed, and Hand sanitzer when washing is not available.    If the patient develops symptoms of COVID 19, the patient should call the office as soon as possible.    It is strongly recommended that Flu  Vaccinations be obtained.      Virtual Visits:  Emily: This works on smart phones (any phone with Internet browsing capability).  You should get a text message when the provider is ready to see you.  Click on the link in the text message, and the call should start.  You will need to type in your name, and allow camera and microphone access.  This is HIPPA compliant, and secure.      If you have not already done so, get immunized to COVID 19.      We are committed to getting you vaccinated as soon as possible and will be closely following CDC and [facility] guidelines as they are released and revised.  Please refer to our COVID-19 vaccine webpage for the most up to date information on the vaccine and our distribution efforts.    This site will also have the most up to date recommendations for COVID booster vaccine.    [email or web address]    Call 0-992-PVIJIAA ([phone]), option 7    You can also visit https://www.vaccines.gov/ to find vaccines in your area.    OUR LOCATION:    [facility]  [address] [place] [address], [address]  [place], [place], [address] [phone]  Fax: [phone]    Lab services, Rheumatology, and OB/GYN are at this location as well.

## 2024-05-29 NOTE — ASSESSMENT & PLAN NOTE
Patient reports symptoms consistent with hypoglycemia.  Recommend that she check her sugars.  That would be the only way that we can know for certain.  Would also recommend check laboratory studies.  She also mention that when she has the episode of hypoglycemia she notes tachycardia, as well as some potential lightheadedness.  This all could be related together, so at this point we will check laboratory studies, and the fingerstick blood sugar at home when it happens.  Patient should probably follow a diabetic diet.

## 2024-05-29 NOTE — PROGRESS NOTES
Adult Annual Physical  Name: Devi Alarcon      : 2000      MRN: [id number]  Encounter Provider: Andreas Hardy MD  Encounter Date: 2024   Encounter department: [facility]    Assessment & Plan   1. Annual physical exam  2. Mild intermittent asthma without complication  Assessment & Plan:  Patient rarely uses albuterol if at all.  She did have an exacerbation in November, and went to urgent care for this.  She was on steroids for that.  No specific change at the moment.  Again, really not having any issues or problems.  3. Other migraine without status migrainosus, not intractable  Assessment & Plan:  History of migraines.  By her symptoms, the patient clearly does have migraines.  Maxalt resolves the symptoms.  Has needed the Maxalt more lately with her IUD insertion.  4. Bipolar disorder in full remission, most recent episode unspecified type (HCC)  Assessment & Plan:  Patient does have a history of bipolar.  For quite a while in  she had significant issues with this.  Has been to psychiatry and counseling before.  I would recommend that she start medication at this point, SSRIs, to try and help prevent migraines, as well as treat some of the depression anxiety symptoms related to bipolar.      Orders:  -     Comprehensive metabolic panel; Future; Expected date: 2024  -     CBC and differential; Future; Expected date: 2024  -     TSH, 3rd generation with Free T4 reflex; Future; Expected date: 2024  5. Allergic rhinitis, unspecified seasonality, unspecified trigger  6. Encounter for management of intrauterine contraceptive device (IUD), unspecified IUD management type  Assessment & Plan:  Patient does have an IUD in place.  Follows with OB/GYN.  7. Acne vulgaris  Assessment & Plan:  Currently using Aldactone 50 mg twice daily, as well as Retin-A.  Seeing dermatology for both of these.    8. Vaginal candidiasis  Assessment & Plan:  Patient does have  candidiasis.  It has been recurrent.  Seems to be more around the time of her periods.  Would recommend follow-up with her GYN as she has been doing.  Orders:  -     Comprehensive metabolic panel; Future; Expected date: 05/29/2024  9. Hypoglycemia  Assessment & Plan:  Patient reports symptoms consistent with hypoglycemia.  Recommend that she check her sugars.  That would be the only way that we can know for certain.  Would also recommend check laboratory studies.  She also mention that when she has the episode of hypoglycemia she notes tachycardia, as well as some potential lightheadedness.  This all could be related together, so at this point we will check laboratory studies, and the fingerstick blood sugar at home when it happens.  Patient should probably follow a diabetic diet.  Orders:  -     Blood Glucose Monitoring Suppl (OneTouch Verio Reflect) w/Device KIT; Check blood sugars once daily. Please substitute with appropriate alternative as covered by patient's insurance. Dx: E11.65  -     glucose blood (OneTouch Verio) test strip; Check blood sugars once daily. Please substitute with appropriate alternative as covered by patient's insurance. Dx: E11.65  -     OneTouch Delica Lancets 33G MISC; Check blood sugars once daily. Please substitute with appropriate alternative as covered by patient's insurance. Dx: E11.65  -     Comprehensive metabolic panel; Future; Expected date: 05/29/2024  10. Lipid screening  -     Lipid Panel with Direct LDL reflex; Future; Expected date: 05/29/2024    Immunizations and preventive care screenings were discussed with patient today. Appropriate education was printed on patient's after visit summary.    Counseling:  Alcohol/drug use: discussed moderation in alcohol intake, the recommendations for healthy alcohol use, and avoidance of illicit drug use.  Dental Health: discussed importance of regular tooth brushing, flossing, and dental visits.  Injury prevention: discussed  safety/seat belts, safety helmets, smoke detectors, carbon dioxide detectors, and smoking near bedding or upholstery.  Sexual health: discussed sexually transmitted diseases, partner selection, use of condoms, avoidance of unintended pregnancy, and contraceptive alternatives.  Exercise: the importance of regular exercise/physical activity was discussed. Recommend exercise 3-5 times per week for at least 30 minutes.          History of Present Illness   Patient does have a lesion on the left shoulder.  Small area.  Has been present for 2 to 3 weeks.  Does have follow-up with dermatology, as she already does see dermatology, but that will be coming up in the near future.  No other particular concerns with that, other than she wanted to make sure that it was not something significant.    Migraines: Please see discussion today.  She does have migraines, has noted a slight increase since using IUD.  Has been using Maxalt, which does resolve the symptoms, but does need to use the Maxalt.  Has not used all of the prescription every month for the last several months, but again is still having symptoms at times.    Bipolar: Patient did have a significant amount of issues with this previously, including significant problem in 2020.  She did need to follow-up with psychiatry previously.  Given that she moved, it was getting very difficult for her to see the counseling and a psychiatrist previously.  She was also doing better at that point.  Has been off medication at this time.    Acne: Following with dermatology.  Currently using Retin-A, Aldactone.    Rhinitis: Noted before, but not really having significant problems.    Frequent yeast infections: Following with OB/GYN.  Has been on fluconazole.    Patient was also concerned that she has tachycardia at times.  No recent labs or testing.  She does get lightheaded and jittery with this.    She also mention that she has hypoglycemia, more specifically feels like she has low  "sugar.            Adult Annual Physical:  Patient presents for annual physical.     Diet and Physical Activity:  - Diet/Nutrition: poor diet.  - Exercise: no formal exercise.    Depression Screening:  - PHQ-2 Score: 0    General Health:  - Sleep: daytime hypersomnolence.  - Hearing: normal hearing right ear.  - Vision: wears glasses.  - Dental: no dental visits for > 1 year.    /GYN Health:  - Follows with GYN: yes.   - Menopause: premenopausal.   - Contraception: IUD placement.      Advanced Care Planning:  - Has an advanced directive?: no    - Has a durable medical POA?: no    - ACP document given to patient?: no      Review of Systems   Constitutional: Negative.    HENT: Negative.     Gastrointestinal: Negative.    Genitourinary:  Positive for vaginal discharge.   Musculoskeletal: Negative.    Skin:  Positive for rash.   Neurological:  Positive for headaches.   Hematological: Negative.    Psychiatric/Behavioral:  Positive for agitation. Negative for suicidal ideas. The patient is nervous/anxious. The patient is not hyperactive.          Objective     /82 (BP Location: Left arm, Patient Position: Sitting, Cuff Size: Large)   Pulse 97   Resp 18   Ht 5' 5\" (1.651 m)   Wt 78 kg (172 lb)   SpO2 99%   BMI 28.62 kg/m²     Physical Exam  Vitals and nursing note reviewed.   Constitutional:       General: She is not in acute distress.     Appearance: She is well-developed. She is not diaphoretic.   HENT:      Head: Normocephalic and atraumatic.      Right Ear: Hearing, tympanic membrane, ear canal and external ear normal.      Left Ear: Hearing, tympanic membrane, ear canal and external ear normal.      Nose: Nose normal.      Right Sinus: No maxillary sinus tenderness or frontal sinus tenderness.      Left Sinus: No maxillary sinus tenderness or frontal sinus tenderness.      Mouth/Throat:      Pharynx: Uvula midline. No oropharyngeal exudate.   Eyes:      General: Lids are everted, no foreign bodies " appreciated.      Conjunctiva/sclera: Conjunctivae normal.      Pupils: Pupils are equal, round, and reactive to light.   Neck:      Thyroid: No thyromegaly.      Vascular: No carotid bruit.      Trachea: No tracheal deviation.   Cardiovascular:      Rate and Rhythm: Normal rate and regular rhythm.      Pulses:           Carotid pulses are 2+ on the right side and 2+ on the left side.     Heart sounds: Normal heart sounds. No murmur heard.     No friction rub. No gallop.   Pulmonary:      Effort: Pulmonary effort is normal. No respiratory distress.      Breath sounds: Normal breath sounds. No wheezing or rales.   Abdominal:      General: Bowel sounds are normal. There is no distension.      Palpations: Abdomen is soft.      Tenderness: There is no abdominal tenderness.   Musculoskeletal:      Cervical back: Normal range of motion and neck supple.   Lymphadenopathy:      Cervical: No cervical adenopathy.   Skin:     General: Skin is warm and dry.   Neurological:      Mental Status: She is alert and oriented to person, place, and time.      Coordination: Coordination normal.   Psychiatric:         Behavior: Behavior normal.         Thought Content: Thought content normal.         Judgment: Judgment normal.       Administrative Statements

## 2024-05-30 ENCOUNTER — APPOINTMENT (OUTPATIENT)
Age: 24
End: 2024-05-30
Payer: COMMERCIAL

## 2024-05-30 DIAGNOSIS — E16.2 HYPOGLYCEMIA: ICD-10-CM

## 2024-05-30 DIAGNOSIS — Z13.220 LIPID SCREENING: ICD-10-CM

## 2024-05-30 DIAGNOSIS — F31.70 BIPOLAR DISORDER IN FULL REMISSION, MOST RECENT EPISODE UNSPECIFIED TYPE (HCC): ICD-10-CM

## 2024-05-30 DIAGNOSIS — B37.31 VAGINAL CANDIDIASIS: ICD-10-CM

## 2024-05-30 LAB
ALBUMIN SERPL BCP-MCNC: 4.5 G/DL (ref 3.5–5)
ALP SERPL-CCNC: 63 U/L (ref 34–104)
ALT SERPL W P-5'-P-CCNC: 10 U/L (ref 7–52)
ANION GAP SERPL CALCULATED.3IONS-SCNC: 7 MMOL/L (ref 4–13)
AST SERPL W P-5'-P-CCNC: 13 U/L (ref 13–39)
BASOPHILS # BLD AUTO: 0.04 THOUSANDS/ÂΜL (ref 0–0.1)
BASOPHILS NFR BLD AUTO: 1 % (ref 0–1)
BILIRUB SERPL-MCNC: 0.42 MG/DL (ref 0.2–1)
BUN SERPL-MCNC: 18 MG/DL (ref 5–25)
CALCIUM SERPL-MCNC: 9.1 MG/DL (ref 8.4–10.2)
CHLORIDE SERPL-SCNC: 104 MMOL/L (ref 96–108)
CHOLEST SERPL-MCNC: 167 MG/DL
CO2 SERPL-SCNC: 28 MMOL/L (ref 21–32)
CREAT SERPL-MCNC: 0.69 MG/DL (ref 0.6–1.3)
EOSINOPHIL # BLD AUTO: 0.11 THOUSAND/ÂΜL (ref 0–0.61)
EOSINOPHIL NFR BLD AUTO: 2 % (ref 0–6)
ERYTHROCYTE [DISTWIDTH] IN BLOOD BY AUTOMATED COUNT: 12.7 % (ref 11.6–15.1)
GFR SERPL CREATININE-BSD FRML MDRD: 123 ML/MIN/1.73SQ M
GLUCOSE P FAST SERPL-MCNC: 101 MG/DL (ref 65–99)
HCT VFR BLD AUTO: 36.6 % (ref 34.8–46.1)
HDLC SERPL-MCNC: 52 MG/DL
HGB BLD-MCNC: 12.3 G/DL (ref 11.5–15.4)
IMM GRANULOCYTES # BLD AUTO: 0.01 THOUSAND/UL (ref 0–0.2)
IMM GRANULOCYTES NFR BLD AUTO: 0 % (ref 0–2)
LDLC SERPL CALC-MCNC: 99 MG/DL (ref 0–100)
LYMPHOCYTES # BLD AUTO: 2.23 THOUSANDS/ÂΜL (ref 0.6–4.47)
LYMPHOCYTES NFR BLD AUTO: 34 % (ref 14–44)
MCH RBC QN AUTO: 30.2 PG (ref 26.8–34.3)
MCHC RBC AUTO-ENTMCNC: 33.6 G/DL (ref 31.4–37.4)
MCV RBC AUTO: 90 FL (ref 82–98)
MONOCYTES # BLD AUTO: 0.53 THOUSAND/ÂΜL (ref 0.17–1.22)
MONOCYTES NFR BLD AUTO: 8 % (ref 4–12)
NEUTROPHILS # BLD AUTO: 3.7 THOUSANDS/ÂΜL (ref 1.85–7.62)
NEUTS SEG NFR BLD AUTO: 55 % (ref 43–75)
NRBC BLD AUTO-RTO: 0 /100 WBCS
PLATELET # BLD AUTO: 268 THOUSANDS/UL (ref 149–390)
PMV BLD AUTO: 8.6 FL (ref 8.9–12.7)
POTASSIUM SERPL-SCNC: 4.3 MMOL/L (ref 3.5–5.3)
PROT SERPL-MCNC: 7.3 G/DL (ref 6.4–8.4)
RBC # BLD AUTO: 4.07 MILLION/UL (ref 3.81–5.12)
SODIUM SERPL-SCNC: 139 MMOL/L (ref 135–147)
TRIGL SERPL-MCNC: 80 MG/DL
TSH SERPL DL<=0.05 MIU/L-ACNC: 4.13 UIU/ML (ref 0.45–4.5)
WBC # BLD AUTO: 6.62 THOUSAND/UL (ref 4.31–10.16)

## 2024-05-30 PROCEDURE — 80053 COMPREHEN METABOLIC PANEL: CPT

## 2024-05-30 PROCEDURE — 36415 COLL VENOUS BLD VENIPUNCTURE: CPT

## 2024-05-30 PROCEDURE — 80061 LIPID PANEL: CPT

## 2024-05-30 PROCEDURE — 84443 ASSAY THYROID STIM HORMONE: CPT

## 2024-05-30 PROCEDURE — 85025 COMPLETE CBC W/AUTO DIFF WBC: CPT

## 2024-05-31 ENCOUNTER — TELEPHONE (OUTPATIENT)
Age: 24
End: 2024-05-31

## 2024-05-31 NOTE — TELEPHONE ENCOUNTER
Pt called in to check on her lab results. Did check on the chart the results are not reviewed yet by the doctor. Once reviewed kindly call her back with the results. Thanks

## 2024-06-27 ENCOUNTER — OFFICE VISIT (OUTPATIENT)
Dept: FAMILY MEDICINE CLINIC | Facility: CLINIC | Age: 24
End: 2024-06-27
Payer: COMMERCIAL

## 2024-06-27 VITALS — HEART RATE: 72 BPM | DIASTOLIC BLOOD PRESSURE: 80 MMHG | TEMPERATURE: 97.6 F | SYSTOLIC BLOOD PRESSURE: 110 MMHG

## 2024-06-27 DIAGNOSIS — Z13.220 LIPID SCREENING: ICD-10-CM

## 2024-06-27 DIAGNOSIS — E16.2 HYPOGLYCEMIA: ICD-10-CM

## 2024-06-27 DIAGNOSIS — B37.31 VAGINAL CANDIDIASIS: ICD-10-CM

## 2024-06-27 DIAGNOSIS — F31.70 BIPOLAR DISORDER IN FULL REMISSION, MOST RECENT EPISODE UNSPECIFIED TYPE (HCC): Primary | ICD-10-CM

## 2024-06-27 DIAGNOSIS — G43.809 OTHER MIGRAINE WITHOUT STATUS MIGRAINOSUS, NOT INTRACTABLE: ICD-10-CM

## 2024-06-27 DIAGNOSIS — F41.1 GENERALIZED ANXIETY DISORDER: ICD-10-CM

## 2024-06-27 PROCEDURE — 99214 OFFICE O/P EST MOD 30 MIN: CPT | Performed by: FAMILY MEDICINE

## 2024-06-27 RX ORDER — ESCITALOPRAM OXALATE 10 MG/1
10 TABLET ORAL DAILY
Qty: 30 TABLET | Refills: 5 | Status: SHIPPED | OUTPATIENT
Start: 2024-06-27 | End: 2024-12-24

## 2024-06-27 NOTE — ASSESSMENT & PLAN NOTE
Continues to have some symptoms at times.  Has used Maxalt as needed.  Will see if there is any improvement using SSRI.  Of note, the patient does have migraine aura.  Labs appear to be normal.

## 2024-06-27 NOTE — PROGRESS NOTES
Ambulatory Visit  Name: Devi Alarcon      : 2000      MRN: [id number]  Encounter Provider: Andreas Hardy MD  Encounter Date: 2024   Encounter department: [facility]    Assessment & Plan   1. Bipolar disorder in full remission, most recent episode unspecified type (HCC)  Assessment & Plan:  Stable.  Not on medications.  Not really having symptoms of bipolar.  Could follow in the future if needed.  2. Hypoglycemia  Assessment & Plan:  She has had concerns about hypoglycemia for quite a while now, but her fasting blood sugar showed slightly elevated blood sugar, and when she checked when she was having symptoms, her sugar was 157.    3. Vaginal candidiasis  4. Generalized anxiety disorder  Assessment & Plan:  Has had some increased anxiety symptoms.  No specific issue for the moment with this.  She did notice increasing symptoms lately.  Could consider using SSRIs.  Will trial Lexapro, 10 mg.  Could cut back to 5 or go up to 20 depending on symptoms.  Orders:  -     escitalopram (LEXAPRO) 10 mg tablet; Take 1 tablet (10 mg total) by mouth daily  5. Other migraine without status migrainosus, not intractable  Assessment & Plan:  Continues to have some symptoms at times.  Has used Maxalt as needed.  Will see if there is any improvement using SSRI.  Of note, the patient does have migraine aura.  Labs appear to be normal.  6. Lipid screening  Comments:  Cholesterol was reasonable.  I would recommend that she try to limit fried foods, fatty foods, beef, pork.  Can check in 1 year.       History of Present Illness     Patient is here to follow-up on multiple issues.    She did have some laboratory studies done.  Reviewed.          Review of Systems   Constitutional: Negative.    HENT: Negative.     Respiratory: Negative.     Skin: Negative.    Neurological:  Positive for headaches.       Objective     /80 (BP Location: Right arm, Patient Position: Sitting, Cuff Size:  Standard)   Pulse 72   Temp 97.6 °F (36.4 °C) (Temporal)     TSH 4.134.  Total cholesterol 167, LDL 99, HDL 52, triglycerides 80.  White count 6.62, hemoglobin 12.3, hematocrit 36.6, platelets 268.  Sodium 139, potassium 4.3, calcium 9.1.  Blood sugar 101.  Creatinine 0.69, .  AST 13, ALT 10.    Physical Exam  Vitals and nursing note reviewed.   Constitutional:       Appearance: Normal appearance. She is well-developed.   HENT:      Head: Normocephalic and atraumatic.   Cardiovascular:      Rate and Rhythm: Normal rate and regular rhythm.      Pulses:           Carotid pulses are 2+ on the right side and 2+ on the left side.     Heart sounds: Normal heart sounds.   Pulmonary:      Effort: Pulmonary effort is normal.      Breath sounds: Normal breath sounds. No wheezing or rales.   Chest:      Chest wall: No tenderness.   Neurological:      Mental Status: She is alert.       Administrative Statements

## 2024-06-27 NOTE — ASSESSMENT & PLAN NOTE
She has had concerns about hypoglycemia for quite a while now, but her fasting blood sugar showed slightly elevated blood sugar, and when she checked when she was having symptoms, her sugar was 157.

## 2024-06-27 NOTE — ASSESSMENT & PLAN NOTE
Has had some increased anxiety symptoms.  No specific issue for the moment with this.  She did notice increasing symptoms lately.  Could consider using SSRIs.  Will trial Lexapro, 10 mg.  Could cut back to 5 or go up to 20 depending on symptoms.

## 2024-06-27 NOTE — ASSESSMENT & PLAN NOTE
Stable.  Not on medications.  Not really having symptoms of bipolar.  Could follow in the future if needed.

## 2024-06-27 NOTE — PATIENT INSTRUCTIONS
1. Bipolar disorder in full remission, most recent episode unspecified type (HCC)  Assessment & Plan:  Stable.  Not on medications.  Not really having symptoms of bipolar.  Could follow in the future if needed.  2. Hypoglycemia  Assessment & Plan:  She has had concerns about hypoglycemia for quite a while now, but her fasting blood sugar showed slightly elevated blood sugar, and when she checked when she was having symptoms, her sugar was 157.    3. Vaginal candidiasis  4. Generalized anxiety disorder  Assessment & Plan:  Has had some increased anxiety symptoms.  No specific issue for the moment with this.  She did notice increasing symptoms lately.  Could consider using SSRIs.  Will trial Lexapro, 10 mg.  Could cut back to 5 or go up to 20 depending on symptoms.  Orders:  -     escitalopram (LEXAPRO) 10 mg tablet; Take 1 tablet (10 mg total) by mouth daily  5. Other migraine without status migrainosus, not intractable  Assessment & Plan:  Continues to have some symptoms at times.  Has used Maxalt as needed.  Will see if there is any improvement using SSRI.  Of note, the patient does have migraine aura.  Labs appear to be normal.  6. Lipid screening  Comments:  Cholesterol was reasonable.  I would recommend that she try to limit fried foods, fatty foods, beef, pork.  Can check in 1 year.      COVID 19 Instructions    Devi Alarcon was advised to limit contact with others to essential tasks such as getting food, medications, and medical care.    Proper handwashing reviewed, and Hand sanitzer when washing is not available.    If the patient develops symptoms of COVID 19, the patient should call the office as soon as possible.    It is strongly recommended that Flu Vaccinations be obtained.      Virtual Visits:  Emily: This works on smart phones (any phone with Internet browsing capability).  You should get a text message when the provider is ready to see you.  Click on the link in the text message, and the call  should start.  You will need to type in your name, and allow camera and microphone access.  This is HIPPA compliant, and secure.      If you have not already done so, get immunized to COVID 19.      We are committed to getting you vaccinated as soon as possible and will be closely following CDC and [facility] guidelines as they are released and revised.  Please refer to our COVID-19 vaccine webpage for the most up to date information on the vaccine and our distribution efforts.    This site will also have the most up to date recommendations for COVID booster vaccine.    https://www.[facility].org/covid-19/protect-yourself/covid-19-vaccine    Call 5-157-[phone] ([phone]), option 7    You can also visit https://www.vaccines.gov/ to find vaccines in your area.    OUR LOCATION:    [facility]  [address] [place] [address], [address]  [place], [address] [phone]  Fax: [phone]    Lab services, Rheumatology, and OB/GYN are at this location as well.

## 2024-07-02 ENCOUNTER — OFFICE VISIT (OUTPATIENT)
Age: 24
End: 2024-07-02
Payer: COMMERCIAL

## 2024-07-02 VITALS
HEIGHT: 66 IN | HEART RATE: 65 BPM | TEMPERATURE: 98 F | OXYGEN SATURATION: 99 % | WEIGHT: 176.37 LBS | DIASTOLIC BLOOD PRESSURE: 68 MMHG | SYSTOLIC BLOOD PRESSURE: 110 MMHG | RESPIRATION RATE: 16 BRPM | BODY MASS INDEX: 28.34 KG/M2

## 2024-07-02 DIAGNOSIS — T36.95XA ANTIBIOTIC-INDUCED YEAST INFECTION: ICD-10-CM

## 2024-07-02 DIAGNOSIS — J01.90 ACUTE BACTERIAL SINUSITIS: Primary | ICD-10-CM

## 2024-07-02 DIAGNOSIS — B96.89 ACUTE BACTERIAL SINUSITIS: Primary | ICD-10-CM

## 2024-07-02 DIAGNOSIS — B37.9 ANTIBIOTIC-INDUCED YEAST INFECTION: ICD-10-CM

## 2024-07-02 LAB — S PYO AG THROAT QL: NEGATIVE

## 2024-07-02 PROCEDURE — 99213 OFFICE O/P EST LOW 20 MIN: CPT

## 2024-07-02 PROCEDURE — 87880 STREP A ASSAY W/OPTIC: CPT

## 2024-07-02 RX ORDER — AMOXICILLIN 875 MG/1
875 TABLET, COATED ORAL 2 TIMES DAILY
Qty: 14 TABLET | Refills: 0 | Status: SHIPPED | OUTPATIENT
Start: 2024-07-02 | End: 2024-07-09

## 2024-07-02 RX ORDER — FLUCONAZOLE 150 MG/1
150 TABLET ORAL ONCE
Qty: 1 TABLET | Refills: 0 | Status: SHIPPED | OUTPATIENT
Start: 2024-07-02 | End: 2024-07-02

## 2024-07-02 NOTE — PATIENT INSTRUCTIONS
Take antibiotics as prescribed.  Continue taking Sudafed to help decongest.    Follow up with Primary Care Provider in 3-5 days if not improving.  Proceed to Emergency Department if symptoms worsen.    If tests have been performed at [facility], our office will contact you with results if changes need to be made to the care plan discussed with you at the visit.  You can review your full results on [facility] MyChart.

## 2024-07-02 NOTE — PROGRESS NOTES
[facility]        NAME: Devi Alarcon is a 24 y.o. female  : 2000    MRN: [id number]  DATE: 2024  TIME: 6:27 PM    Assessment and Plan   Acute bacterial sinusitis [J01.90, B96.89]  1. Acute bacterial sinusitis  POCT rapid ANTIGEN strepA    amoxicillin (AMOXIL) 875 mg tablet      2. Antibiotic-induced yeast infection  fluconazole (DIFLUCAN) 150 mg tablet        Rapid strep test negative.  Daughter with same symptoms but getting better. Suspect daughter had passed on bacterial infection to her.    Patient Instructions   Take antibiotics as prescribed.  Continue taking Sudafed to help decongest.    Follow up with Primary Care Provider in 3-5 days if not improving.  Proceed to Emergency Department if symptoms worsen.    If tests have been performed at [facility], our office will contact you with results if changes need to be made to the care plan discussed with you at the visit.  You can review your full results on [facility].    Chief Complaint     Chief Complaint   Patient presents with    Nasal Congestion     Generalized body aches, sinus headaches, fatigue, sore throat, runny nose x2-3 days. Taking motrin and sudafed.         History of Present Illness       Generalized body aches, sinus headaches, fatigue, sore throat, purulent drainage starting about 3 days ago.  Has been taking Sudafed and Motrin for the symptoms.  Daughter has been ill with the same symptoms starting about 10 days ago and appears to be getting better according to mom.        Review of Systems   Review of Systems   Constitutional:  Negative for chills and fever.   HENT:  Positive for congestion, postnasal drip, rhinorrhea and sore throat. Negative for ear pain.    Eyes:  Negative for pain and visual disturbance.   Respiratory:  Positive for cough. Negative for shortness of breath.    Cardiovascular:  Negative for chest pain and palpitations.   Gastrointestinal:  Negative for abdominal pain and vomiting.    Genitourinary:  Negative for dysuria and hematuria.   Musculoskeletal:  Negative for arthralgias and back pain.   Skin:  Negative for color change and rash.   Neurological:  Negative for seizures and syncope.   All other systems reviewed and are negative.        Current Medications       Current Outpatient Medications:     amoxicillin (AMOXIL) 875 mg tablet, Take 1 tablet (875 mg total) by mouth 2 (two) times a day for 7 days, Disp: 14 tablet, Rfl: 0    escitalopram (LEXAPRO) 10 mg tablet, Take 1 tablet (10 mg total) by mouth daily, Disp: 30 tablet, Rfl: 5    fluconazole (DIFLUCAN) 150 mg tablet, Take 1 tablet (150 mg total) by mouth once for 1 dose Take only if you develop a yeast infection from taking antibiotics, Disp: 1 tablet, Rfl: 0    Multiple Vitamins-Minerals (IMMUNE SUPPORT PO), Take by mouth, Disp: , Rfl:     Retin-A 0.025 % cream, APPLY TO AFFECTED AREA THREE NIGHTS A WEEK. INCREASE TO NIGHTLY AS TOLERATED, Disp: , Rfl:     rizatriptan (MAXALT-MLT) 10 mg disintegrating tablet, Take 1 tablet (10 mg total) by mouth once as needed for migraine for up to 54 doses may repeat in 2 hours if necessary, Disp: 9 tablet, Rfl: 5    spironolactone (ALDACTONE) 50 mg tablet, Take 50 mg by mouth 2 (two) times a day, Disp: , Rfl:     Blood Glucose Monitoring Suppl (OneTouch Verio Reflect) w/Device KIT, Check blood sugars once daily. Please substitute with appropriate alternative as covered by patient's insurance. Dx: E11.65, Disp: 1 kit, Rfl: 0    glucose blood (OneTouch Verio) test strip, Check blood sugars once daily. Please substitute with appropriate alternative as covered by patient's insurance. Dx: E11.65, Disp: 100 each, Rfl: 3    OneTouch Delica Lancets 33G MISC, Check blood sugars once daily. Please substitute with appropriate alternative as covered by patient's insurance. Dx: E11.65, Disp: 100 each, Rfl: 3    Current Allergies     Allergies as of 07/02/2024 - Reviewed 07/02/2024   Allergen Reaction Noted     "Sulfa antibiotics Vomiting 06/15/2013    Latex Rash 11/29/2021            The following portions of the patient's history were reviewed and updated as appropriate: allergies, current medications, past family history, past medical history, past social history, past surgical history and problem list.     Past Medical History:   Diagnosis Date    Chlamydia     COVID-19 virus infection 05/19/2021       Past Surgical History:   Procedure Laterality Date    NASAL TURBINATE REDUCTION  2024    TONSILLECTOMY  2024    TYMPANOPLASTY Right     TYMPANOSTOMY TUBE PLACEMENT         Family History   Problem Relation Age of Onset    Multiple sclerosis Mother     Thyroid disease Mother     Fibromyalgia Mother     Migraines Mother     Depression Mother     Kidney cancer Maternal Grandmother     Heart attack Maternal Grandmother     Lupus Maternal Grandmother     Skin cancer Maternal Grandmother     Heart disease Maternal Grandmother     Diabetes Maternal Grandfather     Diabetes Maternal Aunt     Polycystic ovary syndrome Maternal Aunt     Depression Maternal Aunt     Anxiety disorder Maternal Aunt     Cancer Maternal Uncle     Depression Father     Bipolar disorder Father     No Known Problems Paternal Grandmother     Cancer Paternal Grandfather     Asperger's syndrome Half-Brother     Suicidality Half-Brother     Mental illness Neg Hx     Substance Abuse Neg Hx     Breast cancer Neg Hx     Colon cancer Neg Hx     Ovarian cancer Neg Hx          Medications have been verified.        Objective   /68   Pulse 65   Temp 98 °F (36.7 °C)   Resp 16   Ht 5' 5.5\" (1.664 m)   Wt 80 kg (176 lb 5.9 oz)   LMP 06/20/2024 (Approximate)   SpO2 99%   BMI 28.90 kg/m²   Patient's last menstrual period was 06/20/2024 (approximate).       Physical Exam     Physical Exam  Vitals and nursing note reviewed.   Constitutional:       Appearance: Normal appearance.   HENT:      Head: Normocephalic and atraumatic.      Nose: Congestion and " rhinorrhea present. Rhinorrhea is purulent.      Right Sinus: No maxillary sinus tenderness or frontal sinus tenderness.      Left Sinus: No maxillary sinus tenderness or frontal sinus tenderness.   Pulmonary:      Effort: Pulmonary effort is normal.   Skin:     General: Skin is warm and dry.      Capillary Refill: Capillary refill takes less than 2 seconds.   Neurological:      General: No focal deficit present.      Mental Status: She is alert and oriented to person, place, and time. Mental status is at baseline.      Sensory: No sensory deficit.      Motor: No weakness.   Psychiatric:         Mood and Affect: Mood normal.         Behavior: Behavior normal.         Thought Content: Thought content normal.

## 2024-07-19 DIAGNOSIS — F41.1 GENERALIZED ANXIETY DISORDER: ICD-10-CM

## 2024-07-19 RX ORDER — ESCITALOPRAM OXALATE 10 MG/1
10 TABLET ORAL DAILY
Qty: 100 TABLET | Refills: 1 | Status: SHIPPED | OUTPATIENT
Start: 2024-07-19

## 2024-08-01 ENCOUNTER — OFFICE VISIT (OUTPATIENT)
Dept: FAMILY MEDICINE CLINIC | Facility: CLINIC | Age: 24
End: 2024-08-01
Payer: COMMERCIAL

## 2024-08-01 VITALS
HEIGHT: 66 IN | DIASTOLIC BLOOD PRESSURE: 70 MMHG | TEMPERATURE: 97.3 F | BODY MASS INDEX: 28.06 KG/M2 | OXYGEN SATURATION: 95 % | SYSTOLIC BLOOD PRESSURE: 110 MMHG | HEART RATE: 60 BPM | WEIGHT: 174.6 LBS

## 2024-08-01 DIAGNOSIS — G43.809 OTHER MIGRAINE WITHOUT STATUS MIGRAINOSUS, NOT INTRACTABLE: ICD-10-CM

## 2024-08-01 DIAGNOSIS — F41.1 GENERALIZED ANXIETY DISORDER: ICD-10-CM

## 2024-08-01 DIAGNOSIS — E16.2 HYPOGLYCEMIA: Primary | ICD-10-CM

## 2024-08-01 PROCEDURE — 99214 OFFICE O/P EST MOD 30 MIN: CPT | Performed by: NURSE PRACTITIONER

## 2024-08-01 RX ORDER — FLUCONAZOLE 150 MG/1
TABLET ORAL
COMMUNITY
Start: 2024-07-02

## 2024-08-01 RX ORDER — ESCITALOPRAM OXALATE 20 MG/1
20 TABLET ORAL DAILY
Qty: 30 TABLET | Refills: 2 | Status: SHIPPED | OUTPATIENT
Start: 2024-08-01

## 2024-08-01 RX ORDER — RIZATRIPTAN BENZOATE 10 MG/1
10 TABLET, ORALLY DISINTEGRATING ORAL ONCE AS NEEDED
Qty: 9 TABLET | Refills: 5 | Status: SHIPPED | OUTPATIENT
Start: 2024-08-01

## 2024-08-01 RX ORDER — AMITRIPTYLINE HYDROCHLORIDE 10 MG/1
10 TABLET, FILM COATED ORAL
Qty: 30 TABLET | Refills: 2 | Status: CANCELLED | OUTPATIENT
Start: 2024-08-01

## 2024-08-01 NOTE — ASSESSMENT & PLAN NOTE
I am unsure of the cause of the patient's frequent blood sugar fluctuations as she does report eating 3 meals regularly throughout the day and her prior lab work did not point to any signs of diabetes.  C-peptide and fasting insulin levels were ordered to assess for any signs of overproduction of insulin by her body.  Hemoglobin A1c and fasting glucose levels were also ordered.  Endocrinology referral was placed for follow-up regarding her symptoms.  Patient will also be referred to a nutritionist to hopefully develop a diet to prevent her episodes of hypoglycemia.  I also advised the patient to begin checking her blood sugars first thing in the morning prior to breakfast to see if she is experiening nocturnal hypoglycemia.

## 2024-08-01 NOTE — PATIENT INSTRUCTIONS
"Diabetes and diet   The Basics   Written by the doctors and editors at [facility]   Why is diet important if I have diabetes? -- Diet is important because it is part of diabetes treatment. Many people need to change what they eat and how much they eat to help treat their diabetes. It is important for people to treat their diabetes so they:   Keep their blood sugar at goal   Prevent long-term problems, such as heart or kidney problems, that can happen in people with diabetes  Changing your diet can also help treat obesity, high blood pressure, and high cholesterol. These conditions can affect people with diabetes and can lead to future problems, such as heart attacks or strokes.  Who will help me change my diet? -- Your doctor or nurse will work with you to make a food plan to change your diet. They might also recommend that you work with a dietitian. A dietitian is an expert on food and eating.  Do I need to eat at the same times every day? -- When and how often you should eat depends, in part, on the diabetes medicines you take. For example:   People who take about the same amount of insulin at the same time each day (called a \"fixed regimen\") should eat meals at the same times. This is also true for people who take pills that increase insulin levels, such as sulfonylureas. Eating meals at the same time every day helps prevent low blood sugar.   People who adjust the dose and timing of their insulin each day (called a \"flexible regimen\") do not always have to eat meals at the same time. That's because they can time their insulin dose for before they plan to eat, and also adjust the dose for how much they plan to eat.   People who take medicines that don't usually cause low blood sugar, such as metformin, don't have to eat meals at the same time every day.  What do I need to think about when planning what to eat? -- Our bodies break down the food we eat into small pieces called carbohydrates, proteins, and fats.  When " "planning what to eat, people with diabetes need to think about:   Carbohydrates (or \"carbs\") - These are sugars the body uses for energy. They can raise your blood sugar level. Your doctor, nurse, or dietitian will tell you how many carbs you should eat at each meal or snack. Foods that have carbs include:   Bread, pasta, and rice   Vegetables and fruits   Dairy foods   Foods and drinks with added sugar  It is best to get your carbs from fruits, vegetables, whole grains, and low-fat milk. It is best to avoid drinks with added sugar, like soda, juices, and sports drinks.   Protein - Your doctor, nurse, or dietitian will tell you how much protein you should eat each day. It is best to eat lean meats, fish, eggs, beans, peas, soy products, nuts, and seeds. Avoid or limit processed meats like cooper, hot dogs, and sausages.   Fats - The type of fat you eat is more important than the amount of fat. \"Saturated\" and \"trans\" fats can increase your risk for heart problems, like a heart attack.   Foods that have saturated fats include meat, butter, cheese, and ice cream.   Foods that have trans fats include processed food with \"partially hydrogenated oils\" on the ingredient list. This might include fried foods, store-bought cookies, muffins, pies, and cakes.  \"Monounsaturated\" and \"polyunsaturated\" fats are better for you. Foods with these types of fat include fish, avocado, olive oil, and nuts.   Calories - You need to eat a certain amount of calories each day to keep your weight the same. If you have excess body weight and want to lose weight, you need to eat fewer calories each day.   Fiber - Eating foods with a lot of fiber can help manage your blood sugar level. Foods that have a lot of fiber include apples, green beans, peas, beans, lentils, nuts, oatmeal, and whole grains.   Salt - People who have high blood pressure should not eat foods that contain a lot of salt (also called sodium). People with high blood pressure " should also eat healthy foods, such as fruits, vegetables, and low-fat dairy foods.   Alcohol and sugary drinks - Having more than 1 alcoholic drink (for females) or 2 drinks (for males) a day can raise blood sugar levels. Also, sugary drinks like fruit juice or soda can raise blood sugar levels.  How can I lose weight? -- You can:   Exercise - Try to get at least 30 minutes of physical activity a day, most days of the week. Even gentle exercise, like walking, is good for your health. Some people with diabetes need to change their medicine dose before they exercise. They might also need to check their blood sugar levels before and after exercising.   Eat fewer calories - Your doctor, nurse, or dietitian can tell you how many calories you should eat each day to lose weight.  If you are worried about your weight, size, or shape, talk with your doctor, nurse, or dietitian. They can help you make changes to improve your health.  Can I eat the same foods as my family? -- Yes. You do not need to eat special foods if you have diabetes. You and your family can eat the same foods. Changing your diet is mostly about eating healthy foods in healthy amounts.  What are the other parts of diabetes treatment? -- Besides changing your diet, the other parts of diabetes treatment are:   Exercise   Medicines  Some people with diabetes need to learn how to match their diet and exercise with their medicine dose. For example, people who use insulin might need to choose the dose of insulin they give themselves. To choose their dose, they need to think about:   What they plan to eat at the next meal   How much exercise they plan to do   What their blood sugar level is  If the diet and exercise do not match the medicine dose, a person's blood sugar level can get too low or too high. Blood sugar levels that are too low or too high can cause problems.  All topics are updated as new evidence becomes available and our peer review process is  complete.  This topic retrieved from [employer] on: Mar 27, 2024.  Topic 59273 Version 13.0  Release: 32.2.4 - C32.85  © 2024 UpToDate, Inc. and/or its affiliates. All rights reserved.  Consumer Information Use and Disclaimer   Disclaimer: This generalized information is a limited summary of diagnosis, treatment, and/or medication information. It is not meant to be comprehensive and should be used as a tool to help the user understand and/or assess potential diagnostic and treatment options. It does NOT include all information about conditions, treatments, medications, side effects, or risks that may apply to a specific patient. It is not intended to be medical advice or a substitute for the medical advice, diagnosis, or treatment of a health care provider based on the health care provider's examination and assessment of a patient's specific and unique circumstances. Patients must speak with a health care provider for complete information about their health, medical questions, and treatment options, including any risks or benefits regarding use of medications. This information does not endorse any treatments or medications as safe, effective, or approved for treating a specific patient. UpToDate, Inc. and its affiliates disclaim any warranty or liability relating to this information or the use thereof.The use of this information is governed by the Terms of Use, available at https://www.woltersZetouwer.com/en/know/clinical-effectiveness-terms. 2024© UpToDate, Inc. and its affiliates and/or licensors. All rights reserved.  Copyright   © 2024 UpToDate, Inc. and/or its affiliates. All rights reserved.

## 2024-08-01 NOTE — PROGRESS NOTES
Ambulatory Visit  Name: Devi Alarcon      : 2000      MRN: [id number]  Encounter Provider: ANGELINE Major  Encounter Date: 2024   Encounter department: [facility]    Assessment & Plan   1. Hypoglycemia  Assessment & Plan:  I am unsure of the cause of the patient's frequent blood sugar fluctuations as she does report eating 3 meals regularly throughout the day and her prior lab work did not point to any signs of diabetes.  C-peptide and fasting insulin levels were ordered to assess for any signs of overproduction of insulin by her body.  Hemoglobin A1c and fasting glucose levels were also ordered.  Endocrinology referral was placed for follow-up regarding her symptoms.  Patient will also be referred to a nutritionist to hopefully develop a diet to prevent her episodes of hypoglycemia.  I also advised the patient to begin checking her blood sugars first thing in the morning prior to breakfast to see if she is experiening nocturnal hypoglycemia.  Orders:  -     C-peptide; Future  -     Hemoglobin A1C; Future  -     Insulin, fasting; Future  -     Glucose, fasting; Future  -     Ambulatory Referral to Endocrinology; Future  -     Ambulatory Referral to Nutrition Services; Future  2. Other migraine without status migrainosus, not intractable  Assessment & Plan:  Maxalt was reordered to be used as needed.  Lexapro dosage was also increased to 20 mg daily to see if this helps with her migraines.  I did advise patient that if the increased dosage does not improve her migraine symptoms we can consider adding on another preventative medication such as Elavil.  I did also inform the patient that her blood sugar fluctuations could be contribute to her more frequent migraines.  Orders:  -     rizatriptan (MAXALT-MLT) 10 mg disintegrating tablet; Take 1 tablet (10 mg total) by mouth once as needed for migraine for up to 54 doses may repeat in 2 hours if necessary  -     escitalopram  (LEXAPRO) 20 mg tablet; Take 1 tablet (20 mg total) by mouth daily  3. Generalized anxiety disorder  Assessment & Plan:  Lexapro dosage was increased to 20 mg daily.  Patient's anxiety symptoms can be reassessed at her next office visit.  Orders:  -     escitalopram (LEXAPRO) 20 mg tablet; Take 1 tablet (20 mg total) by mouth daily     History of Present Illness     Hypoglycemia: Patient reports that over the past few months her blood sugar has been fluctuating frequently.  At times she will note hypoglycemia but other times she will have hyperglycemia.  Patient's most recent CMP did show a slightly elevated fasting glucose of 101.  The patient reports that she checks her blood sugar at different times throughout the day when she begins to feel symptomatic.  When her blood sugar is low she often reports noting flushing, palpitations, and lightheadedness.  She denies ever checking her blood sugar when first waking in the morning.  She does report that some of her episodes of hypoglycemia do occur shortly after eating a meal.    Migraines: The patient reports that she has been having more frequent migraines recently.  She is currently managed on Lexapro 10 mg daily and she reports that since beginning this medication she feels it has slightly improved her migraines.  She has been using Maxalt more often recently and she does report this does help to resolve her migraines.    VIK: Patient does report that the Lexapro has improved her anxiety since beginning the medication but she is interested in increasing the dosage of the medication to see if it has increased effects on her anxiety as well as prevention of migraines.        Review of Systems   Constitutional:  Negative for chills and fever.   HENT:  Negative for ear pain and sore throat.    Eyes:  Negative for pain and visual disturbance.   Respiratory:  Negative for cough, chest tightness, shortness of breath and wheezing.    Cardiovascular:  Positive for  "palpitations (due to hypoglycemia). Negative for chest pain and leg swelling.   Gastrointestinal:  Negative for abdominal pain, constipation, diarrhea, nausea and vomiting.   Endocrine: Negative for cold intolerance and heat intolerance.   Genitourinary:  Negative for decreased urine volume, dysuria and hematuria.   Musculoskeletal:  Negative for arthralgias, back pain and myalgias.   Skin:  Negative for color change and rash.   Allergic/Immunologic: Negative for environmental allergies.   Neurological:  Positive for light-headedness (due to hypoglycemia) and headaches (more frequent migraines). Negative for dizziness, seizures, syncope and weakness.   Hematological:  Negative for adenopathy.   Psychiatric/Behavioral:  Negative for confusion. The patient is not nervous/anxious.    All other systems reviewed and are negative.      Objective     /70 (BP Location: Right arm, Patient Position: Sitting, Cuff Size: Standard)   Pulse 60   Temp (!) 97.3 °F (36.3 °C) (Tympanic)   Ht 5' 5.5\" (1.664 m)   Wt 79.2 kg (174 lb 9.6 oz)   LMP 06/20/2024 (Approximate)   SpO2 95%   BMI 28.61 kg/m²     Physical Exam  Vitals and nursing note reviewed.   Constitutional:       General: She is not in acute distress.     Appearance: Normal appearance. She is well-developed. She is not ill-appearing.   HENT:      Head: Normocephalic.   Eyes:      Conjunctiva/sclera: Conjunctivae normal.   Cardiovascular:      Rate and Rhythm: Normal rate and regular rhythm.      Pulses: Normal pulses.           Carotid pulses are 2+ on the right side and 2+ on the left side.       Radial pulses are 2+ on the right side and 2+ on the left side.        Posterior tibial pulses are 2+ on the right side and 2+ on the left side.      Heart sounds: Normal heart sounds. No murmur heard.  Pulmonary:      Effort: Pulmonary effort is normal. No respiratory distress.      Breath sounds: Normal breath sounds. No decreased breath sounds, wheezing, rhonchi or " rales.   Abdominal:      General: Abdomen is flat. Bowel sounds are normal. There is no distension.      Palpations: Abdomen is soft.      Tenderness: There is no abdominal tenderness. There is no guarding.   Musculoskeletal:         General: No swelling. Normal range of motion.      Cervical back: Normal range of motion and neck supple.      Right lower leg: No edema.      Left lower leg: No edema.   Skin:     General: Skin is warm and dry.      Capillary Refill: Capillary refill takes less than 2 seconds.   Neurological:      General: No focal deficit present.      Mental Status: She is alert and oriented to person, place, and time.   Psychiatric:         Mood and Affect: Mood normal.         Behavior: Behavior normal.         Thought Content: Thought content normal.         Judgment: Judgment normal.       Administrative Statements

## 2024-08-01 NOTE — ASSESSMENT & PLAN NOTE
Lexapro dosage was increased to 20 mg daily.  Patient's anxiety symptoms can be reassessed at her next office visit.

## 2024-08-01 NOTE — ASSESSMENT & PLAN NOTE
Maxalt was reordered to be used as needed.  Lexapro dosage was also increased to 20 mg daily to see if this helps with her migraines.  I did advise patient that if the increased dosage does not improve her migraine symptoms we can consider adding on another preventative medication such as Elavil.  I did also inform the patient that her blood sugar fluctuations could be contribute to her more frequent migraines.

## 2024-08-05 ENCOUNTER — TELEPHONE (OUTPATIENT)
Age: 24
End: 2024-08-05

## 2024-08-05 NOTE — TELEPHONE ENCOUNTER
Patient called the office asking if her endo referral can be faxed to [facility] at fax number [phone]. Please review and advise

## 2024-08-07 ENCOUNTER — APPOINTMENT (OUTPATIENT)
Age: 24
End: 2024-08-07
Payer: COMMERCIAL

## 2024-08-07 DIAGNOSIS — E16.2 HYPOGLYCEMIA: ICD-10-CM

## 2024-08-07 LAB
EST. AVERAGE GLUCOSE BLD GHB EST-MCNC: 103 MG/DL
GLUCOSE P FAST SERPL-MCNC: 85 MG/DL (ref 65–99)
HBA1C MFR BLD: 5.2 %
INSULIN SERPL-ACNC: 9.75 UIU/ML (ref 1.9–23)

## 2024-08-07 PROCEDURE — 36415 COLL VENOUS BLD VENIPUNCTURE: CPT

## 2024-08-07 PROCEDURE — 82947 ASSAY GLUCOSE BLOOD QUANT: CPT

## 2024-08-07 PROCEDURE — 84681 ASSAY OF C-PEPTIDE: CPT

## 2024-08-07 PROCEDURE — 83036 HEMOGLOBIN GLYCOSYLATED A1C: CPT

## 2024-08-07 PROCEDURE — 83525 ASSAY OF INSULIN: CPT

## 2024-08-08 LAB — C PEPTIDE SERPL-MCNC: 2.5 NG/ML (ref 1.1–4.4)

## 2024-08-19 ENCOUNTER — NURSE TRIAGE (OUTPATIENT)
Age: 24
End: 2024-08-19

## 2024-08-19 ENCOUNTER — TELEPHONE (OUTPATIENT)
Age: 24
End: 2024-08-19

## 2024-08-19 DIAGNOSIS — Z30.013 ENCOUNTER FOR INITIAL PRESCRIPTION OF INJECTABLE CONTRACEPTIVE: Primary | ICD-10-CM

## 2024-08-19 DIAGNOSIS — B37.31 VAGINAL CANDIDIASIS: Primary | ICD-10-CM

## 2024-08-19 RX ORDER — FLUCONAZOLE 150 MG/1
150 TABLET ORAL DAILY
Qty: 1 TABLET | Refills: 0 | Status: SHIPPED | OUTPATIENT
Start: 2024-08-19 | End: 2024-08-20

## 2024-08-19 RX ORDER — MEDROXYPROGESTERONE ACETATE 150 MG/ML
150 INJECTION, SUSPENSION INTRAMUSCULAR
Qty: 1 ML | Refills: 0 | Status: SHIPPED | OUTPATIENT
Start: 2024-08-19

## 2024-08-19 NOTE — TELEPHONE ENCOUNTER
Regarding: possible Persian : discomfort, itching, discharge  ----- Message from Norma WOODARD sent at 8/19/2024  1:54 PM EDT -----  Patient called reporting recurrent Persian sxs including discomfort, itching & discharge. Attempted warm transfer

## 2024-08-19 NOTE — TELEPHONE ENCOUNTER
Patient called requesting to schedule IUD removal at next available, scheduled 9/9. Pt states they gave the IUD an adjustment timeframe but wants to proceed with removal. Pt wishes to revert to depo provera injection & inquired if it can be given same day as removal or if there is a waiting period. Please reach out to pt.

## 2024-08-19 NOTE — TELEPHONE ENCOUNTER
Patient recently had lexapro dosage increased and said that gets car sick every time she gets in the car. Told her that nausea is a side effect and it should go away as she gets use to the new dosage. Can try OTC meclizine or dramamine but should be careful because they can cause drowsiness. Told her to call back if the car sickness becomes an ongoing issue.

## 2024-08-19 NOTE — TELEPHONE ENCOUNTER
LVM making patient aware, as per Hattie, she can have the Depo injection the same day as her IUD removal. Please remember to  the Depo from your pharmacy and bring it with you on 9/9/24.

## 2024-08-19 NOTE — TELEPHONE ENCOUNTER
Regarding: possible Uzbek : discomfort, itching, discharge  ----- Message from Melissa WATKINS sent at 8/19/2024  4:40 PM EDT -----  Pt returned call in regard to yeast infection symptoms consisting of vaginal itching/discharge. Attempted warm transfer, all CTS currently busy. Please provide a call back. Thank you.

## 2024-08-19 NOTE — TELEPHONE ENCOUNTER
"RN placed a call to patient to follow up on symptoms. Per patient, has 4/10 itching and irritation in vagina, as well as white, thick vaginal discharge. Pt is prone to yeast infections, and also has been out in the heat, and went swimming this past weekend. RN advised will send diflucan to pharmacy on file. Pt aware to call back if symptoms do not clear up or improve within 2-4 days. Can also try boric acid or OTC monistat in conjunction with PO diflucan, as she is prone to yeast (seen in the past by provider for management of symptoms). Pt verbalized an understanding. No further questions. Order sent per protocol.     Answer Assessment - Initial Assessment Questions  1. SYMPTOM: \"What's the main symptom you're concerned about?\" (e.g., pain, itching, dryness)      Itching, discomfort, thick white discharge, no odor  2. LOCATION: \"Where is the  s/s located?\" (e.g., inside/outside, left/right)      Inside  3. ONSET: \"When did the  s/s  start?\"      4 days ago  4. PAIN: \"Is there any pain?\" If Yes, ask: \"How bad is it?\" (Scale: 1-10; mild, moderate, severe)      Denies  5. ITCHING: \"Is there any itching?\" If Yes, ask: \"How bad is it?\" (Scale: 1-10; mild, moderate, severe)      4/10  6. CAUSE: \"What do you think is causing the discharge?\" \"Have you had the same problem before? What happened then?\"      Yeast  7. OTHER SYMPTOMS: \"Do you have any other symptoms?\" (e.g., fever, itching, vaginal bleeding, pain with urination, injury to genital area, vaginal foreign body)      Denies  8. PREGNANCY: \"Is there any chance you are pregnant?\" \"When was your last menstrual period?\"      Denies    Protocols used: Vaginal Symptoms-ADULT-[place]    \"How many yeast infections have you had in the past 2 years?\"    -If 1 or less, prescribe Diflucan 150mg PO. If no improvement after 1 dose treatment, please instruct patient to call back to schedule appointment. (should be seen within 3 days)    -If more than 4 or more yeast infections in a " year or if they are recurrent, schedule appointment within 3 days.    For OB patients: if patient wishes to use over the counter monistat, recommend only the 7 day treatment.

## 2024-08-23 DIAGNOSIS — G43.809 OTHER MIGRAINE WITHOUT STATUS MIGRAINOSUS, NOT INTRACTABLE: ICD-10-CM

## 2024-08-23 DIAGNOSIS — F41.1 GENERALIZED ANXIETY DISORDER: ICD-10-CM

## 2024-08-23 RX ORDER — ESCITALOPRAM OXALATE 20 MG/1
20 TABLET ORAL DAILY
Qty: 90 TABLET | Refills: 1 | Status: SHIPPED | OUTPATIENT
Start: 2024-08-23

## 2024-09-09 ENCOUNTER — PROCEDURE VISIT (OUTPATIENT)
Dept: OBGYN CLINIC | Facility: MEDICAL CENTER | Age: 24
End: 2024-09-09
Payer: COMMERCIAL

## 2024-09-09 VITALS
BODY MASS INDEX: 30.06 KG/M2 | DIASTOLIC BLOOD PRESSURE: 80 MMHG | WEIGHT: 180.4 LBS | HEIGHT: 65 IN | SYSTOLIC BLOOD PRESSURE: 124 MMHG

## 2024-09-09 DIAGNOSIS — Z30.432 ENCOUNTER FOR IUD REMOVAL: Primary | ICD-10-CM

## 2024-09-09 DIAGNOSIS — Z30.013 ENCOUNTER FOR INITIAL PRESCRIPTION OF INJECTABLE CONTRACEPTIVE: ICD-10-CM

## 2024-09-09 PROBLEM — Z30.9 CONTRACEPTION MANAGEMENT: Status: RESOLVED | Noted: 2024-05-29 | Resolved: 2024-09-09

## 2024-09-09 PROBLEM — B37.31 VAGINAL CANDIDIASIS: Status: RESOLVED | Noted: 2024-05-29 | Resolved: 2024-09-09

## 2024-09-09 LAB — SL AMB POCT URINE HCG: NORMAL

## 2024-09-09 PROCEDURE — 96372 THER/PROPH/DIAG INJ SC/IM: CPT | Performed by: NURSE PRACTITIONER

## 2024-09-09 PROCEDURE — 58301 REMOVE INTRAUTERINE DEVICE: CPT | Performed by: NURSE PRACTITIONER

## 2024-09-09 PROCEDURE — 81025 URINE PREGNANCY TEST: CPT | Performed by: NURSE PRACTITIONER

## 2024-09-09 RX ORDER — CLINDAMYCIN PHOSPHATE 10 UG/ML
LOTION TOPICAL
COMMUNITY
Start: 2024-08-16

## 2024-09-09 RX ORDER — MEDROXYPROGESTERONE ACETATE 150 MG/ML
150 INJECTION, SUSPENSION INTRAMUSCULAR
Qty: 1 ML | Refills: 3 | Status: SHIPPED | OUTPATIENT
Start: 2024-09-09

## 2024-09-09 RX ORDER — MEDROXYPROGESTERONE ACETATE 150 MG/ML
150 INJECTION, SUSPENSION INTRAMUSCULAR
Status: SHIPPED | OUTPATIENT
Start: 2024-09-09

## 2024-09-09 RX ADMIN — MEDROXYPROGESTERONE ACETATE 150 MG: 150 INJECTION, SUSPENSION INTRAMUSCULAR at 13:37

## 2024-09-09 NOTE — PROGRESS NOTES
Patient presents for Depo-Provera injection.  Patient supplied medication.  Patient tolerated IM injection well.  Denied questions or concerns at conclusion of conversation.  Aware repeat injection is due in 3 months.  Encouraged to schedule prior to leaving office at today's visit.  UPT in office today neg   Lot # [id number] NDC [id number] Expiration date 11/2026

## 2024-09-09 NOTE — PROGRESS NOTES
IUD Procedure    Date/Time: 9/9/2024 1:20 PM    Performed by: ANGELINE Sue  Authorized by: ANGELINE Sue    Other Assisting Provider: Yes (comment)    Verbal consent obtained?: Yes    Written consent obtained?: Yes    Risks and benefits: Risks, benefits and alternatives were discussed    Consent given by:  Patient  Time Out:     Time out: Immediately prior to the procedure a time out was called      Time out performed at:  9/9/2024 1:15 PM  Patient states understanding of procedure being performed: Yes    Patient's understanding of procedure matches consent: Yes    Procedure consent matches procedure scheduled: Yes    Relevant documents present and verified: Yes    Test results available and properly labeled: Yes    Site marked: No    Radiology Images displayed and confirmed. If images not available, report reviewed: No    Required items: Required blood products, implants, devices and special equipment available    Patient identity confirmed:  Verbally with patient  Select procedure: IUD removal    IUD Removal:     Reason for removal: patient request      Removed without complications: yes      Tenaculum/Allis/Ring Forceps applied to cervix: no      Strings visualized: yes      IUD intact: yes      Performed with ultrasound guidance: no    Removal Comments:      G1, P1 here for IUD removal.  Is not satisfied with contraception due to side effects specifically weight gain, acne.  Is interested in returning to medroxyprogesterone.  Aware medroxyprogesterone injections are given every effective for 3 months.  Common side effects reviewed.  Reviewed removal of IUD.  Patient with prior pregnancy while on Depo-Provera.  Is not interested in any other contraception at this time.  IUD removed without difficulty, intact.  Patient tolerated well  Depo-Provera today.  UPT negative in office  RTO 3 months for depo

## 2024-09-10 ENCOUNTER — PATIENT MESSAGE (OUTPATIENT)
Dept: FAMILY MEDICINE CLINIC | Facility: CLINIC | Age: 24
End: 2024-09-10

## 2024-09-15 NOTE — DISCHARGE INSTRUCTIONS
Pregnancy at 23 to 22 [facility] 38:   Now that you are in your second trimester, you have more energy  You may also be feeling hungrier than usual  You may be gaining about ½ to 1 pound a week, and your pregnancy is beginning to show  You may need to start wearing maternity clothes  As your baby gets larger, you may have other symptoms  These may include body aches or stretch marks on your abdomen, breasts, thighs, or buttocks  DISCHARGE INSTRUCTIONS:   Return to the emergency department if:   · You develop a severe headache that does not go away  · You have new or increased vision changes, such as blurred or spotted vision  · You have new or increased swelling in your face or hands  · You have vaginal spotting or bleeding  · Your water broke or you feel warm water gushing or trickling from your vagina  Contact your healthcare provider if:   · You have abdominal cramps, pressure, or tightening  · You have a change in vaginal discharge  · You cannot keep food or drinks down, and you are losing weight  · You have chills or a fever  · You have vaginal itching, burning, or pain  · You have yellow, green, white, or foul-smelling vaginal discharge  · You have pain or burning when you urinate, less urine than usual, or pink or bloody urine  · You have questions or concerns about your condition or care  How to care for yourself at this stage of your pregnancy:   · Eat a variety of healthy foods  Healthy foods include fruits, vegetables, whole-grain breads, low-fat dairy foods, beans, lean meats, and fish  Drink liquids as directed  Ask how much liquid to drink each day and which liquids are best for you  Limit caffeine to less than 200 milligrams each day  Limit your intake of fish to 2 servings each week  Choose fish low in mercury such as canned light tuna, shrimp, salmon, cod, or tilapia   Do not  eat fish high in mercury such as swordfish, tilefish, eliu mackerel, and shark  · Take prenatal vitamins as directed  Your need for certain vitamins and minerals, such as folic acid, increases during pregnancy  Prenatal vitamins provide some of the extra vitamins and minerals you need  Prenatal vitamins may also help to decrease the risk of certain birth defects  · Talk to your healthcare provider about exercise  Moderate exercise can help you stay fit  Your healthcare provider will help you plan an exercise program that is safe for you during pregnancy  · Do not smoke  Smoking increases your risk of a miscarriage and other health problems during your pregnancy  Smoking can cause your baby to be born too early or weigh less at birth  Ask your healthcare provider for information if you need help quitting  · Do not drink alcohol  Alcohol passes from your body to your baby through the placenta  It can affect your baby's brain development and cause fetal alcohol syndrome (FAS)  FAS is a group of conditions that causes mental, behavior, and growth problems  · Talk to your healthcare provider before you take any medicines  Many medicines may harm your baby if you take them when you are pregnant  Do not take any medicines, vitamins, herbs, or supplements without first talking to your healthcare provider  Never use illegal or street drugs (such as marijuana or cocaine) while you are pregnant  Safety tips during pregnancy:   · Avoid hot tubs and saunas  Do not use a hot tub or sauna while you are pregnant, especially during your first trimester  Hot tubs and saunas may raise your baby's temperature and increase the risk of birth defects  · Avoid toxoplasmosis  This is an infection caused by eating raw meat or being around infected cat feces  It can cause birth defects, miscarriages, and other problems  Wash your hands after you touch raw meat  Make sure any meat is well-cooked before you eat it  Avoid raw eggs and unpasteurized milk   Use gloves or ask someone else to clean your cat's litter box while you are pregnant  Changes that are happening with your baby:  By 22 weeks, your baby is about 8 inches long from the top of the head to the rump (baby's bottom)  Your baby also weighs about 1 pound  Your baby is becoming much more active  You may be able to feel the baby move inside you now  The first movements may not be that noticeable  They may feel like a fluttering sensation  As time goes on, your baby's movements will become stronger and more noticeable  What you need to know about prenatal care:  During the first 28 weeks of your pregnancy, you will see your healthcare provider once a month  Your healthcare provider will check your blood pressure and weight  You may also need the following:  · A urine test  may also be done to check for sugar and protein  These can be signs of gestational diabetes or infection  Protein in your urine may also be a sign of preeclampsia  Preeclampsia is a condition that can develop during week 20 or later of your pregnancy  It causes high blood pressure, and it can cause problems with your kidneys and other organs  · Fundal height  is a measurement of your uterus to check your baby's growth  This number is usually the same as the number of weeks that you have been pregnant  · A fetal ultrasound  shows pictures of your baby inside your uterus  It shows your baby's development  The movement and position of your baby can also be seen  Your healthcare provider may be able to tell you what your baby's gender is during the ultrasound  · Your baby's heart rate  will be checked  © Copyright [employer] 2021 Information is for End User's use only and may not be sold, redistributed or otherwise used for commercial purposes  All illustrations and images included in CareNotes® are the copyrighted property of A D A M , Inc  or [facility] [employer]anes   The above information is an  only   It is not intended as medical advice for individual conditions or treatments  Talk to your doctor, nurse or pharmacist before following any medical regimen to see if it is safe and effective for you  No

## 2024-09-16 ENCOUNTER — NURSE TRIAGE (OUTPATIENT)
Age: 24
End: 2024-09-16

## 2024-09-16 DIAGNOSIS — B37.31 VAGINAL YEAST INFECTION: Primary | ICD-10-CM

## 2024-09-16 RX ORDER — FLUCONAZOLE 150 MG/1
TABLET ORAL
Refills: 0 | Status: CANCELLED | OUTPATIENT
Start: 2024-09-16

## 2024-09-16 NOTE — TELEPHONE ENCOUNTER
Regarding: yeast infection symptoms  ----- Message from Melissa WATKINS sent at 9/16/2024  2:10 PM EDT -----  Pt called reporting yeast infection like symptoms consisting of vaginal sensitivity and itching, thick white vaginal discharge. Reports recently having her IUD removed on 9/9. She is a pt of [facility]

## 2024-09-16 NOTE — TELEPHONE ENCOUNTER
Outgoing call to patient. Informed of providers response and patient verbalized understanding. Will try 7 day.

## 2024-09-16 NOTE — TELEPHONE ENCOUNTER
Called patient to reviewed recommendations per Hattie. Patient states she gets burning and worsening itching with Monistat and is wondering if she can try something else or if there is a way to prevent that reaction. Message to Hattie for review.

## 2024-09-16 NOTE — TELEPHONE ENCOUNTER
"Patient is calling in, she reports having symptoms of a yeast infection. She had her IUD removed on 9/9, she was getting frequent infections prior to the IUD removal. She states the symptoms started around 9/7 and were very mild. She is now having thick white discharge, itching and irritation. Back in April she was given an extended course of Diflucan, she has also taken two tablets, or just one. She has also tried Boric Acid in the past. What is your recommendation for treatment at this time?    I did discuss cotton underwear, changing out of wet clothes, dove unscented bar soap, her and her partner shower prior to intercourse and after. She can also take a probiotic daily    Answer Assessment - Initial Assessment Questions  1. DISCHARGE: \"Describe the discharge.\" (e.g., white, yellow, green, gray, foamy, cottage cheese-like)      Thick white discharge  2. ODOR: \"Is there a bad odor?\"      Denies   3. ONSET: \"When did the discharge begin?\"      IUD removed 9/9- mild symptoms prior to removal   4. RASH: \"Is there a rash in that area?\" If Yes, ask: \"Describe it.\" (e.g., redness, blisters, sores, bumps)      denies  5. ABDOMINAL PAIN: \"Are you having any abdominal pain?\" If Yes, ask: \"What does it feel like? \" (e.g., crampy, dull, intermittent, constant)       Denies   6. ABDOMINAL PAIN SEVERITY: If present, ask: \"How bad is it?\"  (e.g., mild, moderate, severe)   - MILD - doesn't interfere with normal activities    - MODERATE - interferes with normal activities or awakens from sleep    - SEVERE - patient doesn't want to move (R/O peritonitis)       Feels uncomfortable   7. CAUSE: \"What do you think is causing the discharge?\" \"Have you had the same problem before? What happened then?\"      Getting frequent yeast infections   8. OTHER SYMPTOMS: \"Do you have any other symptoms?\" (e.g., fever, itching, vaginal bleeding, pain with urination, injury to genital area, vaginal foreign body)      Itching and " irritation    Protocols used: Vaginal Discharge-ADULT-[place]

## 2024-09-19 ENCOUNTER — OFFICE VISIT (OUTPATIENT)
Dept: FAMILY MEDICINE CLINIC | Facility: CLINIC | Age: 24
End: 2024-09-19
Payer: COMMERCIAL

## 2024-09-19 ENCOUNTER — OFFICE VISIT (OUTPATIENT)
Age: 24
End: 2024-09-19
Payer: COMMERCIAL

## 2024-09-19 VITALS
OXYGEN SATURATION: 92 % | HEIGHT: 65 IN | SYSTOLIC BLOOD PRESSURE: 124 MMHG | BODY MASS INDEX: 29.89 KG/M2 | HEART RATE: 74 BPM | WEIGHT: 179.4 LBS | DIASTOLIC BLOOD PRESSURE: 62 MMHG

## 2024-09-19 VITALS
RESPIRATION RATE: 18 BRPM | TEMPERATURE: 97.2 F | WEIGHT: 180 LBS | OXYGEN SATURATION: 100 % | HEIGHT: 65 IN | BODY MASS INDEX: 29.99 KG/M2 | HEART RATE: 107 BPM | DIASTOLIC BLOOD PRESSURE: 76 MMHG | SYSTOLIC BLOOD PRESSURE: 118 MMHG

## 2024-09-19 DIAGNOSIS — B01.9 VARICELLA WITHOUT COMPLICATION: Primary | ICD-10-CM

## 2024-09-19 DIAGNOSIS — T78.40XA ALLERGIC REACTION, INITIAL ENCOUNTER: Primary | ICD-10-CM

## 2024-09-19 PROCEDURE — 99213 OFFICE O/P EST LOW 20 MIN: CPT

## 2024-09-19 PROCEDURE — 99214 OFFICE O/P EST MOD 30 MIN: CPT | Performed by: NURSE PRACTITIONER

## 2024-09-19 RX ORDER — FAMOTIDINE 20 MG/1
20 TABLET, FILM COATED ORAL 2 TIMES DAILY
Qty: 14 TABLET | Refills: 0 | Status: SHIPPED | OUTPATIENT
Start: 2024-09-19 | End: 2024-09-26

## 2024-09-19 RX ORDER — PREDNISONE 10 MG/1
TABLET ORAL
Qty: 30 TABLET | Refills: 0 | Status: SHIPPED | OUTPATIENT
Start: 2024-09-19

## 2024-09-19 RX ORDER — TRETINOIN 0.5 MG/G
CREAM TOPICAL
COMMUNITY
Start: 2024-09-16

## 2024-09-19 RX ORDER — VALACYCLOVIR HYDROCHLORIDE 1 G/1
1000 TABLET, FILM COATED ORAL 3 TIMES DAILY
Qty: 21 TABLET | Refills: 0 | Status: SHIPPED | OUTPATIENT
Start: 2024-09-19 | End: 2024-09-19

## 2024-09-19 NOTE — LETTER
September 19, 2024     Patient: Devi Alarcon   YOB: 2000   Date of Visit: 9/19/2024       To Whom it May Concern:    Devi Alarcon was seen in my clinic on 9/19/2024. She may return to work on Monday 9/23/2024 if her symptoms are improving .    If you have any questions or concerns, please don't hesitate to call.         Sincerely,          ANGELINE Tadeo        CC: No Recipients

## 2024-09-19 NOTE — LETTER
September 19, 2024     Patient: Devi Alarcon  YOB: 2000  Date of Visit: 9/19/2024      To Whom it May Concern:    Devi Alarcon is under my professional care. Devi was seen in my office on 9/19/2024. Devi is excused for leaving work early on 9/19/2024 and she is excused from work all day on 9/20/2024 as she is being treated for an allergic reaction.  She may return to work on 9/23/2024 with no restrictions.    If you have any questions or concerns, please don't hesitate to call.         Sincerely,          ANGELINE Major        CC: No Recipients

## 2024-09-19 NOTE — PROGRESS NOTES
Ambulatory Visit  Name: Devi Alarcon      : 2000      MRN: [id number]  Encounter Provider: ANGELINE Major  Encounter Date: 2024   Encounter department: [facility]    Assessment & Plan  Allergic reaction, initial encounter  I feel the patient's symptoms are consistent with an allergic reaction not varicella.  Prednisone taper was ordered for treatment of her symptoms.  She was also prescribed Pepcid 20 mg twice daily for the histamine response.  She was also advised to begin taking Claritin 10 mg in the morning and Benadryl 25 mg at bedtime.  I did advise her that if she plans to continue to give herself tattoos she should only complete small areas at a time to see if this is what she is reacting to.  Orders:    predniSONE 10 mg tablet; Use 5 tablets for 2 days. Use 4 tablets for 2 days. Use 3 tablets for 2 days. Use 2 tablets for 2 days. Use 1 tablet for 2 days.    famotidine (PEPCID) 20 mg tablet; Take 1 tablet (20 mg total) by mouth 2 (two) times a day for 7 days       History of Present Illness     Rash: Patient was seen at urgent care earlier today and was diagnosed with varicella.  Per the urgent care note the patient does complete her own tattoos and she did recently give herself a new tattoo prior to the rash first being noted.  The rash was noted to be present bilateral upper extremities, bilateral lower extremities, chest, and back.  She also has associated pruritus.  It was noted that the patient reports being vaccinated for varicella as a child and her immunizations do show that she did have 3 doses of the varicella vaccine.  The patient was prescribed a 7-day course of Valtrex for treatment at urgent care.          Review of Systems   Constitutional:  Negative for chills and fever.   HENT:  Negative for ear pain and sore throat.    Eyes:  Negative for pain and visual disturbance.   Respiratory:  Negative for cough and shortness of breath.    Cardiovascular:   "Negative for chest pain, palpitations and leg swelling.   Gastrointestinal:  Negative for abdominal pain, constipation, diarrhea, nausea and vomiting.   Endocrine: Negative for cold intolerance and heat intolerance.   Genitourinary:  Negative for decreased urine volume, dysuria and hematuria.   Musculoskeletal:  Negative for arthralgias, back pain and myalgias.   Skin:  Positive for rash (generalized). Negative for color change.   Allergic/Immunologic: Negative for environmental allergies.   Neurological:  Negative for dizziness, seizures, syncope, weakness, light-headedness, numbness and headaches.   Hematological:  Negative for adenopathy.   Psychiatric/Behavioral:  Negative for confusion. The patient is not nervous/anxious.    All other systems reviewed and are negative.          Objective     /62 (BP Location: Right arm, Patient Position: Sitting, Cuff Size: Standard)   Pulse 74   Ht 5' 5\" (1.651 m)   Wt 81.4 kg (179 lb 6.4 oz)   LMP 09/18/2024 (Exact Date)   SpO2 92%   BMI 29.85 kg/m²     Physical Exam  Vitals and nursing note reviewed.   Constitutional:       General: She is not in acute distress.     Appearance: Normal appearance. She is well-developed. She is not ill-appearing.   HENT:      Head: Normocephalic.   Eyes:      Conjunctiva/sclera: Conjunctivae normal.   Cardiovascular:      Rate and Rhythm: Normal rate and regular rhythm.      Pulses: Normal pulses.           Carotid pulses are 2+ on the right side and 2+ on the left side.       Radial pulses are 2+ on the right side and 2+ on the left side.        Posterior tibial pulses are 2+ on the right side and 2+ on the left side.      Heart sounds: Normal heart sounds. No murmur heard.  Pulmonary:      Effort: Pulmonary effort is normal. No respiratory distress.      Breath sounds: Normal breath sounds. No decreased breath sounds, wheezing, rhonchi or rales.   Abdominal:      General: Abdomen is flat. Bowel sounds are normal. There is no " distension.      Palpations: Abdomen is soft.      Tenderness: There is no abdominal tenderness. There is no guarding.   Musculoskeletal:         General: No swelling. Normal range of motion.      Cervical back: Normal range of motion and neck supple.      Right lower leg: No edema.      Left lower leg: No edema.   Skin:     General: Skin is warm and dry.      Capillary Refill: Capillary refill takes less than 2 seconds.      Findings: Rash present. Rash is macular, papular and urticarial.      Comments: Generalized rash noted on the bilateral upper extremities, bilateral lower extremities, chest, abdomen, and back.  The rash did have a combination of macules, papules, and urticaria.  Dermatographia was present consistent with urticaria as well.   Neurological:      General: No focal deficit present.      Mental Status: She is alert and oriented to person, place, and time.   Psychiatric:         Mood and Affect: Mood normal.         Behavior: Behavior normal.         Thought Content: Thought content normal.         Judgment: Judgment normal.

## 2024-09-19 NOTE — PROGRESS NOTES
[facility]        NAME: Devi Alarcon is a 24 y.o. female  : 2000    MRN: [id number]  DATE: 2024  TIME: 2:48 PM    Assessment and Plan   Varicella without complication [B01.9]  1. Varicella without complication  valACYclovir (VALTREX) 1,000 mg tablet            Patient Instructions   Your rash appears to be consistent with Chickenpox.  Antiviral medications sent  to the pharmacy - take as directed.  Take Benadryl as needed for itching  Tylenol as needed for pain/fever    Follow up with Primary Care Provider for further work up of possible autoimmune issues that has been diagnosed and for possible immunization titers with your PCP.    Proceed to Emergency Department if symptoms worsen.    If tests have been performed at [facility], our office will contact you with results if changes need to be made to the care plan discussed with you at the visit.  You can review your full results on [facility].    Chief Complaint     Chief Complaint   Patient presents with    Rash     Rash started this morning. Fever yesterday. First noticed itching on left wrist. Does do her own tattoos at home with her own tattoo gun, nothing new with her products. Rash is is entire body and around mouth. Used hydrocortisone cream.          History of Present Illness       24-year-old female presents with a new onset rash, extremely itchy, all over her body including palms of her hands around her mouth, trunk chest and bilateral arms and legs.  Patient states there are no new exposures, she has a new tattoo around which raised red areas formed at the same time the generalized rash formed, but now even her existing tattoos are raised red and itchy regardless of age.  Patient states earlier this week she had a fever and a sore throat for a few days but that did resolve.  Patient states she believes that she was vaccinated for chickenpox as a child.  Patient has no sick contacts patient has small child who is  well at home.  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    Rash  This is a new problem. The current episode started yesterday. The problem has been rapidly worsening since onset. The rash is diffuse. The problem is moderate. The rash is characterized by itchiness. She was exposed to nothing. The rash first occurred at home. Pertinent negatives include no congestion, cough, fever, shortness of breath, sore throat or vomiting. Her past medical history is significant for allergies and asthma. There were no sick contacts.       Review of Systems   Review of Systems   Constitutional:  Negative for chills and fever.   HENT:  Negative for congestion, ear pain and sore throat.    Eyes:  Negative for pain and visual disturbance.   Respiratory:  Negative for cough and shortness of breath.    Cardiovascular:  Negative for chest pain and palpitations.   Gastrointestinal:  Negative for abdominal pain and vomiting.   Genitourinary:  Negative for dysuria and hematuria.   Musculoskeletal:  Negative for arthralgias and back pain.   Skin:  Positive for rash. Negative for color change.   Neurological:  Negative for seizures and syncope.   All other systems reviewed and are negative.        Current Medications       Current Outpatient Medications:     buPROPion (WELLBUTRIN XL) 150 mg 24 hr tablet, Take 1 tablet (150 mg total) by mouth every morning, Disp: 30 tablet, Rfl: 5    clindamycin (CLEOCIN T) 1 % lotion, APPLY TO RASH ONCE DAILY AS NEEDED FOR FLARES., Disp: , Rfl:     medroxyPROGESTERone (DEPO-PROVERA) 150 mg/mL injection, Inject 1 mL (150 mg total) into a muscle every 3 (three) months, Disp: 1 mL, Rfl: 3    Multiple Vitamins-Minerals (IMMUNE SUPPORT PO), Take by mouth, Disp: , Rfl:     Retin-A 0.025 % cream, APPLY TO AFFECTED AREA THREE NIGHTS A WEEK. INCREASE TO NIGHTLY AS TOLERATED, Disp: , Rfl:     rizatriptan (MAXALT-MLT) 10 mg disintegrating tablet, Take 1 tablet (10 mg total) by mouth once as needed for migraine for up to 54  doses may repeat in 2 hours if necessary, Disp: 9 tablet, Rfl: 5    spironolactone (ALDACTONE) 50 mg tablet, Take 50 mg by mouth 2 (two) times a day, Disp: , Rfl:     valACYclovir (VALTREX) 1,000 mg tablet, Take 1 tablet (1,000 mg total) by mouth 3 (three) times a day for 7 days, Disp: 21 tablet, Rfl: 0    Blood Glucose Monitoring Suppl (OneTouch Verio Reflect) w/Device KIT, Check blood sugars once daily. Please substitute with appropriate alternative as covered by patient's insurance. Dx: E11.65, Disp: 1 kit, Rfl: 0    fluconazole (DIFLUCAN) 150 mg tablet, PLEASE SEE ATTACHED FOR DETAILED DIRECTIONS (Patient not taking: Reported on 9/19/2024), Disp: , Rfl:     glucose blood (OneTouch Verio) test strip, Check blood sugars once daily. Please substitute with appropriate alternative as covered by patient's insurance. Dx: E11.65, Disp: 100 each, Rfl: 3    miconazole (MONISTAT 7) 100 mg vaginal suppository, Insert 1 suppository (100 mg total) into the vagina daily at bedtime, Disp: 7 suppository, Rfl: 0    OneTouch Delica Lancets 33G MISC, Check blood sugars once daily. Please substitute with appropriate alternative as covered by patient's insurance. Dx: E11.65, Disp: 100 each, Rfl: 3    Current Facility-Administered Medications:     medroxyPROGESTERone (DEPO-PROVERA) IM injection 150 mg, 150 mg, Intramuscular, Q3 Months, , 150 mg at 09/09/24 1337    Current Allergies     Allergies as of 09/19/2024 - Reviewed 09/19/2024   Allergen Reaction Noted    Lexapro [escitalopram] Nausea Only and Vomiting 09/10/2024    Sulfa antibiotics Vomiting 06/15/2013    Latex Rash 11/29/2021            The following portions of the patient's history were reviewed and updated as appropriate: allergies, current medications, past family history, past medical history, past social history, past surgical history and problem list.     Past Medical History:   Diagnosis Date    Chlamydia     COVID-19 virus infection 05/19/2021       Past Surgical  "History:   Procedure Laterality Date    NASAL TURBINATE REDUCTION  2024    TONSILLECTOMY  2024    TYMPANOPLASTY Right     TYMPANOSTOMY TUBE PLACEMENT         Family History   Problem Relation Age of Onset    Multiple sclerosis Mother     Thyroid disease Mother     Fibromyalgia Mother     Migraines Mother     Depression Mother     Kidney cancer Maternal Grandmother     Heart attack Maternal Grandmother     Lupus Maternal Grandmother     Skin cancer Maternal Grandmother     Heart disease Maternal Grandmother     Diabetes Maternal Grandfather     Diabetes Maternal Aunt     Polycystic ovary syndrome Maternal Aunt     Depression Maternal Aunt     Anxiety disorder Maternal Aunt     Cancer Maternal Uncle     Depression Father     Bipolar disorder Father     No Known Problems Paternal Grandmother     Cancer Paternal Grandfather     Asperger's syndrome Half-Brother     Suicidality Half-Brother     Mental illness Neg Hx     Substance Abuse Neg Hx     Breast cancer Neg Hx     Colon cancer Neg Hx     Ovarian cancer Neg Hx          Medications have been verified.        Objective   /76   Pulse (!) 107   Temp (!) 97.2 °F (36.2 °C)   Resp 18   Ht 5' 5\" (1.651 m)   Wt 81.6 kg (180 lb)   LMP 09/18/2024 (Exact Date)   SpO2 100%   BMI 29.95 kg/m²   Patient's last menstrual period was 09/18/2024 (exact date).       Physical Exam     Physical Exam  Vitals and nursing note reviewed.   Constitutional:       Appearance: Normal appearance.   HENT:      Head: Normocephalic and atraumatic.      Nose: Nose normal.      Mouth/Throat:      Mouth: Mucous membranes are moist.      Pharynx: Oropharynx is clear.   Eyes:      Pupils: Pupils are equal, round, and reactive to light.   Cardiovascular:      Rate and Rhythm: Normal rate and regular rhythm.   Pulmonary:      Effort: Pulmonary effort is normal.   Musculoskeletal:         General: Normal range of motion.      Cervical back: Normal range of motion and neck supple.   Skin:     " General: Skin is warm and dry.      Capillary Refill: Capillary refill takes less than 2 seconds.      Findings: Rash present. Rash is papular, urticarial (Only to left arm) and vesicular.             Comments: Widespread pruritic papular rash, some with vesicles forming, generalized across entire body including face.    Neurological:      General: No focal deficit present.      Mental Status: She is alert.   Psychiatric:         Mood and Affect: Mood normal.         Behavior: Behavior normal.

## 2024-09-19 NOTE — ASSESSMENT & PLAN NOTE
I feel the patient's symptoms are consistent with an allergic reaction not varicella.  Prednisone taper was ordered for treatment of her symptoms.  She was also prescribed Pepcid 20 mg twice daily for the histamine response.  She was also advised to begin taking Claritin 10 mg in the morning and Benadryl 25 mg at bedtime.  I did advise her that if she plans to continue to give herself tattoos she should only complete small areas at a time to see if this is what she is reacting to.  Orders:    predniSONE 10 mg tablet; Use 5 tablets for 2 days. Use 4 tablets for 2 days. Use 3 tablets for 2 days. Use 2 tablets for 2 days. Use 1 tablet for 2 days.    famotidine (PEPCID) 20 mg tablet; Take 1 tablet (20 mg total) by mouth 2 (two) times a day for 7 days

## 2024-09-19 NOTE — PATIENT INSTRUCTIONS
Your rash appears to be consistent with Chickenpox.  Antiviral medications sent  to the pharmacy - take as directed.  Take Benadryl as needed for itching  Tylenol as needed for pain/fever    Follow up with Primary Care Provider for further work up of possible autoimmune issues that has been diagnosed and for possible immunization titers with your PCP.    Proceed to Emergency Department if symptoms worsen.    If tests have been performed at [facility], our office will contact you with results if changes need to be made to the care plan discussed with you at the visit.  You can review your full results on [facility] My[facility].

## 2024-09-24 NOTE — TELEPHONE ENCOUNTER
Andreas Hardy MD   to Me       9/17/24 12:17 PM  Please find out what some of her symptoms are, and then we can consider the medication.  Of note, she did have procedure with GYN recently, which likely caused some irritation leading to this (IUD removal).

## 2024-10-05 ENCOUNTER — OFFICE VISIT (OUTPATIENT)
Age: 24
End: 2024-10-05
Payer: COMMERCIAL

## 2024-10-05 VITALS
SYSTOLIC BLOOD PRESSURE: 120 MMHG | RESPIRATION RATE: 18 BRPM | BODY MASS INDEX: 28.93 KG/M2 | OXYGEN SATURATION: 98 % | HEART RATE: 70 BPM | DIASTOLIC BLOOD PRESSURE: 60 MMHG | TEMPERATURE: 96.4 F | WEIGHT: 180 LBS | HEIGHT: 66 IN

## 2024-10-05 DIAGNOSIS — B96.89 ACUTE BACTERIAL SINUSITIS: Primary | ICD-10-CM

## 2024-10-05 DIAGNOSIS — J01.90 ACUTE BACTERIAL SINUSITIS: Primary | ICD-10-CM

## 2024-10-05 DIAGNOSIS — B37.9 ANTIBIOTIC-INDUCED YEAST INFECTION: ICD-10-CM

## 2024-10-05 DIAGNOSIS — T36.95XA ANTIBIOTIC-INDUCED YEAST INFECTION: ICD-10-CM

## 2024-10-05 PROCEDURE — 99213 OFFICE O/P EST LOW 20 MIN: CPT

## 2024-10-05 RX ORDER — FLUCONAZOLE 150 MG/1
150 TABLET ORAL DAILY
Qty: 2 TABLET | Refills: 0 | Status: SHIPPED | OUTPATIENT
Start: 2024-10-05 | End: 2024-10-07

## 2024-10-05 RX ORDER — AMOXICILLIN 875 MG
875 TABLET ORAL 2 TIMES DAILY
Qty: 14 TABLET | Refills: 0 | Status: SHIPPED | OUTPATIENT
Start: 2024-10-05 | End: 2024-10-12

## 2024-10-05 NOTE — LETTER
October 5, 2024     Patient: Devi Alarcon   YOB: 2000   Date of Visit: 10/5/2024       To Whom it May Concern:    Devi Alarcon was seen in my clinic on 10/5/2024. She may return to school on Monday 10/7/2024 as long as fever free for 24 hours without use of fever reducing medication. If fever is present, must wait an additional 24 hours to be fever free before returning to school/work.      If you have any questions or concerns, please don't hesitate to call.         Sincerely,          ANGELINE Tadeo        CC: No Recipients

## 2024-10-05 NOTE — PATIENT INSTRUCTIONS
Take antibiotics as prescribed for sinus infection  For decongestion, Over The Counter medications:  2nd Generation antihistamines with a decongestant such as:   Claritin-D (Loratadine with Pseudoephedrine) or  Zyrtec-D (Cetirizine with Pseudoephedrine) or   Allegra-D (Fexofenadine with Pseudoephedrine) or   Decongestant alone: Pseudoephedrine 60mg PO every 4-6 hours for nasal congestion. Max 240mg in 24 hour period  Claritin or Zyrtec plus -Coricidin HBP (Safe for when you have high blood pressure)  Nasal corticosteroid: examples are Flonase or Nasacort.  Nasal saline irrigation  Humidified air  Warm moist air such as a hot cup of water in a mug, sit at the dining room table with the mug on the table, put a towel over your head to cover over the mug and breath in the warm steam (don't drink the fluid in case you have mucus that drips in).  Vicks Vapor Rub  Over the counter Mucinex and increase you fluid intake.  For Cough or sore throat:  Salt water gurgle  Teaspoon of Honey up to 3x/day  Chloraseptic spray  Throat lozenges  Over the Counter Tylenol or Ibuprofen  Dextromethorphan 30mg PO every 6-8 hours for cough. max 120 mg in 24 hour period.      Follow up with Primary Care Provider in 3-5 days if not improving.  Proceed to Emergency Department if symptoms worsen.    If tests have been performed at [facility], our office will contact you with results if changes need to be made to the care plan discussed with you at the visit.  You can review your full results on [facility].

## 2024-10-05 NOTE — PROGRESS NOTES
[facility]        NAME: Devi Alarcon is a 24 y.o. female  : 2000    MRN: [id number]  DATE: 2024  TIME: 2:29 PM    Assessment and Plan   Acute bacterial sinusitis [J01.90, B96.89]  1. Acute bacterial sinusitis  amoxicillin (AMOXIL) 875 mg tablet      2. Antibiotic-induced yeast infection  fluconazole (DIFLUCAN) 150 mg tablet        Reports that there is no chance she could be pregnant right now.  States she usually gets yeast infections from antibiotic use and would like Diflucan as well.  Patient Instructions   Take antibiotics as prescribed for sinus infection  For decongestion, Over The Counter medications:  2nd Generation antihistamines with a decongestant such as:   Claritin-D (Loratadine with Pseudoephedrine) or  Zyrtec-D (Cetirizine with Pseudoephedrine) or   Allegra-D (Fexofenadine with Pseudoephedrine) or   Decongestant alone: Pseudoephedrine 60mg PO every 4-6 hours for nasal congestion. Max 240mg in 24 hour period  Claritin or Zyrtec plus -Coricidin HBP (Safe for when you have high blood pressure)  Nasal corticosteroid: examples are Flonase or Nasacort.  Nasal saline irrigation  Humidified air  Warm moist air such as a hot cup of water in a mug, sit at the dining room table with the mug on the table, put a towel over your head to cover over the mug and breath in the warm steam (don't drink the fluid in case you have mucus that drips in).  Vicks Vapor Rub  Over the counter Mucinex and increase you fluid intake.  For Cough or sore throat:  Salt water gurgle  Teaspoon of Honey up to 3x/day  Chloraseptic spray  Throat lozenges  Over the Counter Tylenol or Ibuprofen  Dextromethorphan 30mg PO every 6-8 hours for cough. max 120 mg in 24 hour period.      Follow up with Primary Care Provider in 3-5 days if not improving.  Proceed to Emergency Department if symptoms worsen.    If tests have been performed at [facility], our office will contact you with results if changes need to be made  to the care plan discussed with you at the visit.  You can review your full results on [facility].    Chief Complaint     Chief Complaint   Patient presents with    Cold Like Symptoms     C/o sinus pain, chills, PND, sore throat, cough and starting with chest congestion x 4-5 days.          History of Present Illness       Sinus pain, postnasal drip, chills, sore throat, cough along with chest congestion starting about 5 days ago.  Daughter is ill as well.        Review of Systems   Review of Systems   Constitutional:  Positive for fever. Negative for chills.   HENT:  Positive for congestion, ear pain, postnasal drip, rhinorrhea, sinus pressure, sinus pain and sore throat.    Eyes:  Negative for pain and visual disturbance.   Respiratory:  Positive for cough. Negative for shortness of breath.    Cardiovascular:  Negative for chest pain and palpitations.   Gastrointestinal:  Negative for abdominal pain and vomiting.   Genitourinary:  Negative for dysuria and hematuria.   Musculoskeletal:  Negative for arthralgias and back pain.   Skin:  Negative for color change and rash.   Neurological:  Positive for headaches. Negative for seizures and syncope.   All other systems reviewed and are negative.        Current Medications       Current Outpatient Medications:     amoxicillin (AMOXIL) 875 mg tablet, Take 1 tablet (875 mg total) by mouth 2 (two) times a day for 7 days, Disp: 14 tablet, Rfl: 0    buPROPion (WELLBUTRIN XL) 150 mg 24 hr tablet, Take 1 tablet (150 mg total) by mouth every morning, Disp: 30 tablet, Rfl: 5    clindamycin (CLEOCIN T) 1 % lotion, APPLY TO RASH ONCE DAILY AS NEEDED FOR FLARES., Disp: , Rfl:     fluconazole (DIFLUCAN) 150 mg tablet, Take 1 tablet (150 mg total) by mouth daily for 2 doses May take 2nd dose if still symptomatic after 72 hours from first dose., Disp: 2 tablet, Rfl: 0    medroxyPROGESTERone (DEPO-PROVERA) 150 mg/mL injection, Inject 1 mL (150 mg total) into a muscle every 3  (three) months, Disp: 1 mL, Rfl: 3    Multiple Vitamins-Minerals (IMMUNE SUPPORT PO), Take by mouth, Disp: , Rfl:     Retin-A 0.025 % cream, APPLY TO AFFECTED AREA THREE NIGHTS A WEEK. INCREASE TO NIGHTLY AS TOLERATED, Disp: , Rfl:     rizatriptan (MAXALT-MLT) 10 mg disintegrating tablet, Take 1 tablet (10 mg total) by mouth once as needed for migraine for up to 54 doses may repeat in 2 hours if necessary, Disp: 9 tablet, Rfl: 5    spironolactone (ALDACTONE) 50 mg tablet, Take 50 mg by mouth 2 (two) times a day, Disp: , Rfl:     Blood Glucose Monitoring Suppl (OneTouch Verio Reflect) w/Device KIT, Check blood sugars once daily. Please substitute with appropriate alternative as covered by patient's insurance. Dx: E11.65, Disp: 1 kit, Rfl: 0    famotidine (PEPCID) 20 mg tablet, Take 1 tablet (20 mg total) by mouth 2 (two) times a day for 7 days, Disp: 14 tablet, Rfl: 0    glucose blood (OneTouch Verio) test strip, Check blood sugars once daily. Please substitute with appropriate alternative as covered by patient's insurance. Dx: E11.65, Disp: 100 each, Rfl: 3    miconazole (MONISTAT 7) 100 mg vaginal suppository, Insert 1 suppository (100 mg total) into the vagina daily at bedtime, Disp: 7 suppository, Rfl: 0    OneTouch Delica Lancets 33G MISC, Check blood sugars once daily. Please substitute with appropriate alternative as covered by patient's insurance. Dx: E11.65, Disp: 100 each, Rfl: 3    predniSONE 10 mg tablet, Use 5 tablets for 2 days. Use 4 tablets for 2 days. Use 3 tablets for 2 days. Use 2 tablets for 2 days. Use 1 tablet for 2 days. (Patient not taking: Reported on 10/5/2024), Disp: 30 tablet, Rfl: 0    Retin-A 0.05 % cream, Apply topically, Disp: , Rfl:     Current Facility-Administered Medications:     medroxyPROGESTERone (DEPO-PROVERA) IM injection 150 mg, 150 mg, Intramuscular, Q3 Months, , 150 mg at 09/09/24 1337    Current Allergies     Allergies as of 10/05/2024 - Reviewed 10/05/2024   Allergen  "Reaction Noted    Lexapro [escitalopram] Nausea Only and Vomiting 09/10/2024    Sulfa antibiotics Vomiting 06/15/2013    Latex Rash 11/29/2021            The following portions of the patient's history were reviewed and updated as appropriate: allergies, current medications, past family history, past medical history, past social history, past surgical history and problem list.     Past Medical History:   Diagnosis Date    Chlamydia     COVID-19 virus infection 05/19/2021       Past Surgical History:   Procedure Laterality Date    NASAL TURBINATE REDUCTION  2024    TONSILLECTOMY  2024    TYMPANOPLASTY Right     TYMPANOSTOMY TUBE PLACEMENT         Family History   Problem Relation Age of Onset    Multiple sclerosis Mother     Thyroid disease Mother     Fibromyalgia Mother     Migraines Mother     Depression Mother     Kidney cancer Maternal Grandmother     Heart attack Maternal Grandmother     Lupus Maternal Grandmother     Skin cancer Maternal Grandmother     Heart disease Maternal Grandmother     Diabetes Maternal Grandfather     Diabetes Maternal Aunt     Polycystic ovary syndrome Maternal Aunt     Depression Maternal Aunt     Anxiety disorder Maternal Aunt     Cancer Maternal Uncle     Depression Father     Bipolar disorder Father     No Known Problems Paternal Grandmother     Cancer Paternal Grandfather     Asperger's syndrome Half-Brother     Suicidality Half-Brother     Mental illness Neg Hx     Substance Abuse Neg Hx     Breast cancer Neg Hx     Colon cancer Neg Hx     Ovarian cancer Neg Hx          Medications have been verified.        Objective   /60 (BP Location: Right arm, Patient Position: Sitting)   Pulse 70   Temp (!) 96.4 °F (35.8 °C)   Resp 18   Ht 5' 5.5\" (1.664 m)   Wt 81.6 kg (180 lb)   LMP 09/18/2024 (Exact Date)   SpO2 98%   BMI 29.50 kg/m²   Patient's last menstrual period was 09/18/2024 (exact date).       Physical Exam     Physical Exam  Vitals and nursing note reviewed. "   Constitutional:       Appearance: Normal appearance.   HENT:      Head: Normocephalic and atraumatic.      Nose: Congestion and rhinorrhea present. Rhinorrhea is purulent.      Right Sinus: Frontal sinus tenderness present. No maxillary sinus tenderness.      Left Sinus: Frontal sinus tenderness present. No maxillary sinus tenderness.   Cardiovascular:      Rate and Rhythm: Normal rate.      Pulses: Normal pulses.   Pulmonary:      Effort: Pulmonary effort is normal.      Breath sounds: Normal breath sounds.   Skin:     General: Skin is warm and dry.      Capillary Refill: Capillary refill takes less than 2 seconds.   Neurological:      General: No focal deficit present.      Mental Status: She is alert and oriented to person, place, and time. Mental status is at baseline.      Sensory: No sensory deficit.      Motor: No weakness.   Psychiatric:         Mood and Affect: Mood normal.         Behavior: Behavior normal.         Thought Content: Thought content normal.

## 2024-10-29 ENCOUNTER — TELEPHONE (OUTPATIENT)
Age: 24
End: 2024-10-29

## 2024-10-29 NOTE — TELEPHONE ENCOUNTER
If she feels the Wellbutrin is working, she can continue that, though I would recommend having an office visit to discuss this further.  She has not been seen for this in a while.

## 2024-10-29 NOTE — TELEPHONE ENCOUNTER
Patient has been advised with the below mentioned recommendations from Dr. Hardy. Patient advises she leaves in reading now so a virtual video will work best for her at this time. Patient has been scheduled for 10/20 at 6pm with TH virtually.

## 2024-10-30 ENCOUNTER — TELEMEDICINE (OUTPATIENT)
Dept: FAMILY MEDICINE CLINIC | Facility: CLINIC | Age: 24
End: 2024-10-30
Payer: COMMERCIAL

## 2024-10-30 DIAGNOSIS — F41.1 GENERALIZED ANXIETY DISORDER: Primary | ICD-10-CM

## 2024-10-30 DIAGNOSIS — J45.21 MILD INTERMITTENT ASTHMA WITH ACUTE EXACERBATION: ICD-10-CM

## 2024-10-30 DIAGNOSIS — U09.9 POST-COVID SYNDROME: ICD-10-CM

## 2024-10-30 DIAGNOSIS — F31.70 BIPOLAR DISORDER IN FULL REMISSION, MOST RECENT EPISODE UNSPECIFIED TYPE (HCC): ICD-10-CM

## 2024-10-30 PROCEDURE — 99214 OFFICE O/P EST MOD 30 MIN: CPT | Performed by: FAMILY MEDICINE

## 2024-10-30 RX ORDER — ALBUTEROL SULFATE 90 UG/1
2 INHALANT RESPIRATORY (INHALATION) EVERY 4 HOURS PRN
Qty: 6.7 G | Refills: 1 | Status: SHIPPED | OUTPATIENT
Start: 2024-10-30

## 2024-10-30 RX ORDER — BUPROPION HYDROCHLORIDE 300 MG/1
300 TABLET ORAL EVERY MORNING
Qty: 30 TABLET | Refills: 5 | Status: SHIPPED | OUTPATIENT
Start: 2024-10-30 | End: 2025-04-28

## 2024-10-30 NOTE — PROGRESS NOTES
Virtual Regular Visit  Name: Devi Alarcon      : 2000      MRN: [id number]  Encounter Provider: Andreas Hardy MD  Encounter Date: 10/30/2024   Encounter department: [facility]    Verification of patient location:    Patient is located at Home in the following state in which I hold an active license [place]    Assessment & Plan  Generalized anxiety disorder  No side effect so far with Wellbutrin.  Increase to 300 as she mentioned there was still some anxiety aspect of it that did not seem to be completely treated yet.    Orders:    buPROPion (WELLBUTRIN XL) 300 mg 24 hr tablet; Take 1 tablet (300 mg total) by mouth every morning    Bipolar disorder in full remission, most recent episode unspecified type (HCC)  Doing better with Wellbutrin.  Will increase to 300 for the anxiety aspect.         Post-COVID syndrome  Patient appears to have post-COVID syndrome.  Has been going on for about 4 weeks.  She does report that there was a chest x-ray, but I do not see it in the chart at the moment.  Would recommend repeat chest x-ray, physical therapy, albuterol.  If she has any more problems or issues, contact the office or come in.    Orders:    XR chest pa and lateral; Future    Ambulatory referral to Physical Therapy; Future    albuterol (Proventil HFA) 90 mcg/act inhaler; Inhale 2 puffs every 4 (four) hours as needed for wheezing or shortness of breath    Mild intermittent asthma with acute exacerbation  Would recommend albuterol for asthma.  Sent to pharmacy.  Part of this is likely post-COVID, long COVID.  See discussion of long COVID.    Orders:    albuterol (Proventil HFA) 90 mcg/act inhaler; Inhale 2 puffs every 4 (four) hours as needed for wheezing or shortness of breath         Encounter provider Andreas Hardy MD    The patient was identified by name and date of birth. Devi Alarcon was informed that this is a telemedicine visit and that the visit is being conducted  through the Epic Embedded platform. She agrees to proceed..  My office door was closed. No one else was in the room.  She acknowledged consent and understanding of privacy and security of the video platform. The patient has agreed to participate and understands they can discontinue the visit at any time.    Patient is aware this is a billable service.     Chief Complaint   Patient presents with    Medication Management    Cough    Sore Throat     History of Present Illness     Wellbutrin: Seems to be helping. Less nausea.  Mood stable, not sure about the anxiety overall.    Noting illness for the last several weeks.  Had COVID first.  Has post nasal drip, some irritation with this, productive cough.  SOB/HAWLEY now.  Used multiple things over this timeframe.            Review of Systems   Constitutional:  Positive for fatigue.   HENT:  Positive for congestion and postnasal drip.    Respiratory:  Positive for cough and shortness of breath.    Cardiovascular: Negative.    Gastrointestinal: Negative.    Musculoskeletal: Negative.    Neurological:  Positive for weakness.   Psychiatric/Behavioral:  Negative for dysphoric mood, self-injury and suicidal ideas. The patient is nervous/anxious.            Objective     There were no vitals taken for this visit.  Physical Exam  Nursing note reviewed.   Constitutional:       General: She is not in acute distress.     Appearance: She is well-developed. She is not ill-appearing.   HENT:      Head: Normocephalic and atraumatic.   Eyes:      Conjunctiva/sclera: Conjunctivae normal.      Pupils: Pupils are equal, round, and reactive to light.   Pulmonary:      Effort: Pulmonary effort is normal.      Breath sounds: Normal breath sounds.         Visit Time  Total Visit Duration: 30

## 2024-10-30 NOTE — ASSESSMENT & PLAN NOTE
Patient appears to have post-COVID syndrome.  Has been going on for about 4 weeks.  She does report that there was a chest x-ray, but I do not see it in the chart at the moment.  Would recommend repeat chest x-ray, physical therapy, albuterol.  If she has any more problems or issues, contact the office or come in.    Orders:    XR chest pa and lateral; Future    Ambulatory referral to Physical Therapy; Future    albuterol (Proventil HFA) 90 mcg/act inhaler; Inhale 2 puffs every 4 (four) hours as needed for wheezing or shortness of breath

## 2024-10-30 NOTE — ASSESSMENT & PLAN NOTE
No side effect so far with Wellbutrin.  Increase to 300 as she mentioned there was still some anxiety aspect of it that did not seem to be completely treated yet.    Orders:    buPROPion (WELLBUTRIN XL) 300 mg 24 hr tablet; Take 1 tablet (300 mg total) by mouth every morning

## 2024-10-30 NOTE — ASSESSMENT & PLAN NOTE
Would recommend albuterol for asthma.  Sent to pharmacy.  Part of this is likely post-COVID, long COVID.  See discussion of long COVID.    Orders:    albuterol (Proventil HFA) 90 mcg/act inhaler; Inhale 2 puffs every 4 (four) hours as needed for wheezing or shortness of breath

## 2024-10-30 NOTE — PATIENT INSTRUCTIONS
1. Generalized anxiety disorder  Assessment & Plan:  No side effect so far with Wellbutrin.  Increase to 300 as she mentioned there was still some anxiety aspect of it that did not seem to be completely treated yet.    Orders:    buPROPion (WELLBUTRIN XL) 300 mg 24 hr tablet; Take 1 tablet (300 mg total) by mouth every morning    Orders:  -     buPROPion (WELLBUTRIN XL) 300 mg 24 hr tablet; Take 1 tablet (300 mg total) by mouth every morning  2. Bipolar disorder in full remission, most recent episode unspecified type ([facility])  Assessment & Plan:  Doing better with Wellbutrin.  Will increase to 300 for the anxiety aspect.         3. Post-COVID syndrome  Assessment & Plan:  Patient appears to have post-COVID syndrome.  Has been going on for about 4 weeks.  She does report that there was a chest x-ray, but I do not see it in the chart at the moment.  Would recommend repeat chest x-ray, physical therapy, albuterol.  If she has any more problems or issues, contact the office or come in.    Orders:    XR chest pa and lateral; Future    Ambulatory referral to Physical Therapy; Future    albuterol (Proventil HFA) 90 mcg/act inhaler; Inhale 2 puffs every 4 (four) hours as needed for wheezing or shortness of breath    Orders:  -     XR chest pa and lateral; Future; Expected date: 10/30/2024  -     Ambulatory referral to Physical Therapy; Future; Expected date: 10/30/2024  -     albuterol (Proventil HFA) 90 mcg/act inhaler; Inhale 2 puffs every 4 (four) hours as needed for wheezing or shortness of breath  4. Mild intermittent asthma with acute exacerbation  Assessment & Plan:  Would recommend albuterol for asthma.  Sent to pharmacy.  Part of this is likely post-COVID, long COVID.  See discussion of long COVID.    Orders:    albuterol (Proventil HFA) 90 mcg/act inhaler; Inhale 2 puffs every 4 (four) hours as needed for wheezing or shortness of breath    Orders:  -     albuterol (Proventil HFA) 90 mcg/act inhaler; Inhale 2 puffs  every 4 (four) hours as needed for wheezing or shortness of breath      COVID 19 Instructions    Devi Alarcon was advised to limit contact with others to essential tasks such as getting food, medications, and medical care.    Proper handwashing reviewed, and Hand sanitzer when washing is not available.    If the patient develops symptoms of COVID 19, the patient should call the office as soon as possible.    It is strongly recommended that Flu Vaccinations be obtained.      Virtual Visits:  Emily: This works on smart phones (any phone with Internet browsing capability).  You should get a text message when the provider is ready to see you.  Click on the link in the text message, and the call should start.  You will need to type in your name, and allow camera and microphone access.  This is HIPPA compliant, and secure.      If you have not already done so, get immunized to COVID 19.      We are committed to getting you vaccinated as soon as possible and will be closely following [facility] guidelines as they are released and revised.  Please refer to our COVID-19 vaccine webpage for the most up to date information on the vaccine and our distribution efforts.    This site will also have the most up to date recommendations for COVID booster vaccine.    [email or web address]    Call [phone] ([phone]), option 7    You can also visit https://www.vaccines.gov/ to find vaccines in your area.    OUR LOCATION:    [facility]  [id number], [address]  [place], [place], [address] [phone]  Fax: [phone]    Lab services, Rheumatology, and OB/GYN are at this location as well.

## 2024-10-31 ENCOUNTER — TELEPHONE (OUTPATIENT)
Age: 24
End: 2024-10-31

## 2024-11-06 ENCOUNTER — TELEPHONE (OUTPATIENT)
Age: 24
End: 2024-11-06

## 2024-11-06 DIAGNOSIS — R11.0 NAUSEA: Primary | ICD-10-CM

## 2024-11-06 NOTE — TELEPHONE ENCOUNTER
Patient called stating her specialist sent her for Thyroid US. Results are scanned into Media. Patient would like Dr. Hardy to review the results and give her his opinion. Please advise and return patients call.

## 2024-11-08 ENCOUNTER — TELEMEDICINE (OUTPATIENT)
Dept: FAMILY MEDICINE CLINIC | Facility: CLINIC | Age: 24
End: 2024-11-08
Payer: COMMERCIAL

## 2024-11-08 VITALS — BODY MASS INDEX: 28.93 KG/M2 | HEIGHT: 66 IN | WEIGHT: 180 LBS

## 2024-11-08 DIAGNOSIS — E04.1 THYROID CYST: Primary | ICD-10-CM

## 2024-11-08 DIAGNOSIS — E66.3 OVERWEIGHT (BMI 25.0-29.9): ICD-10-CM

## 2024-11-08 DIAGNOSIS — F41.1 GENERALIZED ANXIETY DISORDER: ICD-10-CM

## 2024-11-08 DIAGNOSIS — U09.9 POST COVID-19 CONDITION, UNSPECIFIED: ICD-10-CM

## 2024-11-08 DIAGNOSIS — E16.2 HYPOGLYCEMIA, UNSPECIFIED: ICD-10-CM

## 2024-11-08 PROCEDURE — 99214 OFFICE O/P EST MOD 30 MIN: CPT | Performed by: FAMILY MEDICINE

## 2024-11-08 RX ORDER — VALACYCLOVIR HYDROCHLORIDE 1 G/1
1000 TABLET, FILM COATED ORAL 3 TIMES DAILY
COMMUNITY
Start: 2024-09-19

## 2024-11-08 RX ORDER — FLUTICASONE PROPIONATE 50 MCG
2 SPRAY, SUSPENSION (ML) NASAL DAILY
COMMUNITY
Start: 2024-10-15

## 2024-11-08 RX ORDER — NALTREXONE HYDROCHLORIDE 50 MG/1
TABLET, FILM COATED ORAL
Qty: 21 TABLET | Refills: 0 | Status: SHIPPED | OUTPATIENT
Start: 2024-11-08

## 2024-11-08 RX ORDER — ESCITALOPRAM OXALATE 10 MG/1
10 TABLET ORAL DAILY
COMMUNITY
Start: 2024-07-22

## 2024-11-08 RX ORDER — FAMOTIDINE 20 MG/1
20 TABLET, FILM COATED ORAL DAILY
COMMUNITY
Start: 2024-09-19

## 2024-11-08 NOTE — ASSESSMENT & PLAN NOTE
Consider follow-up with physical therapy for post-COVID Lei  She did mention that she is feeling better with this.

## 2024-11-08 NOTE — PATIENT INSTRUCTIONS
1. Thyroid cyst  Assessment & Plan:  Patient did have thyroid cyst noted on ultrasound at [facility].  She discussed this with endocrine before as well.  It was recommended to repeat the ultrasound in approximately 1 year.  This seems quite reasonable given that it says it is a minor change only.         2. Overweight (BMI 25.0-29.9)  Assessment & Plan:  Weight continues to be a significant problem.  I have some concerns in her for using GLP-1 secondary to their potential for problems with hypoglycemia and the question of that previously.  Based on that, would trial Nadia, added to her current regimen which would include the Wellbutrin.  This should mean that she would start with 1/4 tablet daily for 7 days, then increase to a half a tablet for 7 days, then a whole tablet for 14 days, and reevaluate at her scheduled December appointment.  This would be to ambulate Contrave, but I did not wish to switch her to Contrave, as that would mean titrating up on the Contrave to get back to where she is now with the Wellbutrin, and I would prefer to keep her mental health situation stable as she is doing quite well with the current dosage of Wellbutrin.    Orders:    naltrexone (REVIA) 50 mg tablet; Take 1/4 tablet daily for 7 days, then half tablet daily for 7 days, then 1 tablet daily for 14 days    Orders:  -     naltrexone (REVIA) 50 mg tablet; Take 1/4 tablet daily for 7 days, then half tablet daily for 7 days, then 1 tablet daily for 14 days  3. Generalized anxiety disorder  Assessment & Plan:  Doing much better with Wellbutrin at 300.  Continue.  Would not wish to make any adjustments based on this.         4. Post covid-19 condition, unspecified  Assessment & Plan:  Consider follow-up with physical therapy for post-COVID Lei  She did mention that she is feeling better with this.         5. Hypoglycemia, unspecified  Assessment & Plan:  Laboratory test that were done recently did not really show significant  problems.  Follow-up as scheduled               1. Thyroid cyst  Assessment & Plan:  [facility]  She discussed this with endocrine before as well.  It was recommended to repeat the ultrasound in approximately 1 year.  This seems quite reasonable given that it says it is a minor change only.         2. Overweight (BMI 25.0-29.9)  Assessment & Plan:  Weight continues to be a significant problem.  I have some concerns in her for using GLP-1 secondary to their potential for problems with hypoglycemia and the question of that previously.  Based on that, would trial Nadia, added to her current regimen which would include the Wellbutrin.  This should mean that she would start with 1/4 tablet daily for 7 days, then increase to a half a tablet for 7 days, then a whole tablet for 14 days, and reevaluate at her scheduled December appointment.  This would be to ambulate Contrave, but I did not wish to switch her to Contrave, as that would mean titrating up on the Contrave to get back to where she is now with the Wellbutrin, and I would prefer to keep her mental health situation stable as she is doing quite well with the current dosage of Wellbutrin.    Orders:    naltrexone (REVIA) 50 mg tablet; Take 1/4 tablet daily for 7 days, then half tablet daily for 7 days, then 1 tablet daily for 14 days    Orders:  -     naltrexone (REVIA) 50 mg tablet; Take 1/4 tablet daily for 7 days, then half tablet daily for 7 days, then 1 tablet daily for 14 days  3. Generalized anxiety disorder  Assessment & Plan:  Doing much better with Wellbutrin at 300.  Continue.  Would not wish to make any adjustments based on this.         4. Post covid-19 condition, unspecified  Assessment & Plan:  Consider follow-up with physical therapy for post-COVID Lei  She did mention that she is feeling better with this.         5. Hypoglycemia, unspecified  Assessment & Plan:  Laboratory test that were done  recently did not really show significant problems.  Follow-up as scheduled             Chief Complaint   Patient presents with    Virtual Regular Visit

## 2024-11-08 NOTE — PROGRESS NOTES
Virtual Regular Visit  Name: Devi Alarcon      : 2000      MRN: [id number]  Encounter Provider: Andreas Hardy MD  Encounter Date: 2024   Encounter department: [facility] PRIMARY CARE    Verification of patient location:    Patient is located at Home in the following state in which I hold an active license [place]    Assessment & Plan  Thyroid cyst  Patient did have thyroid cyst noted on ultrasound at [facility].  She discussed this with endocrine before as well.  It was recommended to repeat the ultrasound in approximately 1 year.  This seems quite reasonable given that it says it is a minor change only.         Overweight (BMI 25.0-29.9)  Weight continues to be a significant problem.  I have some concerns in her for using GLP-1 secondary to their potential for problems with hypoglycemia and the question of that previously.  Based on that, would trial Nadia, added to her current regimen which would include the Wellbutrin.  This should mean that she would start with 1/4 tablet daily for 7 days, then increase to a half a tablet for 7 days, then a whole tablet for 14 days, and reevaluate at her scheduled December appointment.  This would be to ambulate Contrave, but I did not wish to switch her to Contrave, as that would mean titrating up on the Contrave to get back to where she is now with the Wellbutrin, and I would prefer to keep her mental health situation stable as she is doing quite well with the current dosage of Wellbutrin.    Orders:    naltrexone (REVIA) 50 mg tablet; Take 1/4 tablet daily for 7 days, then half tablet daily for 7 days, then 1 tablet daily for 14 days    Generalized anxiety disorder  Doing much better with Wellbutrin at 300.  Continue.  Would not wish to make any adjustments based on this.         Post covid-19 condition, unspecified  Consider follow-up with physical therapy for post-COVID Lei  She did mention that she is feeling better with this.          Hypoglycemia, unspecified  Laboratory test that were done recently did not really show significant problems.  Follow-up as scheduled              Encounter provider Andreas Hardy MD    The patient was identified by name and date of birth. Devi Alarcon was informed that this is a telemedicine visit and that the visit is being conducted through the Epic Embedded platform. She agrees to proceed..  My office door was closed. No one else was in the room.  She acknowledged consent and understanding of privacy and security of the video platform. The patient has agreed to participate and understands they can discontinue the visit at any time.    Patient is aware this is a billable service.       1. Thyroid cyst  Assessment & Plan:  Patient did have thyroid cyst noted on ultrasound at [facility].  She discussed this with endocrine before as well.  It was recommended to repeat the ultrasound in approximately 1 year.  This seems quite reasonable given that it says it is a minor change only.  2. Overweight (BMI 25.0-29.9)  Assessment & Plan:  Weight continues to be a significant problem.  I have some concerns in her for using GLP-1 secondary to their potential for problems with hypoglycemia and the question of that previously.  Based on that, would trial Nadia, added to her current regimen which would include the Wellbutrin.  This should mean that she would start with 1/4 tablet daily for 7 days, then increase to a half a tablet for 7 days, then a whole tablet for 14 days, and reevaluate at her scheduled December appointment.  This would be to ambulate Contrave, but I did not wish to switch her to Contrave, as that would mean titrating up on the Contrave to get back to where she is now with the Wellbutrin, and I would prefer to keep her mental health situation stable as she is doing quite well with the current dosage of Wellbutrin.  Orders:  -     naltrexone (REVIA) 50 mg tablet; Take 1/4 tablet daily for  "7 days, then half tablet daily for 7 days, then 1 tablet daily for 14 days  3. Generalized anxiety disorder  Assessment & Plan:  Doing much better with Wellbutrin at 300.  Continue.  Would not wish to make any adjustments based on this.  4. Post covid-19 condition, unspecified  Assessment & Plan:  Consider follow-up with physical therapy for post-COVID Lei  She did mention that she is feeling better with this.  5. Hypoglycemia, unspecified  Assessment & Plan:  Laboratory test that were done recently did not really show significant problems.  Follow-up as scheduled      Chief Complaint   Patient presents with    Virtual Regular Visit       History of Present Illness     Here to follow up on multiple issues.  Labs from [facility] reviewed.  US from [facility] reviewed.    Post COVID: Still has some cough and irritation.  Using Albuterol. Is improving.    VIK: Wellbutirn seems to be helping.              Review of Systems   Constitutional:  Negative for appetite change and fever.   Respiratory:  Negative for chest tightness and shortness of breath.    Cardiovascular:  Negative for chest pain, palpitations and leg swelling.   Gastrointestinal:  Negative for abdominal pain.   Genitourinary:  Negative for difficulty urinating.   Musculoskeletal:  Negative for arthralgias and myalgias.   Skin:  Negative for wound.   Neurological:  Negative for dizziness, light-headedness and headaches.   Psychiatric/Behavioral:  Negative for dysphoric mood and sleep disturbance. The patient is not nervous/anxious.            Objective     Ht 5' 5.5\" (1.664 m)   Wt 81.6 kg (180 lb)   BMI 29.50 kg/m²   Physical Exam    Visit Time  Total Visit Duration: 30        "

## 2024-11-08 NOTE — ASSESSMENT & PLAN NOTE
Laboratory test that were done recently did not really show significant problems.  Follow-up as scheduled

## 2024-11-08 NOTE — ASSESSMENT & PLAN NOTE
Patient did have thyroid cyst noted on ultrasound at [facility].  She discussed this with endocrine before as well.  It was recommended to repeat the ultrasound in approximately 1 year.  This seems quite reasonable given that it says it is a minor change only.

## 2024-11-08 NOTE — ASSESSMENT & PLAN NOTE
Doing much better with Wellbutrin at 300.  Continue.  Would not wish to make any adjustments based on this.

## 2024-11-08 NOTE — ASSESSMENT & PLAN NOTE
Weight continues to be a significant problem.  I have some concerns in her for using GLP-1 secondary to their potential for problems with hypoglycemia and the question of that previously.  Based on that, would trial Nadia, added to her current regimen which would include the Wellbutrin.  This should mean that she would start with 1/4 tablet daily for 7 days, then increase to a half a tablet for 7 days, then a whole tablet for 14 days, and reevaluate at her scheduled December appointment.  This would be to ambulate Contrave, but I did not wish to switch her to Contrave, as that would mean titrating up on the Contrave to get back to where she is now with the Wellbutrin, and I would prefer to keep her mental health situation stable as she is doing quite well with the current dosage of Wellbutrin.    Orders:    naltrexone (REVIA) 50 mg tablet; Take 1/4 tablet daily for 7 days, then half tablet daily for 7 days, then 1 tablet daily for 14 days

## 2024-11-12 ENCOUNTER — TELEPHONE (OUTPATIENT)
Age: 24
End: 2024-11-12

## 2024-11-12 RX ORDER — ONDANSETRON 4 MG/1
4 TABLET, FILM COATED ORAL EVERY 8 HOURS PRN
Qty: 20 TABLET | Refills: 0 | Status: SHIPPED | OUTPATIENT
Start: 2024-11-12

## 2024-11-12 NOTE — TELEPHONE ENCOUNTER
PROGRESS NOTE     Subjective     Alcides is a 69 year old here for Diabetes (Follow up/)     Patient is following up for his diabetes. Currently on actos, trulicity, jardiance and metformin. Is compliant. Did bring in his blood sugar meter that did show ranges from . He takes his sugars at random times of the day and before and after meals. No signs or symptoms of hypoglycemia. Has been working on his diet by avoiding some sweets.     Review of Systems  As documented above.    SDOH Never Smoker     Objective   Vitals:    12/15/23 1057   BP: 112/84   Pulse: 91   Temp: 97.6 °F (36.4 °C)   TempSrc: Oral   SpO2: 98%   Weight: 71.6 kg (157 lb 15.4 oz)   Height: 5' 7\" (1.702 m)     Physical Exam  General: Alert.    Respiratory: Normal respiratory effort.   Cardiovascular: Normal rate and regular rhythm. Normal S1, S2. Murmurs are not present.  Lungs: Clear to auscultation. Wheezing, rales or rhonchi not present.  Musculoskeletal: Gait: normal.  Psychiatric: Mood is normal and affect is normal.           ASSESSMENT AND PLAN        1. Type 2 diabetes mellitus with hyperglycemia, without long-term current use of insulin (CMD)  Last A1C of 7.7 on 12/15/23. Wellcontrolled.   Patient is compliant on current regimen of actos, jardiance, metformin, trulicity  Will No adjustments made to day  Patient is up to date on recommended screening associated with type 2 diabetes, but BMP screening tests were offered today as it is coming due in JAn  -     POCT Glycohemoglobin Analyzer  -     Basic Metabolic Panel; Future      Follow Up  Return in about 5 months (around 5/15/2024) for Diabetes and MWV.        Patient discussed with Dr. Jero Hong MD  PGY3   Patient called and wanted to let the PCP know that she is sure the new medication, revia, is working but its really making her very nauseous.  Is there something she can take to help with that?   Pls advise     Thank you!

## 2024-11-12 NOTE — TELEPHONE ENCOUNTER
Could use walker chews OTC. Can trial Zofran.  Stay with lower dose as well.  Ill send some Zofran to pharmacy.

## 2024-11-13 NOTE — TELEPHONE ENCOUNTER
Left detailed message advising patient of Dr. Hardy recommendations. Mychart message sent as well.

## 2024-11-20 ENCOUNTER — RESULTS FOLLOW-UP (OUTPATIENT)
Dept: FAMILY MEDICINE CLINIC | Facility: CLINIC | Age: 24
End: 2024-11-20

## 2024-11-22 DIAGNOSIS — F41.1 GENERALIZED ANXIETY DISORDER: ICD-10-CM

## 2024-11-22 RX ORDER — BUPROPION HYDROCHLORIDE 300 MG/1
300 TABLET ORAL EVERY MORNING
Qty: 90 TABLET | Refills: 1 | Status: SHIPPED | OUTPATIENT
Start: 2024-11-22 | End: 2025-05-21

## 2024-11-26 ENCOUNTER — CLINICAL SUPPORT (OUTPATIENT)
Dept: OBGYN CLINIC | Facility: MEDICAL CENTER | Age: 24
End: 2024-11-26
Payer: COMMERCIAL

## 2024-11-26 DIAGNOSIS — Z30.42 ENCOUNTER FOR DEPO-PROVERA CONTRACEPTION: Primary | ICD-10-CM

## 2024-11-26 PROCEDURE — 96372 THER/PROPH/DIAG INJ SC/IM: CPT

## 2024-11-26 RX ADMIN — MEDROXYPROGESTERONE ACETATE 150 MG: 150 INJECTION, SUSPENSION INTRAMUSCULAR at 15:07

## 2024-11-26 NOTE — PROGRESS NOTES
Patient presents for Depo-Provera injection.  Patient supplied medication.  Patient tolerated IM injection left deltoid.  Denied questions or concerns at conclusion of conversation.  Aware repeat injection is due in 3 months.  Encouraged to schedule prior to leaving office at today's visit.  UPT in office today YES. Neg   Lot # [id number] NDC [id number] Expiration date 2026/12     Melolabial Interpolation Flap Text: A decision was made to reconstruct the defect utilizing an interpolation axial flap and a staged reconstruction.  A telfa template was made of the defect.  This telfa template was then used to outline the melolabial interpolation flap.  The donor area for the pedicle flap was then injected with anesthesia.  The flap was excised through the skin and subcutaneous tissue down to the layer of the underlying musculature.  The pedicle flap was carefully excised within this deep plane to maintain its blood supply.  The edges of the donor site were undermined.   The donor site was closed in a primary fashion.  The pedicle was then rotated into position and sutured.  Once the tube was sutured into place, adequate blood supply was confirmed with blanching and refill.  The pedicle was then wrapped with xeroform gauze and dressed appropriately with a telfa and gauze bandage to ensure continued blood supply and protect the attached pedicle.

## 2024-12-30 ENCOUNTER — TELEMEDICINE (OUTPATIENT)
Dept: FAMILY MEDICINE CLINIC | Facility: CLINIC | Age: 24
End: 2024-12-30
Payer: COMMERCIAL

## 2024-12-30 VITALS — WEIGHT: 187.7 LBS | BODY MASS INDEX: 30.76 KG/M2

## 2024-12-30 DIAGNOSIS — G43.809 OTHER MIGRAINE WITHOUT STATUS MIGRAINOSUS, NOT INTRACTABLE: ICD-10-CM

## 2024-12-30 DIAGNOSIS — E16.2 HYPOGLYCEMIA, UNSPECIFIED: ICD-10-CM

## 2024-12-30 DIAGNOSIS — E66.3 OVERWEIGHT (BMI 25.0-29.9): ICD-10-CM

## 2024-12-30 DIAGNOSIS — K21.9 GASTROESOPHAGEAL REFLUX DISEASE, UNSPECIFIED WHETHER ESOPHAGITIS PRESENT: ICD-10-CM

## 2024-12-30 DIAGNOSIS — F41.1 GENERALIZED ANXIETY DISORDER: Primary | ICD-10-CM

## 2024-12-30 DIAGNOSIS — E04.1 THYROID CYST: ICD-10-CM

## 2024-12-30 PROCEDURE — 99214 OFFICE O/P EST MOD 30 MIN: CPT | Performed by: FAMILY MEDICINE

## 2024-12-30 NOTE — ASSESSMENT & PLAN NOTE
{Patient does have some significant issues with regard to obesity/overweight.  Unfortunately, she was not able to tolerate naltrexone.  This suggest that she would not do well with that combination with Wellbutrin.  Since the Wellbutrin is working very well for anxiety and depression, I would not recommend a change.  I did recommend that she follow-up with bariatric program at [facility], as that is much closer to her home.    Orders:  •  Ambulatory Referral to Weight Management; Future

## 2024-12-30 NOTE — ASSESSMENT & PLAN NOTE
Increased lately, using Tums and omeprazole.  Would recommend Omeprazole daily for 3 months then stop.  Watch for changes after that.    Orders:  •  omeprazole (PriLOSEC) 20 mg delayed release capsule; Take 1 capsule (20 mg total) by mouth daily before breakfast

## 2024-12-30 NOTE — PROGRESS NOTES
Virtual Regular Visit  Name: Devi Alarcon      : 2000      MRN: [id number]  Encounter Provider: Andreas Hardy MD  Encounter Date: 2024   Encounter department: [facility] PRIMARY [facility]      Verification of patient location:  Patient is located at Home in the following state in which I hold an active license [place] :  Assessment & Plan  Generalized anxiety disorder  Stable with using the Wellbutrin.  No specific change with this.       Thyroid cyst  Follow with endocrinology at [facility]       Overweight (BMI 25.0-29.9)  {Patient does have some significant issues with regard to obesity/overweight.  Unfortunately, she was not able to tolerate naltrexone.  This suggest that she would not do well with that combination with Wellbutrin.  Since the Wellbutrin is working very well for anxiety and depression, I would not recommend a change.  I did recommend that she follow-up with bariatric program at [facility], as that is much closer to her home.    Orders:  •  Ambulatory Referral to Weight Management; Future    Gastroesophageal reflux disease, unspecified whether esophagitis present  Increased lately, using Tums and omeprazole.  Would recommend Omeprazole daily for 3 months then stop.  Watch for changes after that.    Orders:  •  omeprazole (PriLOSEC) 20 mg delayed release capsule; Take 1 capsule (20 mg total) by mouth daily before breakfast    Other migraine without status migrainosus, not intractable  Migraines seem to be worse than prior.  9 does not last for the month as far as the rizatriptan.  It does work quite well when she uses it, but she needs to use all 9 every month.  Will add amitriptyline as she was not able to tolerate Lexapro.  If that does not work, would consider follow-up with neurology.  Orders:  •  amitriptyline (ELAVIL) 25 mg tablet; Take 1 tablet (25 mg total) by mouth daily at bedtime    Hypoglycemia, unspecified  Stable.  Follow with endocrinology as  scheduled.           Encounter provider Andreas Hardy MD    The patient was identified by name and date of birth. Devi Alarcon was informed that this is a telemedicine visit and that the visit is being conducted through the Epic Embedded platform. She agrees to proceed..  My office door was closed. No one else was in the room.  She acknowledged consent and understanding of privacy and security of the video platform. The patient has agreed to participate and understands they can discontinue the visit at any time.    Patient is aware this is a billable service.       1. Generalized anxiety disorder  2. Thyroid cyst  3. Overweight (BMI 25.0-29.9)  -     Ambulatory Referral to Weight Management; Future  4. Gastroesophageal reflux disease, unspecified whether esophagitis present  Assessment & Plan:  Increased lately, using Tums and omeprazole.  Would recommend Omeprazole daily for 3 months then stop.  Watch for changes after that.  Orders:  -     omeprazole (PriLOSEC) 20 mg delayed release capsule; Take 1 capsule (20 mg total) by mouth daily before breakfast  5. Other migraine without status migrainosus, not intractable  -     amitriptyline (ELAVIL) 25 mg tablet; Take 1 tablet (25 mg total) by mouth daily at bedtime  6. Hypoglycemia, unspecified      Chief Complaint   Patient presents with   • Virtual Regular Visit         History of Present Illness     Increased sedation with Naltrexone.  Has been trying to eat better. Not really exercising.    GERD: New lately.  Has been using Omeprazole/ Tums.  Seems to happen even more with warm water, especially at night.    Migraine: Using Maxalt.  Helps. Seems to need more often.  Is not lasting for a month.    Obesity: Went off Nadia.  Did not seem to agree with her.  Too much sedation with that.    Hypoglycemia: Stable.  Has been seeing endocrinology at [place].    Thyroid cyst noted before.  Again, following with endocrinology.    Anxiety: Using Wellbutrin.   This does seem to be helping her quite a bit.      Review of Systems   Constitutional: Negative.    HENT: Negative.     Eyes: Negative.    Respiratory: Negative.     Cardiovascular: Negative.    Gastrointestinal: Negative.    Endocrine: Negative.    Genitourinary: Negative.    Musculoskeletal: Negative.    Skin: Negative.    Allergic/Immunologic: Negative.    Neurological: Negative.    Hematological: Negative.    Psychiatric/Behavioral: Negative.         Objective   Wt 85.1 kg (187 lb 11.2 oz) Comment: home  BMI 30.76 kg/m²     Physical Exam  Nursing note reviewed.   Constitutional:       General: She is not in acute distress.     Appearance: She is well-developed. She is not ill-appearing.   HENT:      Head: Normocephalic and atraumatic.   Eyes:      Conjunctiva/sclera: Conjunctivae normal.      Pupils: Pupils are equal, round, and reactive to light.   Pulmonary:      Effort: Pulmonary effort is normal.      Breath sounds: Normal breath sounds.         Visit Time  Total Visit Duration: 20

## 2024-12-30 NOTE — ASSESSMENT & PLAN NOTE
Migraines seem to be worse than prior.  9 does not last for the month as far as the rizatriptan.  It does work quite well when she uses it, but she needs to use all 9 every month.  Will add amitriptyline as she was not able to tolerate Lexapro.  If that does not work, would consider follow-up with neurology.  Orders:  •  amitriptyline (ELAVIL) 25 mg tablet; Take 1 tablet (25 mg total) by mouth daily at bedtime

## 2024-12-30 NOTE — PATIENT INSTRUCTIONS
1. Generalized anxiety disorder  Assessment & Plan:  Stable with using the Wellbutrin.  No specific change with this.       2. Thyroid cyst  Assessment & Plan:  Follow with endocrinology at [facility]       3. Overweight (BMI 25.0-29.9)  Assessment & Plan:  {Patient does have some significant issues with regard to obesity/overweight.  Unfortunately, she was not able to tolerate naltrexone.  This suggest that she would not do well with that combination with Wellbutrin.  Since the Wellbutrin is working very well for anxiety and depression, I would not recommend a change.  I did recommend that she follow-up with bariatric program at [facility], as that is much closer to her home.    Orders:    Ambulatory Referral to Weight Management; Future    Orders:  -     Ambulatory Referral to Weight Management; Future  4. Gastroesophageal reflux disease, unspecified whether esophagitis present  Assessment & Plan:  Increased lately, using Tums and omeprazole.  Would recommend Omeprazole daily for 3 months then stop.  Watch for changes after that.    Orders:    omeprazole (PriLOSEC) 20 mg delayed release capsule; Take 1 capsule (20 mg total) by mouth daily before breakfast    Orders:  -     omeprazole (PriLOSEC) 20 mg delayed release capsule; Take 1 capsule (20 mg total) by mouth daily before breakfast  5. Other migraine without status migrainosus, not intractable  Assessment & Plan:  Migraines seem to be worse than prior.  9 does not last for the month as far as the rizatriptan.  It does work quite well when she uses it, but she needs to use all 9 every month.  Will add amitriptyline as she was not able to tolerate Lexapro.  If that does not work, would consider follow-up with neurology.  Orders:    amitriptyline (ELAVIL) 25 mg tablet; Take 1 tablet (25 mg total) by mouth daily at bedtime    Orders:  -     amitriptyline (ELAVIL) 25 mg tablet; Take 1 tablet (25 mg total) by mouth daily at bedtime  6. Hypoglycemia,  unspecified  Assessment & Plan:  Stable.  Follow with endocrinology as scheduled.           COVID 19 Instructions    Devi Alarcon was advised to limit contact with others to essential tasks such as getting food, medications, and medical care.    Proper handwashing reviewed, and Hand sanitzer when washing is not available.    If the patient develops symptoms of COVID 19, the patient should call the office as soon as possible.    It is strongly recommended that Flu Vaccinations be obtained.      Virtual Visits:  Emily: This works on smart phones (any phone with Internet browsing capability).  You should get a text message when the provider is ready to see you.  Click on the link in the text message, and the call should start.  You will need to type in your name, and allow camera and microphone access.  This is HIPPA compliant, and secure.      If you have not already done so, get immunized to COVID 19.      We are committed to getting you vaccinated as soon as possible and will be closely following [facility] guidelines as they are released and revised.  Please refer to our COVID-19 vaccine webpage for the most up to date information on the vaccine and our distribution efforts.    This site will also have the most up to date recommendations for COVID booster vaccine.    [email or web address]    Call [phone]-[phone] ([phone]), option 7    You can also visit https://www.vaccines.gov/ to find vaccines in your area.    OUR LOCATION:    [facility]  [address] [place] [address], [address]  [place], [place], [address] [phone]  Fax: [phone]    Lab services, Rheumatology, and OB/GYN are at this location as well.

## 2025-01-02 ENCOUNTER — TELEPHONE (OUTPATIENT)
Age: 25
End: 2025-01-02

## 2025-01-02 NOTE — TELEPHONE ENCOUNTER
Patient called in and stated that she needs a insurance referral faxed over to the [facility] to set up a bariatric appt.     Diagnostic codes - E66.3 (ICD-10-CM) - Overweight (BMI 25.0-29.9)     Please fax to  when Referral is done.    Thank you

## 2025-01-21 DIAGNOSIS — G43.809 OTHER MIGRAINE WITHOUT STATUS MIGRAINOSUS, NOT INTRACTABLE: ICD-10-CM

## 2025-01-21 NOTE — TELEPHONE ENCOUNTER
Called pt and left an vm in regards that pt medications have been refilled, pt will need to schedule an appt as needed for next refill.     If pt calls back please schedule as needed.     Thank you.   Tonja.

## 2025-02-06 ENCOUNTER — TELEPHONE (OUTPATIENT)
Age: 25
End: 2025-02-06

## 2025-02-06 DIAGNOSIS — E66.9 OBESITY (BMI 30-39.9): Primary | ICD-10-CM

## 2025-02-06 RX ORDER — TIRZEPATIDE 2.5 MG/.5ML
2.5 INJECTION, SOLUTION SUBCUTANEOUS WEEKLY
Qty: 2 ML | Refills: 0 | Status: SHIPPED | OUTPATIENT
Start: 2025-02-06

## 2025-02-06 RX ORDER — TIRZEPATIDE 10 MG/.5ML
10 INJECTION, SOLUTION SUBCUTANEOUS WEEKLY
Qty: 2 ML | Refills: 0 | Status: SHIPPED | OUTPATIENT
Start: 2025-05-01 | End: 2025-05-29

## 2025-02-06 RX ORDER — TIRZEPATIDE 12.5 MG/.5ML
12.5 INJECTION, SOLUTION SUBCUTANEOUS WEEKLY
Qty: 2 ML | Refills: 0 | Status: SHIPPED | OUTPATIENT
Start: 2025-05-29 | End: 2025-06-26

## 2025-02-06 RX ORDER — TIRZEPATIDE 7.5 MG/.5ML
7.5 INJECTION, SOLUTION SUBCUTANEOUS WEEKLY
Qty: 2 ML | Refills: 0 | Status: SHIPPED | OUTPATIENT
Start: 2025-04-03 | End: 2025-05-01

## 2025-02-06 RX ORDER — TIRZEPATIDE 5 MG/.5ML
5 INJECTION, SOLUTION SUBCUTANEOUS WEEKLY
Qty: 2 ML | Refills: 0 | Status: SHIPPED | OUTPATIENT
Start: 2025-03-06 | End: 2025-04-03

## 2025-02-06 RX ORDER — TIRZEPATIDE 15 MG/.5ML
15 INJECTION, SOLUTION SUBCUTANEOUS WEEKLY
Qty: 6 ML | Refills: 0 | Status: SHIPPED | OUTPATIENT
Start: 2025-06-26

## 2025-02-06 NOTE — TELEPHONE ENCOUNTER
1. Obesity (BMI 30-39.9)  -     Zepbound 2.5 MG/0.5ML auto-injector; Inject 0.5 mL (2.5 mg total) under the skin once a week  -     Zepbound 5 MG/0.5ML auto-injector; Inject 0.5 mL (5 mg total) under the skin once a week for 28 days Do not start before March 6, 2025.  -     Zepbound 7.5 MG/0.5ML auto-injector; Inject 0.5 mL (7.5 mg total) under the skin once a week for 28 days Do not start before April 3, 2025.  -     Zepbound 10 MG/0.5ML auto-injector; Inject 0.5 mL (10 mg total) under the skin once a week for 28 days Do not start before May 1, 2025.  -     Zepbound 12.5 MG/0.5ML auto-injector; Inject 0.5 mL (12.5 mg total) under the skin once a week for 28 days Do not start before May 29, 2025.  -     Zepbound 15 MG/0.5ML auto-injector; Inject 0.5 mL (15 mg total) under the skin once a week Do not start before June 26, 2025.

## 2025-02-06 NOTE — TELEPHONE ENCOUNTER
Patient called in and would like to go on the shot for weight loss - Patient also has an appt for bariatric in March.     Please send script to [facility] pharmacy on file.    Thank you

## 2025-02-06 NOTE — TELEPHONE ENCOUNTER
Pt is returning MD's call. Please advise [phone]    Pt was told by her insurance that they recd the PA request and sent over questions in the portal for her doctor to answer. I explained to the pt that the PA dept is separate from us and we would have to wait for them to send us the questions to see what they are. I also informed pt that we would contact her once PA is approved so she can  her medicine. Pt. understood

## 2025-02-06 NOTE — TELEPHONE ENCOUNTER
Patient saw her endocrinologist and they stated she is prediabetic and would be a good candidate for Zepbound. She had discussed taking it with Dr Hardy. She has a weight management appt scheduled in March, but wanted to know if the med could be prescribed prior to that appt.

## 2025-02-07 ENCOUNTER — TELEPHONE (OUTPATIENT)
Age: 25
End: 2025-02-07

## 2025-02-07 NOTE — TELEPHONE ENCOUNTER
PA for Zepbound APPROVED     Date(s) approved [id number]     Case #[id number]/25    Patient advised by          [x][employer] Message  []Phone call   []LMOM  []L/M to call office as no active Communication consent on file  []Unable to leave detailed message as VM not approved on Communication consent       Pharmacy advised by    [x]Fax  []Phone call    Approval letter scanned into Media Yes

## 2025-02-07 NOTE — TELEPHONE ENCOUNTER
[place] for Zepbound APPROVED      Date(s) approved [id number]      Case #[id number]/7/25     Patient advised by           [x][employer] Message  []Phone call   []LMOM  []L/M to call office as no active Communication consent on file  []Unable to leave detailed message as VM not approved on Communication consent                                        Pharmacy advised by     [x]Fax  []Phone call     Approval letter scanned into Media Yes

## 2025-02-07 NOTE — TELEPHONE ENCOUNTER
PA for Zepbound SUBMITTED to [employer]    via    []CMM-KEY:    [x]Surescripts-Case ID # [id number]   []Availity-Auth ID #  NDC #    []Faxed to plan   []Other website    []Phone call Case ID #      []PA sent as URGENT    All office notes, labs and other pertaining documents and studies sent. Clinical questions answered. Awaiting determination from insurance company.     Turnaround time for your insurance to make a decision on your Prior Authorization can take 7-21 business days.

## 2025-02-07 NOTE — TELEPHONE ENCOUNTER
Reason for call:   [x] Prior Auth  [] Other:     Caller:  [x] Patient  [] Pharmacy  Name:   Address:   Callback Number:     Medication: Zepbound 2.5 MG/0.5ML auto-injector     Dose/Frequency: Inject 0.5 mL (2.5 mg total) under the skin once a week     Quantity: 2 mL     Ordering Provider:   [x] PCP/Provider - Andreas Hardy MD   [] Speciality/Provider -     Has the patient tried other medications and failed? If failed, which medications did they fail?    [] No   [x] Yes - oral weight loss medication that did not work due to blood sugar issues per patient     Is the patient's insurance updated in EPIC?   [x] Yes   [] No     Is a copy of the patient's insurance scanned in EPIC?   [x] Yes  10/31/24 in media  [] No

## 2025-02-11 ENCOUNTER — CLINICAL SUPPORT (OUTPATIENT)
Dept: OBGYN CLINIC | Facility: MEDICAL CENTER | Age: 25
End: 2025-02-11
Payer: COMMERCIAL

## 2025-02-11 DIAGNOSIS — Z30.42 ENCOUNTER FOR MANAGEMENT AND INJECTION OF DEPO-PROVERA: Primary | ICD-10-CM

## 2025-02-11 PROCEDURE — 96372 THER/PROPH/DIAG INJ SC/IM: CPT

## 2025-02-11 RX ADMIN — MEDROXYPROGESTERONE ACETATE 150 MG: 150 INJECTION, SUSPENSION INTRAMUSCULAR at 13:30

## 2025-02-11 NOTE — PROGRESS NOTES
Patient presents for Depo-Provera injection.  Patient supplied medication.  Patient tolerated IM injection ,left deltoid.  Denied questions or concerns at conclusion of conversation.  Aware repeat injection is due in 3 months.  Encouraged to schedule prior to leaving office at today's visit.    NDC;35607-559-57  Lot # [id number]  Expiration:12/31/2026

## 2025-02-18 DIAGNOSIS — F41.1 GENERALIZED ANXIETY DISORDER: ICD-10-CM

## 2025-02-19 RX ORDER — BUPROPION HYDROCHLORIDE 300 MG/1
300 TABLET ORAL EVERY MORNING
Qty: 90 TABLET | Refills: 1 | OUTPATIENT
Start: 2025-02-19 | End: 2025-08-18

## 2025-02-21 ENCOUNTER — TELEPHONE (OUTPATIENT)
Age: 25
End: 2025-02-21

## 2025-02-21 NOTE — TELEPHONE ENCOUNTER
Patient on depo - last injection 2/11/25. First depo given 11/26/24. Patient started taking Spironolactone and Zepound 3 weeks ago. Immediately after starting patient began having on and off vaginal spotting. Patient unsure if could be from starting new medications or if related to breakthrough bleeding with depo injection. Spotting has been ongoing x3 weeks.     Routing to provider for review.

## 2025-03-12 ENCOUNTER — TELEPHONE (OUTPATIENT)
Age: 25
End: 2025-03-12

## 2025-03-12 DIAGNOSIS — E66.9 OBESITY (BMI 30-39.9): ICD-10-CM

## 2025-03-12 NOTE — TELEPHONE ENCOUNTER
Patient called concerned because she increased her ZEPBOUND dosage on Friday, and has been having some unpleasant side effects.    She claims that she is having stomach pains, nausea, diarrhea, and passing blood in stool as well.    She asks if PCP thinks this will stop, or should she go back to the lesser dose, or take something to alleviate the symptoms.     Please advise and call patient @[phone]

## 2025-03-13 ENCOUNTER — TELEMEDICINE (OUTPATIENT)
Dept: FAMILY MEDICINE CLINIC | Facility: CLINIC | Age: 25
End: 2025-03-13
Payer: COMMERCIAL

## 2025-03-13 VITALS — WEIGHT: 173 LBS | HEIGHT: 66 IN | BODY MASS INDEX: 27.8 KG/M2

## 2025-03-13 DIAGNOSIS — K52.9 COLITIS: ICD-10-CM

## 2025-03-13 DIAGNOSIS — E66.811 CLASS 1 OBESITY DUE TO EXCESS CALORIES WITH SERIOUS COMORBIDITY AND BODY MASS INDEX (BMI) OF 30.0 TO 30.9 IN ADULT: Primary | ICD-10-CM

## 2025-03-13 DIAGNOSIS — K82.8 GALLBLADDER SLUDGE: ICD-10-CM

## 2025-03-13 DIAGNOSIS — E66.09 CLASS 1 OBESITY DUE TO EXCESS CALORIES WITH SERIOUS COMORBIDITY AND BODY MASS INDEX (BMI) OF 30.0 TO 30.9 IN ADULT: Primary | ICD-10-CM

## 2025-03-13 DIAGNOSIS — N20.0 NEPHROLITHIASIS: ICD-10-CM

## 2025-03-13 PROCEDURE — 99214 OFFICE O/P EST MOD 30 MIN: CPT | Performed by: FAMILY MEDICINE

## 2025-03-13 RX ORDER — ONDANSETRON 4 MG/1
4 TABLET, ORALLY DISINTEGRATING ORAL EVERY 8 HOURS PRN
COMMUNITY
Start: 2025-03-12 | End: 2025-03-14

## 2025-03-13 RX ORDER — TIRZEPATIDE 2.5 MG/.5ML
2.5 INJECTION, SOLUTION SUBCUTANEOUS WEEKLY
Qty: 2 ML | Refills: 0 | Status: SHIPPED | OUTPATIENT
Start: 2025-03-13 | End: 2025-04-10

## 2025-03-13 RX ORDER — SIMETHICONE 80 MG
80 TABLET,CHEWABLE ORAL EVERY 6 HOURS PRN
COMMUNITY
Start: 2025-03-12 | End: 2025-03-22

## 2025-03-13 RX ORDER — CIPROFLOXACIN 500 MG/1
1 TABLET, FILM COATED ORAL 2 TIMES DAILY
COMMUNITY
Start: 2025-01-05 | End: 2025-03-13 | Stop reason: ALTCHOICE

## 2025-03-13 RX ORDER — TIRZEPATIDE 2.5 MG/.5ML
2.5 INJECTION, SOLUTION SUBCUTANEOUS WEEKLY
Qty: 2 ML | Refills: 0 | OUTPATIENT
Start: 2025-03-13

## 2025-03-13 NOTE — PATIENT INSTRUCTIONS
1. Class 1 obesity due to excess calories with serious comorbidity and body mass index (BMI) of 30.0 to 30.9 in adult  Comments:  Will restart patient on 2.5 Zepbound.  Has had significant weight loss with that.  No side effects.  Watch for changes.  Orders:  -     tirzepatide (Zepbound) 2.5 mg/0.5 mL auto-injector; Inject 0.5 mL (2.5 mg total) under the skin once a week for 28 days  2. Colitis  Assessment & Plan:  Colitis was noted in the hospital on CT scan.  They did not recommend any antibiotics at that point.  Will follow in the future as needed.  Would recommend repeat CT scan at some point, or follow-up with gastroenterology.  Orders:  -     CT abdomen pelvis w contrast; Future; Expected date: 06/13/2025  3. Nephrolithiasis  Assessment & Plan:  ER CT showed multiple stones.  May have been part of her symptoms.  Encourage increase water intake. Consider Urology follow up.    Orders:  -     CT abdomen pelvis w contrast; Future; Expected date: 06/13/2025  4. Gallbladder sludge  Assessment & Plan:  Noted on CT abdomen in ER.  Follow up IF increased pain in RUQ.    Orders:  -     CT abdomen pelvis w contrast; Future; Expected date: 06/13/2025      COVID 19 Instructions    Devi Alarcon was advised to limit contact with others to essential tasks such as getting food, medications, and medical care.    Proper handwashing reviewed, and Hand sanitzer when washing is not available.    If the patient develops symptoms of COVID 19, the patient should call the office as soon as possible.    It is strongly recommended that Flu Vaccinations be obtained.      Virtual Visits:  Emily: This works on smart phones (any phone with Internet browsing capability).  You should get a text message when the provider is ready to see you.  Click on the link in the text message, and the call should start.  You will need to type in your name, and allow camera and microphone access.  This is HIPPA compliant, and secure.      If you have  not already done so, get immunized to COVID 19.      We are committed to getting you vaccinated as soon as possible and will be closely following [facility] and [facility] guidelines as they are released and revised.  Please refer to our COVID-19 vaccine webpage for the most up to date information on the vaccine and our distribution efforts.    This site will also have the most up to date recommendations for COVID booster vaccine.    [email or web address]    Call [phone] ([phone]), option 7    You can also visit https://www.vaccines.gov/ to find vaccines in your area.    OUR LOCATION:    [facility]  [address] [place] [address], [address]  [place], [place], [address] [phone]  Fax: [phone]    Lab services, Rheumatology, and OB/GYN are at this location as well.

## 2025-03-13 NOTE — ASSESSMENT & PLAN NOTE
Noted on CT abdomen in ER.  Follow up IF increased pain in RUQ.    Orders:  •  CT abdomen pelvis w contrast; Future

## 2025-03-13 NOTE — PROGRESS NOTES
Virtual Regular VisitName: Devi Alarcon      : 2000      MRN: [id number]  Encounter Provider: Andreas Hardy MD  Encounter Date: 3/13/2025   Encounter department: [facility]  :  Assessment & Plan  Class 1 obesity due to excess calories with serious comorbidity and body mass index (BMI) of 30.0 to 30.9 in adult    Orders:  •  tirzepatide (Zepbound) 2.5 mg/0.5 mL auto-injector; Inject 0.5 mL (2.5 mg total) under the skin once a week for 28 days    Colitis  Colitis was noted in the hospital on CT scan.  They did not recommend any antibiotics at that point.  Will follow in the future as needed.  Would recommend repeat CT scan at some point, or follow-up with gastroenterology.  Orders:  •  CT abdomen pelvis w contrast; Future    Nephrolithiasis  ER CT showed multiple stones.  May have been part of her symptoms.  Encourage increase water intake. Consider Urology follow up.    Orders:  •  CT abdomen pelvis w contrast; Future    Gallbladder sludge  Noted on CT abdomen in ER.  Follow up IF increased pain in RUQ.    Orders:  •  CT abdomen pelvis w contrast; Future        1. Class 1 obesity due to excess calories with serious comorbidity and body mass index (BMI) of 30.0 to 30.9 in adult  Comments:  Will restart patient on 2.5 Zepbound.  Has had significant weight loss with that.  No side effects.  Watch for changes.  Orders:  -     tirzepatide (Zepbound) 2.5 mg/0.5 mL auto-injector; Inject 0.5 mL (2.5 mg total) under the skin once a week for 28 days  2. Colitis  Assessment & Plan:  Colitis was noted in the hospital on CT scan.  They did not recommend any antibiotics at that point.  Will follow in the future as needed.  Would recommend repeat CT scan at some point, or follow-up with gastroenterology.  Orders:  •  CT abdomen pelvis w contrast; Future    Orders:  -     CT abdomen pelvis w contrast; Future; Expected date: 2025  3. Nephrolithiasis  Assessment & Plan:  ER CT showed  "multiple stones.  May have been part of her symptoms.  Encourage increase water intake. Consider Urology follow up.    Orders:  •  CT abdomen pelvis w contrast; Future    Orders:  -     CT abdomen pelvis w contrast; Future; Expected date: 06/13/2025  4. Gallbladder sludge  Assessment & Plan:  Noted on CT abdomen in ER.  Follow up IF increased pain in RUQ.    Orders:  •  CT abdomen pelvis w contrast; Future    Orders:  -     CT abdomen pelvis w contrast; Future; Expected date: 06/13/2025      Chief Complaint   Patient presents with   • Virtual Brief Visit     ED follow up. [facility] Emergency Department    • Virtual Regular Visit           History of Present Illness     Had nausea and vomiting.  Went to ER.  Was thought to be Zepbound related.    Reviewed the documents from the ER via care everywhere, including the CT scan which showed gallbladder sludge, punctate kidney stones on the left.  There was also colitis noted on the CT scan.    At this point, the patient is feeling much better.  She wondered about how to go about getting the lower dose of Zepbound, i.e. 2.5.    With the guard to the Zepbound and obesity, that seems to be working quite well for her.  On 2.5 she has had a significant amount of weight loss.        Review of Systems   Constitutional: Negative.    HENT: Negative.     Eyes: Negative.    Respiratory: Negative.     Cardiovascular: Negative.    Gastrointestinal: Negative.    Endocrine: Negative.    Genitourinary: Negative.    Musculoskeletal: Negative.    Skin: Negative.    Allergic/Immunologic: Negative.    Neurological: Negative.    Hematological: Negative.    Psychiatric/Behavioral: Negative.         Objective   Ht 5' 5.5\" (1.664 m)   Wt 78.5 kg (173 lb)   BMI 28.35 kg/m²     Physical Exam  Nursing note reviewed.   Constitutional:       General: She is not in acute distress.     Appearance: She is well-developed. She is not ill-appearing.   HENT:      Head: Normocephalic and atraumatic. "   Eyes:      Conjunctiva/sclera: Conjunctivae normal.      Pupils: Pupils are equal, round, and reactive to light.   Pulmonary:      Effort: Pulmonary effort is normal.      Breath sounds: Normal breath sounds.         Administrative Statements   Encounter provider Andreas Hardy MD    The Patient is located at Home and in the following state in which I hold an active license [place].    The patient was identified by name and date of birth. Devi Alarcon was informed that this is a telemedicine visit and that the visit is being conducted through the Epic Embedded platform. She agrees to proceed..  My office door was closed. The patient was notified the following individuals were present in the room Neel student..  She acknowledged consent and understanding of privacy and security of the video platform. The patient has agreed to participate and understands they can discontinue the visit at any time.    I have spent a total time of 20 minutes in caring for this patient on the day of the visit/encounter including Diagnostic results, Prognosis, Risks and benefits of tx options, Instructions for management, Patient and family education, Importance of tx compliance, Risk factor reductions, Impressions, Counseling / Coordination of care, Documenting in the medical record, Reviewing/placing orders in the medical record (including tests, medications, and/or procedures), Obtaining or reviewing history  , and Communicating with other healthcare professionals , not including the time spent for establishing the audio/video connection.

## 2025-03-13 NOTE — TELEPHONE ENCOUNTER
Spoke to patient she was advised of the recommendations. Patient did state she was int the ER yesterday and they did some lab works and there were some findings she will like to discuss with you during virtual consult today at 1pm. Patient is ok with restarting the zepbound 2.5MG. She will need a new script sent to pharmacy.

## 2025-03-13 NOTE — ASSESSMENT & PLAN NOTE
Colitis was noted in the hospital on CT scan.  They did not recommend any antibiotics at that point.  Will follow in the future as needed.  Would recommend repeat CT scan at some point, or follow-up with gastroenterology.  Orders:  •  CT abdomen pelvis w contrast; Future

## 2025-03-13 NOTE — ASSESSMENT & PLAN NOTE
ER CT showed multiple stones.  May have been part of her symptoms.  Encourage increase water intake. Consider Urology follow up.    Orders:  •  CT abdomen pelvis w contrast; Future

## 2025-03-13 NOTE — TELEPHONE ENCOUNTER
I would definitely decrease to the lower dose for the next 1.  With the rectal bleeding, we definitely need to have further evaluation.

## 2025-03-18 DIAGNOSIS — G43.809 OTHER MIGRAINE WITHOUT STATUS MIGRAINOSUS, NOT INTRACTABLE: ICD-10-CM

## 2025-03-19 RX ORDER — RIZATRIPTAN BENZOATE 10 MG/1
TABLET, ORALLY DISINTEGRATING ORAL
Qty: 9 TABLET | Refills: 2 | Status: SHIPPED | OUTPATIENT
Start: 2025-03-19

## 2025-03-25 ENCOUNTER — NURSE TRIAGE (OUTPATIENT)
Age: 25
End: 2025-03-25

## 2025-03-25 DIAGNOSIS — B37.31 VAGINAL YEAST INFECTION: Primary | ICD-10-CM

## 2025-03-25 RX ORDER — FLUCONAZOLE 150 MG/1
150 TABLET ORAL 2 TIMES WEEKLY
Qty: 2 TABLET | Refills: 0 | Status: SHIPPED | OUTPATIENT
Start: 2025-03-25 | End: 2025-04-01

## 2025-03-25 NOTE — TELEPHONE ENCOUNTER
Per [employer]- She was last treated 10/2024.  She does not necessarily need an appointment for reoccurring infections.  I sent in fluconazole 1 tablet with a recommendation to  repeat in 3 days if symptoms are not completely resolved. Please review hygiene recommendations-avoid scented soaps, lotions and lubricants might: Avoid tight/restrictive clothing; avoid douching; partner should avoid heavily scented/antibacterial soaps on genitals.  Daily probiotic may help decrease episodes.   11:30 left vmm to call office.   MyChart msg also sent.

## 2025-03-25 NOTE — TELEPHONE ENCOUNTER
FOLLOW UP: routing to provider    REASON FOR CONVERSATION: Romansh    SYMPTOMS: thick white vaginal discharge and itching    OTHER: patient complains of thick white vaginal discharge and vaginal itching x3 days. Has history of recurrent yeast infections (4 in the last year). Patient scheduled 4/8. Routing to provider for possible treatment.     DISPOSITION: See Within 3 Days in Office

## 2025-03-25 NOTE — TELEPHONE ENCOUNTER
"RN, please see the thread in patient MYC msg encounter to determine if script is appropriate for Italian  \"Just the regular thicker white discharge & itching \"  Last seen in office 9/9/24    Thank you   "

## 2025-03-25 NOTE — TELEPHONE ENCOUNTER
"Reason for Disposition  • 4 or more episodes of vaginal infection in past year    Answer Assessment - Initial Assessment Questions  1. DISCHARGE: \"Describe the discharge.\" (e.g., white, yellow, green, gray, foamy, cottage cheese-like)      Thick white  2. ODOR: \"Is there a bad odor?\"      denies  3. ONSET: \"When did the discharge begin?\"      3 days ago  4. RASH: \"Is there a rash in the genital area?\" If Yes, ask: \"Describe it.\" (e.g., redness, blisters, sores, bumps)      denies  5. ABDOMEN PAIN: \"Are you having any abdomen pain?\" If Yes, ask: \"What does it feel like? \" (e.g., crampy, dull, intermittent, constant)       denies  6. ABDOMEN PAIN SEVERITY: If present, ask: \"How bad is it?\" (e.g., Scale 1-10; mild, moderate, or severe)      N/a  7. CAUSE: \"What do you think is causing the discharge?\" \"Have you had the same problem before?\" \"What happened then?\"      Yeast infection  8. OTHER SYMPTOMS: \"Do you have any other symptoms?\" (e.g., fever, itching, vaginal bleeding, pain with urination, injury to genital area, vaginal foreign body)      Itching   9. PREGNANCY: \"Is there any chance you are pregnant?\" \"When was your last menstrual period?\"      LMP ---On depo..unsure    Protocols used: Vaginal Discharge-Adult-[place]    "

## 2025-03-30 DIAGNOSIS — K21.9 GASTROESOPHAGEAL REFLUX DISEASE, UNSPECIFIED WHETHER ESOPHAGITIS PRESENT: ICD-10-CM

## 2025-03-31 DIAGNOSIS — F41.1 GENERALIZED ANXIETY DISORDER: ICD-10-CM

## 2025-03-31 RX ORDER — OMEPRAZOLE 20 MG/1
20 CAPSULE, DELAYED RELEASE ORAL
Qty: 30 CAPSULE | Refills: 5 | Status: SHIPPED | OUTPATIENT
Start: 2025-03-31

## 2025-04-01 RX ORDER — BUPROPION HYDROCHLORIDE 300 MG/1
300 TABLET ORAL EVERY MORNING
Qty: 90 TABLET | Refills: 1 | Status: SHIPPED | OUTPATIENT
Start: 2025-04-01 | End: 2025-04-03 | Stop reason: SDUPTHER

## 2025-04-02 ENCOUNTER — RESULTS FOLLOW-UP (OUTPATIENT)
Dept: FAMILY MEDICINE CLINIC | Facility: CLINIC | Age: 25
End: 2025-04-02

## 2025-04-03 ENCOUNTER — TELEMEDICINE (OUTPATIENT)
Dept: FAMILY MEDICINE CLINIC | Facility: CLINIC | Age: 25
End: 2025-04-03
Payer: COMMERCIAL

## 2025-04-03 VITALS — WEIGHT: 168 LBS | BODY MASS INDEX: 27.53 KG/M2

## 2025-04-03 DIAGNOSIS — F41.1 GENERALIZED ANXIETY DISORDER: ICD-10-CM

## 2025-04-03 DIAGNOSIS — E66.811 CLASS 1 OBESITY DUE TO EXCESS CALORIES WITH SERIOUS COMORBIDITY AND BODY MASS INDEX (BMI) OF 30.0 TO 30.9 IN ADULT: ICD-10-CM

## 2025-04-03 DIAGNOSIS — K21.9 GASTROESOPHAGEAL REFLUX DISEASE, UNSPECIFIED WHETHER ESOPHAGITIS PRESENT: ICD-10-CM

## 2025-04-03 DIAGNOSIS — F31.70 BIPOLAR DISORDER IN FULL REMISSION, MOST RECENT EPISODE UNSPECIFIED TYPE (HCC): Primary | ICD-10-CM

## 2025-04-03 DIAGNOSIS — E66.09 CLASS 1 OBESITY DUE TO EXCESS CALORIES WITH SERIOUS COMORBIDITY AND BODY MASS INDEX (BMI) OF 30.0 TO 30.9 IN ADULT: ICD-10-CM

## 2025-04-03 DIAGNOSIS — G43.809 OTHER MIGRAINE WITHOUT STATUS MIGRAINOSUS, NOT INTRACTABLE: ICD-10-CM

## 2025-04-03 DIAGNOSIS — J30.9 ALLERGIC RHINITIS, UNSPECIFIED SEASONALITY, UNSPECIFIED TRIGGER: ICD-10-CM

## 2025-04-03 PROCEDURE — 99214 OFFICE O/P EST MOD 30 MIN: CPT | Performed by: FAMILY MEDICINE

## 2025-04-03 RX ORDER — BUPROPION HYDROCHLORIDE 300 MG/1
300 TABLET ORAL EVERY MORNING
Start: 2025-04-03 | End: 2025-09-30

## 2025-04-03 RX ORDER — ARIPIPRAZOLE 2 MG/1
2 TABLET ORAL DAILY
Qty: 30 TABLET | Refills: 0 | Status: SHIPPED | OUTPATIENT
Start: 2025-04-03

## 2025-04-03 NOTE — ASSESSMENT & PLAN NOTE
Less migraines lately.  She has been using the Maxalt, but only as needed.  Continue as needed.

## 2025-04-03 NOTE — PATIENT INSTRUCTIONS
1. Bipolar disorder in full remission, most recent episode unspecified type (HCC)  Assessment & Plan:  Patient does have a history of bipolar listed.  She does have some episodes of surjit, as well as cyclothymia.  Recommend trial of Abilify.  If that plus the Wellbutrin for anxiety has not improved things, would consider follow-up with psychiatry.  She is currently off the amitriptyline, did not seem to make a difference with sleep nor with anxiety and depression.  Orders:    ARIPiprazole (ABILIFY) 2 mg tablet; Take 1 tablet (2 mg total) by mouth daily    Orders:  -     ARIPiprazole (ABILIFY) 2 mg tablet; Take 1 tablet (2 mg total) by mouth daily  2. Class 1 obesity due to excess calories with serious comorbidity and body mass index (BMI) of 30.0 to 30.9 in adult  Comments:  Will restart patient on 2.5 Zepbound.  Has had significant weight loss with that.  No side effects.  Watch for changes.  Assessment & Plan:    Patient will continue on the Zepbound.  She has some 5 mg pens at home, recommend that she start that.  When she has 1 pen left, we can then send the 5 mg to her pharmacy, follow-up in approximately 2 months.  She is due for dose tomorrow, would recommend that she use the 5 mg dose tomorrow..         3. Gastroesophageal reflux disease, unspecified whether esophagitis present  Assessment & Plan:  Patient has had improvement in her reflux symptoms.  She has been on the omeprazole for about 2 to 3 months.  It is reasonable for her to discontinue or at least use as needed.       4. Generalized anxiety disorder  Assessment & Plan:  Has been OK with not using the Wellbutrin.  Has been noting some hives when increased anxiety.  Would recommend use the Wellbutrin daily, as well as adding Abilify.  Orders:    buPROPion (WELLBUTRIN XL) 300 mg 24 hr tablet; Take 1 tablet (300 mg total) by mouth every morning    Orders:  -     buPROPion (WELLBUTRIN XL) 300 mg 24 hr tablet; Take 1 tablet (300 mg total) by mouth every  morning  5. Other migraine without status migrainosus, not intractable  Assessment & Plan:  Less migraines lately.  She has been using the Maxalt, but only as needed.  Continue as needed.       6. Allergic rhinitis, unspecified seasonality, unspecified trigger  Assessment & Plan:  Agree with treatment for allergies.  OTC antihistamines.               COVID 19 Instructions    Devi Alarcon was advised to limit contact with others to essential tasks such as getting food, medications, and medical care.    Proper handwashing reviewed, and Hand sanitzer when washing is not available.    If the patient develops symptoms of COVID 19, the patient should call the office as soon as possible.    It is strongly recommended that Flu Vaccinations be obtained.      Virtual Visits:  Emily: This works on smart phones (any phone with Internet browsing capability).  You should get a text message when the provider is ready to see you.  Click on the link in the text message, and the call should start.  You will need to type in your name, and allow camera and microphone access.  This is HIPPA compliant, and secure.      If you have not already done so, get immunized to COVID 19.      We are committed to getting you vaccinated as soon as possible and will be closely following [facility] and [facility] guidelines as they are released and revised.  Please refer to our COVID-19 vaccine webpage for the most up to date information on the vaccine and our distribution efforts.    This site will also have the most up to date recommendations for COVID booster vaccine.    [email or web address]/covid-[email or web address]-vaccine    Call [phone] ([phone]), option 7    You can also visit https://www.vaccines.gov/ to find vaccines in your area.    OUR LOCATION:    [facility]  [facility] [facility], [address]  [place], [place], [address] [phone]  Fax: [phone]    Lab services, Rheumatology, and OB/GYN are at this  location as well.

## 2025-04-03 NOTE — ASSESSMENT & PLAN NOTE
Patient will continue on the Zepbound.  She has some 5 mg pens at home, recommend that she start that.  When she has 1 pen left, we can then send the 5 mg to her pharmacy, follow-up in approximately 2 months.  She is due for dose tomorrow, would recommend that she use the 5 mg dose tomorrow..

## 2025-04-03 NOTE — ASSESSMENT & PLAN NOTE
Patient has had improvement in her reflux symptoms.  She has been on the omeprazole for about 2 to 3 months.  It is reasonable for her to discontinue or at least use as needed.

## 2025-04-03 NOTE — ASSESSMENT & PLAN NOTE
Patient does have a history of bipolar listed.  She does have some episodes of surjit, as well as cyclothymia.  Recommend trial of Abilify.  If that plus the Wellbutrin for anxiety has not improved things, would consider follow-up with psychiatry.  She is currently off the amitriptyline, did not seem to make a difference with sleep nor with anxiety and depression.  Orders:  •  ARIPiprazole (ABILIFY) 2 mg tablet; Take 1 tablet (2 mg total) by mouth daily

## 2025-04-03 NOTE — PROGRESS NOTES
Virtual Regular VisitName: Devi Alarcon      : 2000      MRN: [id number]  Encounter Provider: Andreas Hardy MD  Encounter Date: 4/3/2025   Encounter department: [facility]  :  Assessment & Plan  Class 1 obesity due to excess calories with serious comorbidity and body mass index (BMI) of 30.0 to 30.9 in adult    Patient will continue on the Zepbound.  She has some 5 mg pens at home, recommend that she start that.  When she has 1 pen left, we can then send the 5 mg to her pharmacy, follow-up in approximately 2 months.  She is due for dose tomorrow, would recommend that she use the 5 mg dose tomorrow..         Gastroesophageal reflux disease, unspecified whether esophagitis present  Patient has had improvement in her reflux symptoms.  She has been on the omeprazole for about 2 to 3 months.  It is reasonable for her to discontinue or at least use as needed.       Generalized anxiety disorder  Has been OK with not using the Wellbutrin.  Has been noting some hives when increased anxiety.  Would recommend use the Wellbutrin daily, as well as adding Abilify.  Orders:  •  buPROPion (WELLBUTRIN XL) 300 mg 24 hr tablet; Take 1 tablet (300 mg total) by mouth every morning    Bipolar disorder in full remission, most recent episode unspecified type (HCC)  Patient does have a history of bipolar listed.  She does have some episodes of surjit, as well as cyclothymia.  Recommend trial of Abilify.  If that plus the Wellbutrin for anxiety has not improved things, would consider follow-up with psychiatry.  She is currently off the amitriptyline, did not seem to make a difference with sleep nor with anxiety and depression.  Orders:  •  ARIPiprazole (ABILIFY) 2 mg tablet; Take 1 tablet (2 mg total) by mouth daily    Other migraine without status migrainosus, not intractable  Less migraines lately.  She has been using the Maxalt, but only as needed.  Continue as needed.       Allergic rhinitis,  unspecified seasonality, unspecified trigger  Agree with treatment for allergies.  OTC antihistamines.               1. Bipolar disorder in full remission, most recent episode unspecified type (HCC)  Assessment & Plan:  Patient does have a history of bipolar listed.  She does have some episodes of surjit, as well as cyclothymia.  Recommend trial of Abilify.  If that plus the Wellbutrin for anxiety has not improved things, would consider follow-up with psychiatry.  She is currently off the amitriptyline, did not seem to make a difference with sleep nor with anxiety and depression.  Orders:  •  ARIPiprazole (ABILIFY) 2 mg tablet; Take 1 tablet (2 mg total) by mouth daily    Orders:  -     ARIPiprazole (ABILIFY) 2 mg tablet; Take 1 tablet (2 mg total) by mouth daily  2. Class 1 obesity due to excess calories with serious comorbidity and body mass index (BMI) of 30.0 to 30.9 in adult  Comments:  Will restart patient on 2.5 Zepbound.  Has had significant weight loss with that.  No side effects.  Watch for changes.  Assessment & Plan:    Patient will continue on the Zepbound.  She has some 5 mg pens at home, recommend that she start that.  When she has 1 pen left, we can then send the 5 mg to her pharmacy, follow-up in approximately 2 months.  She is due for dose tomorrow, would recommend that she use the 5 mg dose tomorrow..         3. Gastroesophageal reflux disease, unspecified whether esophagitis present  Assessment & Plan:  Patient has had improvement in her reflux symptoms.  She has been on the omeprazole for about 2 to 3 months.  It is reasonable for her to discontinue or at least use as needed.       4. Generalized anxiety disorder  Assessment & Plan:  Has been OK with not using the Wellbutrin.  Has been noting some hives when increased anxiety.  Would recommend use the Wellbutrin daily, as well as adding Abilify.  Orders:  •  buPROPion (WELLBUTRIN XL) 300 mg 24 hr tablet; Take 1 tablet (300 mg total) by mouth  every morning    Orders:  -     buPROPion (WELLBUTRIN XL) 300 mg 24 hr tablet; Take 1 tablet (300 mg total) by mouth every morning  5. Other migraine without status migrainosus, not intractable  Assessment & Plan:  Less migraines lately.  She has been using the Maxalt, but only as needed.  Continue as needed.       6. Allergic rhinitis, unspecified seasonality, unspecified trigger  Assessment & Plan:  Agree with treatment for allergies.  OTC antihistamines.             Chief Complaint   Patient presents with   • Virtual Brief Visit     Medication follow up   • Virtual Regular Visit         History of Present Illness     Is here to discuss multiple different issues.    Migraine:  Amytrip: Has not been using as no HA, also rare Maxalt.    Anxiety: Not needing Wellbutrin lately.    GERD: Using Omeprazole PRN. Has not taken daily.  Used it for about 2 to 3 months now.    Obesity: Using Zepbound.  Continues to use 2.5mg as had side effects before with higher dose.  They were some confounding issues at the same time as that.        Review of Systems   Constitutional: Negative.    HENT: Negative.     Respiratory: Negative.     Cardiovascular: Negative.    Gastrointestinal: Negative.        Objective   Wt 76.2 kg (168 lb)   BMI 27.53 kg/m²     Physical Exam  Nursing note reviewed.   Constitutional:       General: She is not in acute distress.     Appearance: She is well-developed. She is not ill-appearing.   HENT:      Head: Normocephalic and atraumatic.   Eyes:      Conjunctiva/sclera: Conjunctivae normal.      Pupils: Pupils are equal, round, and reactive to light.   Pulmonary:      Effort: Pulmonary effort is normal.      Breath sounds: Normal breath sounds.         Administrative Statements   Encounter provider Andreas Hardy MD    The Patient is located at Home and in the following state in which I hold an active license [place].    The patient was identified by name and date of birth. Devi Alarcon was  informed that this is a telemedicine visit and that the visit is being conducted through the Epic Embedded platform. She agrees to proceed..  My office door was closed. No one else was in the room.  She acknowledged consent and understanding of privacy and security of the video platform. The patient has agreed to participate and understands they can discontinue the visit at any time.    I have spent a total time of 20 minutes in caring for this patient on the day of the visit/encounter including Diagnostic results, Prognosis, Risks and benefits of tx options, Instructions for management, Patient and family education, Importance of tx compliance, Risk factor reductions, Impressions, Counseling / Coordination of care, Documenting in the medical record, Reviewing/placing orders in the medical record (including tests, medications, and/or procedures), and Obtaining or reviewing history  , not including the time spent for establishing the audio/video connection.

## 2025-04-03 NOTE — ASSESSMENT & PLAN NOTE
Has been OK with not using the Wellbutrin.  Has been noting some hives when increased anxiety.  Would recommend use the Wellbutrin daily, as well as adding Abilify.  Orders:  •  buPROPion (WELLBUTRIN XL) 300 mg 24 hr tablet; Take 1 tablet (300 mg total) by mouth every morning

## 2025-04-18 DIAGNOSIS — E66.811 CLASS 1 OBESITY DUE TO EXCESS CALORIES WITH SERIOUS COMORBIDITY AND BODY MASS INDEX (BMI) OF 30.0 TO 30.9 IN ADULT: Primary | ICD-10-CM

## 2025-04-18 DIAGNOSIS — E66.09 CLASS 1 OBESITY DUE TO EXCESS CALORIES WITH SERIOUS COMORBIDITY AND BODY MASS INDEX (BMI) OF 30.0 TO 30.9 IN ADULT: Primary | ICD-10-CM

## 2025-04-18 RX ORDER — TIRZEPATIDE 7.5 MG/.5ML
7.5 INJECTION, SOLUTION SUBCUTANEOUS WEEKLY
Qty: 2 ML | Refills: 0 | Status: SHIPPED | OUTPATIENT
Start: 2025-04-18

## 2025-04-23 ENCOUNTER — TELEPHONE (OUTPATIENT)
Age: 25
End: 2025-04-23

## 2025-04-23 ENCOUNTER — TELEPHONE (OUTPATIENT)
Dept: OBGYN CLINIC | Facility: MEDICAL CENTER | Age: 25
End: 2025-04-23

## 2025-04-23 NOTE — TELEPHONE ENCOUNTER
PT called in regarding making follow up appointment for reoccurring yeast infections. There was no available appointments till July. Patient wanted to see if we could get her in sooner or if it would be ok to wait till then. Patient is not having symptoms but sometimes will still have it without symptoms so she was looking to get rechecked. Please follow up.

## 2025-04-23 NOTE — TELEPHONE ENCOUNTER
Who called:STAFF     Is the patient Pregnant ? no  If so, How many weeks?     Reason for the Call: Called patient to schedule an appointment for re-occurring yeast infections    Action Taken: Scheduled appointment    Outcome/Plan/ Recommendations: Scheduled appointment

## 2025-04-26 DIAGNOSIS — F31.70 BIPOLAR DISORDER IN FULL REMISSION, MOST RECENT EPISODE UNSPECIFIED TYPE (HCC): ICD-10-CM

## 2025-04-28 ENCOUNTER — TELEPHONE (OUTPATIENT)
Age: 25
End: 2025-04-28

## 2025-04-28 RX ORDER — ARIPIPRAZOLE 2 MG/1
2 TABLET ORAL DAILY
Qty: 90 TABLET | Refills: 1 | OUTPATIENT
Start: 2025-04-28

## 2025-04-28 NOTE — TELEPHONE ENCOUNTER
Requested Prescriptions     Pending Prescriptions Disp Refills    ARIPiprazole (ABILIFY) 2 mg tablet [Pharmacy Med Name: ARIPIPRAZOLE 2 MG TABLET] 90 tablet 1     Sig: TAKE 1 TABLET BY MOUTH EVERY DAY

## 2025-04-28 NOTE — TELEPHONE ENCOUNTER
Patient states she has appointment for office nurse Depo injection 4/29  Patient states she needs added travel time for office appt and her Mom is RN, asking if Mom could administer her Depo?  Patient okay with VMM or Elizabeth msg back today.   HP sent to provider [employer] for patient follow up.

## 2025-04-28 NOTE — TELEPHONE ENCOUNTER
Patient returning Heidi RN phone call. I did inform patient of providers message.patient had no other questions.     ANGELINE Sue       4/28/25 12:39 PM   Recommend Depo injections are given in office.  Thank you

## 2025-04-29 ENCOUNTER — TELEMEDICINE (OUTPATIENT)
Dept: FAMILY MEDICINE CLINIC | Facility: CLINIC | Age: 25
End: 2025-04-29
Payer: COMMERCIAL

## 2025-04-29 ENCOUNTER — CLINICAL SUPPORT (OUTPATIENT)
Dept: OBGYN CLINIC | Facility: MEDICAL CENTER | Age: 25
End: 2025-04-29
Payer: COMMERCIAL

## 2025-04-29 ENCOUNTER — TELEPHONE (OUTPATIENT)
Age: 25
End: 2025-04-29

## 2025-04-29 VITALS — WEIGHT: 157 LBS | BODY MASS INDEX: 26.16 KG/M2 | HEIGHT: 65 IN

## 2025-04-29 DIAGNOSIS — F31.70 BIPOLAR DISORDER IN FULL REMISSION, MOST RECENT EPISODE UNSPECIFIED TYPE (HCC): Primary | ICD-10-CM

## 2025-04-29 DIAGNOSIS — Z30.42 ENCOUNTER FOR DEPO-PROVERA CONTRACEPTION: Primary | ICD-10-CM

## 2025-04-29 DIAGNOSIS — E66.811 CLASS 1 OBESITY DUE TO EXCESS CALORIES WITH SERIOUS COMORBIDITY AND BODY MASS INDEX (BMI) OF 30.0 TO 30.9 IN ADULT: ICD-10-CM

## 2025-04-29 DIAGNOSIS — F41.1 GENERALIZED ANXIETY DISORDER: ICD-10-CM

## 2025-04-29 DIAGNOSIS — E66.09 CLASS 1 OBESITY DUE TO EXCESS CALORIES WITH SERIOUS COMORBIDITY AND BODY MASS INDEX (BMI) OF 30.0 TO 30.9 IN ADULT: ICD-10-CM

## 2025-04-29 PROCEDURE — 96372 THER/PROPH/DIAG INJ SC/IM: CPT

## 2025-04-29 PROCEDURE — 99214 OFFICE O/P EST MOD 30 MIN: CPT | Performed by: FAMILY MEDICINE

## 2025-04-29 RX ORDER — ARIPIPRAZOLE 2 MG/1
2 TABLET ORAL DAILY
Qty: 30 TABLET | Refills: 5 | Status: SHIPPED | OUTPATIENT
Start: 2025-04-29

## 2025-04-29 RX ORDER — MUPIROCIN 20 MG/G
OINTMENT TOPICAL 2 TIMES DAILY
COMMUNITY
Start: 2025-04-29

## 2025-04-29 RX ADMIN — MEDROXYPROGESTERONE ACETATE 150 MG: 150 INJECTION, SUSPENSION INTRAMUSCULAR at 14:36

## 2025-04-29 NOTE — TELEPHONE ENCOUNTER
Pt called to inquire if there are any earlier nurse visits for today in [place] or [place]. She is currently scheduled for her depo injection today at 3:00. Attempted warm transfer, unsuccessful. Please provide a c/b for any sooner appt's. Thank you.

## 2025-04-29 NOTE — PATIENT INSTRUCTIONS
1. Bipolar disorder in full remission, most recent episode unspecified type (HCC)  Assessment & Plan:  Stable at the moment with using the Abilify and Wellbutrin.  No changes are recommended.  As it is working currently, would not make any adjustments.  Can follow-up in the future, approximately 3 months.  Orders:    ARIPiprazole (ABILIFY) 2 mg tablet; Take 1 tablet (2 mg total) by mouth daily    Orders:  -     ARIPiprazole (ABILIFY) 2 mg tablet; Take 1 tablet (2 mg total) by mouth daily  2. Generalized anxiety disorder  Assessment & Plan:  Improved with Wellbutrin and Abilify.  No changes.  Follow-up as scheduled.       3. Class 1 obesity due to excess calories with serious comorbidity and body mass index (BMI) of 30.0 to 30.9 in adult  Assessment & Plan:  Doing very well with the 7.5 mg Zepbound.  Did not need any particular changes.  Feels good overall.  Will continue to follow.  Can continue on this for 6 months and reevaluate.             COVID 19 Instructions    Devi Alarcon was advised to limit contact with others to essential tasks such as getting food, medications, and medical care.    Proper handwashing reviewed, and Hand sanitzer when washing is not available.    If the patient develops symptoms of COVID 19, the patient should call the office as soon as possible.    It is strongly recommended that Flu Vaccinations be obtained.      Virtual Visits:  Emily: This works on smart phones (any phone with Internet browsing capability).  You should get a text message when the provider is ready to see you.  Click on the link in the text message, and the call should start.  You will need to type in your name, and allow camera and microphone access.  This is HIPPA compliant, and secure.      If you have not already done so, get immunized to COVID 19.      We are committed to getting you vaccinated as soon as possible and will be closely following CDC and [place] BI guidelines as they are released and  revised.  Please refer to our COVID-19 vaccine webpage for the most up to date information on the vaccine and our distribution efforts.    This site will also have the most up to date recommendations for COVID booster vaccine.    [email or web address]-19/[email or web address]    Call [phone] ([phone]), option 7    You can also visit https://www.vaccines.gov/ to find vaccines in your area.    OUR LOCATION:    [address], [address]  [place], [place], [address] [phone]  Fax: [phone]    Lab services, Rheumatology, and OB/GYN are at this location as well.

## 2025-04-29 NOTE — ASSESSMENT & PLAN NOTE
Doing very well with the 7.5 mg Zepbound.  Did not need any particular changes.  Feels good overall.  Will continue to follow.  Can continue on this for 6 months and reevaluate.

## 2025-04-29 NOTE — ASSESSMENT & PLAN NOTE
Stable at the moment with using the Abilify and Wellbutrin.  No changes are recommended.  As it is working currently, would not make any adjustments.  Can follow-up in the future, approximately 3 months.  Orders:  •  ARIPiprazole (ABILIFY) 2 mg tablet; Take 1 tablet (2 mg total) by mouth daily

## 2025-04-29 NOTE — PROGRESS NOTES
Virtual Regular VisitName: Devi Alarcon      : 2000      MRN: [id number]  Encounter Provider: Andreas Hardy MD  Encounter Date: 2025   Encounter department: [facility]  :  Assessment & Plan  Bipolar disorder in full remission, most recent episode unspecified type (HCC)  Stable at the moment with using the Abilify and Wellbutrin.  No changes are recommended.  As it is working currently, would not make any adjustments.  Can follow-up in the future, approximately 3 months.  Orders:  •  ARIPiprazole (ABILIFY) 2 mg tablet; Take 1 tablet (2 mg total) by mouth daily    Generalized anxiety disorder  Improved with Wellbutrin and Abilify.  No changes.  Follow-up as scheduled.       Class 1 obesity due to excess calories with serious comorbidity and body mass index (BMI) of 30.0 to 30.9 in adult  Doing very well with the 7.5 mg Zepbound.  Did not need any particular changes.  Feels good overall.  Will continue to follow.  Can continue on this for 6 months and reevaluate.             1. Bipolar disorder in full remission, most recent episode unspecified type (HCC)  Assessment & Plan:  Stable at the moment with using the Abilify and Wellbutrin.  No changes are recommended.  As it is working currently, would not make any adjustments.  Can follow-up in the future, approximately 3 months.  Orders:  •  ARIPiprazole (ABILIFY) 2 mg tablet; Take 1 tablet (2 mg total) by mouth daily    Orders:  -     ARIPiprazole (ABILIFY) 2 mg tablet; Take 1 tablet (2 mg total) by mouth daily  2. Generalized anxiety disorder  Assessment & Plan:  Improved with Wellbutrin and Abilify.  No changes.  Follow-up as scheduled.       3. Class 1 obesity due to excess calories with serious comorbidity and body mass index (BMI) of 30.0 to 30.9 in adult  Assessment & Plan:  Doing very well with the 7.5 mg Zepbound.  Did not need any particular changes.  Feels good overall.  Will continue to follow.  Can continue on  "this for 6 months and reevaluate.             Chief Complaint   Patient presents with   • Virtual Regular Visit     Patient virtual for medication management for abilify 2 mg. Patient also advises she was seen by a dermatology for a type 2 infection under her nose.   • Virtual Regular Visit           History of Present Illness     Here to follow up on multiple issues.  Seems to be doing well with anxiety and bipolar on Abilify and Wellbutrin.    Reviewed about Zepbound usage. On 7.5, no side effects.  She feels at goal weight.          Review of Systems   Constitutional:  Negative for appetite change and fever.   Respiratory:  Negative for chest tightness and shortness of breath.    Cardiovascular:  Negative for chest pain, palpitations and leg swelling.   Gastrointestinal:  Negative for abdominal pain.   Genitourinary:  Negative for difficulty urinating.   Musculoskeletal:  Negative for arthralgias and myalgias.   Skin:  Negative for wound.   Neurological:  Negative for dizziness, light-headedness and headaches.   Psychiatric/Behavioral:  Negative for dysphoric mood and sleep disturbance. The patient is not nervous/anxious.        Objective   Ht 5' 5\" (1.651 m)   Wt 71.2 kg (157 lb)   LMP 04/07/2025   BMI 26.13 kg/m²     Physical Exam  Nursing note reviewed.   Constitutional:       General: She is not in acute distress.     Appearance: She is well-developed. She is not ill-appearing.   HENT:      Head: Normocephalic and atraumatic.   Eyes:      Conjunctiva/sclera: Conjunctivae normal.      Pupils: Pupils are equal, round, and reactive to light.   Pulmonary:      Effort: Pulmonary effort is normal.      Breath sounds: Normal breath sounds.         Administrative Statements   Encounter provider Andreas Hardy MD    The Patient is located at Home and in the following state in which I hold an active license [place].    The patient was identified by name and date of birth. Devi Agnes was informed that this " is a telemedicine visit and that the visit is being conducted through the Epic Embedded platform. She agrees to proceed..  My office door was closed. No one else was in the room.  She acknowledged consent and understanding of privacy and security of the video platform. The patient has agreed to participate and understands they can discontinue the visit at any time.    I have spent a total time of 15 minutes in caring for this patient on the day of the visit/encounter including Diagnostic results, Prognosis, Risks and benefits of tx options, Instructions for management, Patient and family education, Importance of tx compliance, Risk factor reductions, Impressions, Counseling / Coordination of care, Documenting in the medical record, Reviewing/placing orders in the medical record (including tests, medications, and/or procedures), and Obtaining or reviewing history  , not including the time spent for establishing the audio/video connection.

## 2025-04-29 NOTE — PROGRESS NOTES
Patient presents for Depo-Provera injection.  Patient supplied medication.  Patient tolerated IM injection IM right deltoid.  Denied questions or concerns at conclusion of conversation.  Aware repeat injection is due in 3 months.  Encouraged to schedule prior to leaving office at today's visit.  UPT in office today not needed   Lot # [id number] NDC 15683-127-65 Expiration date 04/2027

## 2025-05-22 DIAGNOSIS — F31.70 BIPOLAR DISORDER IN FULL REMISSION, MOST RECENT EPISODE UNSPECIFIED TYPE (HCC): ICD-10-CM

## 2025-05-22 RX ORDER — ARIPIPRAZOLE 2 MG/1
2 TABLET ORAL DAILY
Qty: 90 TABLET | Refills: 0 | Status: SHIPPED | OUTPATIENT
Start: 2025-05-22

## 2025-05-22 NOTE — TELEPHONE ENCOUNTER
Requested Prescriptions     Pending Prescriptions Disp Refills    ARIPiprazole (ABILIFY) 2 mg tablet [Pharmacy Med Name: ARIPIPRAZOLE 2 MG TABLET] 90 tablet 2     Sig: TAKE 1 TABLET BY MOUTH EVERY DAY

## 2025-06-02 DIAGNOSIS — E66.811 CLASS 1 OBESITY DUE TO EXCESS CALORIES WITH SERIOUS COMORBIDITY AND BODY MASS INDEX (BMI) OF 30.0 TO 30.9 IN ADULT: ICD-10-CM

## 2025-06-02 DIAGNOSIS — E66.09 CLASS 1 OBESITY DUE TO EXCESS CALORIES WITH SERIOUS COMORBIDITY AND BODY MASS INDEX (BMI) OF 30.0 TO 30.9 IN ADULT: ICD-10-CM

## 2025-06-02 NOTE — TELEPHONE ENCOUNTER
Medication:  tirzepatide (Zepbound) 7.5 mg/0.5 mL auto-injector    Dose/Frequency: Inject 0.5 mL (7.5 mg total) under the skin once a week     Quantity: 2 mL     Pharmacy: [facility]/pharmacy #[id number] - [place] - [id number] [phone]    Office:   [x] PCP/Provider - Andreas Hardy MD   [] Speciality/Provider -     Does the patient have enough for 3 days? Completely out  [] Yes   [x] No - Send as HP to POD

## 2025-06-06 ENCOUNTER — TELEPHONE (OUTPATIENT)
Age: 25
End: 2025-06-06

## 2025-06-06 NOTE — TELEPHONE ENCOUNTER
Patient was last seen in April with a telemedicine visit. The insurance will need an updated and accurate weight for the patient to renew this medication. Please schedule patient to come to the office to obtain current weight. Thank you.

## 2025-06-06 NOTE — TELEPHONE ENCOUNTER
Patient called inquiring about her recent Zepbound prescription. It was not sent to the pharmacy.    After further evaluation, it appears that a Prior Authorization may be needed.    Please advise and follow up with patient.

## 2025-06-10 RX ORDER — TIRZEPATIDE 7.5 MG/.5ML
7.5 INJECTION, SOLUTION SUBCUTANEOUS WEEKLY
Qty: 2 ML | Refills: 0 | OUTPATIENT
Start: 2025-06-10

## 2025-06-16 ENCOUNTER — TELEPHONE (OUTPATIENT)
Age: 25
End: 2025-06-16

## 2025-06-16 ENCOUNTER — OFFICE VISIT (OUTPATIENT)
Dept: FAMILY MEDICINE CLINIC | Facility: CLINIC | Age: 25
End: 2025-06-16
Payer: COMMERCIAL

## 2025-06-16 VITALS
WEIGHT: 155.6 LBS | BODY MASS INDEX: 25.92 KG/M2 | DIASTOLIC BLOOD PRESSURE: 60 MMHG | OXYGEN SATURATION: 100 % | HEART RATE: 75 BPM | HEIGHT: 65 IN | SYSTOLIC BLOOD PRESSURE: 98 MMHG

## 2025-06-16 DIAGNOSIS — E04.1 THYROID CYST: ICD-10-CM

## 2025-06-16 DIAGNOSIS — E66.9 OBESITY (BMI 30-39.9): Primary | ICD-10-CM

## 2025-06-16 DIAGNOSIS — L01.00 IMPETIGO: ICD-10-CM

## 2025-06-16 PROBLEM — E16.2 HYPOGLYCEMIA, UNSPECIFIED: Status: RESOLVED | Noted: 2024-05-29 | Resolved: 2025-06-16

## 2025-06-16 PROBLEM — E66.3 OVERWEIGHT (BMI 25.0-29.9): Status: RESOLVED | Noted: 2025-06-16 | Resolved: 2025-06-16

## 2025-06-16 PROBLEM — E66.3 OVERWEIGHT (BMI 25.0-29.9): Status: ACTIVE | Noted: 2025-06-16

## 2025-06-16 PROBLEM — U09.9 POST COVID-19 CONDITION, UNSPECIFIED: Status: RESOLVED | Noted: 2024-10-30 | Resolved: 2025-06-16

## 2025-06-16 PROBLEM — T78.40XA ALLERGIC REACTION: Status: RESOLVED | Noted: 2024-09-19 | Resolved: 2025-06-16

## 2025-06-16 PROCEDURE — 99214 OFFICE O/P EST MOD 30 MIN: CPT | Performed by: FAMILY MEDICINE

## 2025-06-16 RX ORDER — TIRZEPATIDE 5 MG/.5ML
5 INJECTION, SOLUTION SUBCUTANEOUS WEEKLY
Qty: 2 ML | Refills: 0 | Status: SHIPPED | OUTPATIENT
Start: 2025-06-16

## 2025-06-16 NOTE — TELEPHONE ENCOUNTER
PA for Zepbound 5MG/0.5ML SUBMITTED to [employer]    via    [x]CMM-KEY: [id number]  []Surescripts-Case ID #   []Availity-Auth ID # NDC #   []Faxed to plan   []Other website   []Phone call Case ID #     [x]PA sent as URGENT    All office notes, labs and other pertaining documents and studies sent. Clinical questions answered. Awaiting determination from insurance company.     Turnaround time for your insurance to make a decision on your Prior Authorization can take 7-21 business days.

## 2025-06-16 NOTE — ASSESSMENT & PLAN NOTE
No history of thyroid cancer.  Patient did have a thyroid ultrasound this fall which showed complex cyst no tumor

## 2025-06-16 NOTE — PROGRESS NOTES
"Name: Devi Alarcon      : 2000      MRN: [id number]  Encounter Provider: Barney Mendoza DO  Encounter Date: 2025   Encounter department: [facility] PRIMARY [facility]ARE  Recheck with Dr. Hardy in 4 weeks  :  Assessment & Plan  Obesity (BMI 30-39.9)   patient initial BMI was 30.76.  Patient was worked up to a dosing of 7.5 mg daily.  Current BMI is 25.89 patient is lost 32 pounds.  Patient has been off of Zepbound for about 4 weeks will restart at 5 mg every week.  Patient was advised this may or may not be covered by insurance with her current BMI  Orders:  •  Zepbound 5 MG/0.5ML auto-injector; Inject 0.5 mL (5 mg total) under the skin once a week    Thyroid cyst  No history of thyroid cancer.  Patient did have a thyroid ultrasound this fall which showed complex cyst no tumor       Impetigo  Continue Bactroban ointment Augmentin was ordered return in 1 week if no improvement 2 weeks if still with symptoms  Orders:  •  amoxicillin-clavulanate (AUGMENTIN) 875-125 mg per tablet; Take 1 tablet by mouth every 12 (twelve) hours for 10 days           History of Present Illness   Patient started with a weight of 187 went through Zepbound up to 7.5 mg has lost 32 pounds however patient has not had Zepbound for the last over 4 weeks we will go down to 5 mg every week for a month then back to 7.5      Review of Systems   Constitutional:         HPI   HENT: Negative.     Eyes: Negative.    Respiratory: Negative.     Cardiovascular: Negative.    Gastrointestinal: Negative.    Endocrine: Negative.    Genitourinary: Negative.    Musculoskeletal: Negative.    Skin:         Red area under her right nasal passage using Bactroban with slow improvement   Allergic/Immunologic: Negative.    Neurological: Negative.    Hematological: Negative.    Psychiatric/Behavioral: Negative.         Objective   BP 98/60 (BP Location: Right arm, Patient Position: Sitting, Cuff Size: Standard)   Pulse 75   Ht 5' 5\" (1.651 m)   " Wt 70.6 kg (155 lb 9.6 oz)   SpO2 100%   BMI 25.89 kg/m²      Physical Exam  Vitals and nursing note reviewed.   HENT:      Head: Normocephalic and atraumatic.      Mouth/Throat:      Mouth: Mucous membranes are moist.     Eyes:      General: No scleral icterus.    Neck:      Vascular: No carotid bruit.     Cardiovascular:      Rate and Rhythm: Normal rate and regular rhythm.      Heart sounds: Normal heart sounds.   Pulmonary:      Effort: Pulmonary effort is normal.      Breath sounds: Normal breath sounds.   Abdominal:      Palpations: Abdomen is soft.      Tenderness: There is no abdominal tenderness.     Musculoskeletal:      Cervical back: Neck supple.      Right lower leg: No edema.      Left lower leg: No edema.     Skin:     General: Skin is warm and dry.      Findings: Erythema present.      Comments: Under the right nostril mild area of erythema typical of impetigo     Neurological:      General: No focal deficit present.      Mental Status: She is alert.     Psychiatric:         Mood and Affect: Mood normal.

## 2025-06-16 NOTE — PATIENT INSTRUCTIONS
Restart Zepbound at 5 mg every week for 4 weeks  Continue mucoperiosteal/Bactroban ointment 3 times daily to the nasal area  Finish Augmentin 1 twice a day with food for 10 days  Return with Dr. Hardy in 4 weeks

## 2025-06-16 NOTE — ASSESSMENT & PLAN NOTE
patient initial BMI was 30.76.  Patient was worked up to a dosing of 7.5 mg daily.  Current BMI is 25.89 patient is lost 32 pounds.  Patient has been off of Zepbound for about 4 weeks will restart at 5 mg every week.  Patient was advised this may or may not be covered by insurance with her current BMI  Orders:  •  Zepbound 5 MG/0.5ML auto-injector; Inject 0.5 mL (5 mg total) under the skin once a week

## 2025-06-17 NOTE — TELEPHONE ENCOUNTER
PA for Zepbound 5MG/0.5ML CANCELLED due to     [x]Approval on file-dates approved 02/07/2025-08/07/2025  []Medication already on Formulary  []Brand Name Preferred  []Patient no longer covered by insurance  []Therapy Changed/Medication Discontinued    Scanned into Media  yes

## 2025-06-25 DIAGNOSIS — G43.809 OTHER MIGRAINE WITHOUT STATUS MIGRAINOSUS, NOT INTRACTABLE: ICD-10-CM

## 2025-06-26 RX ORDER — RIZATRIPTAN BENZOATE 10 MG/1
TABLET, ORALLY DISINTEGRATING ORAL
Qty: 9 TABLET | Refills: 2 | Status: SHIPPED | OUTPATIENT
Start: 2025-06-26

## 2025-07-16 ENCOUNTER — CLINICAL SUPPORT (OUTPATIENT)
Dept: OBGYN CLINIC | Facility: MEDICAL CENTER | Age: 25
End: 2025-07-16
Payer: COMMERCIAL

## 2025-07-16 DIAGNOSIS — F31.70 BIPOLAR DISORDER IN FULL REMISSION, MOST RECENT EPISODE UNSPECIFIED TYPE (HCC): ICD-10-CM

## 2025-07-16 DIAGNOSIS — Z30.42 ENCOUNTER FOR DEPO-PROVERA CONTRACEPTION: Primary | ICD-10-CM

## 2025-07-16 PROCEDURE — 96372 THER/PROPH/DIAG INJ SC/IM: CPT

## 2025-07-16 RX ADMIN — MEDROXYPROGESTERONE ACETATE 150 MG: 150 INJECTION, SUSPENSION INTRAMUSCULAR at 10:19

## 2025-07-16 NOTE — TELEPHONE ENCOUNTER
Medication: (ABILIFY     Dose/Frequency: TAKE 1 TABLET BY MOUTH EVERY DAY     Quantity: 90    Pharmacy: [facility] on file    Office:   [x] PCP/Provider -   [] Speciality/Provider -     Does the patient have enough for 3 days?   [x] Yes - Patient picked up her last refill today.  [] No - Send as HP to POD

## 2025-07-16 NOTE — PROGRESS NOTES
Patient presented for depo injection    Patient supplied left deltoid patient tolerated well    Lot#[id number]    NDC:23891-831-60    Exp:04/2027

## 2025-07-17 RX ORDER — ARIPIPRAZOLE 2 MG/1
2 TABLET ORAL DAILY
Qty: 90 TABLET | Refills: 0 | Status: SHIPPED | OUTPATIENT
Start: 2025-07-17

## 2025-07-21 ENCOUNTER — OFFICE VISIT (OUTPATIENT)
Dept: FAMILY MEDICINE CLINIC | Facility: CLINIC | Age: 25
End: 2025-07-21
Payer: COMMERCIAL

## 2025-07-21 ENCOUNTER — APPOINTMENT (OUTPATIENT)
Dept: LAB | Facility: CLINIC | Age: 25
End: 2025-07-21
Payer: COMMERCIAL

## 2025-07-21 VITALS
HEART RATE: 73 BPM | BODY MASS INDEX: 25.72 KG/M2 | DIASTOLIC BLOOD PRESSURE: 80 MMHG | WEIGHT: 154.4 LBS | SYSTOLIC BLOOD PRESSURE: 102 MMHG | HEIGHT: 65 IN | OXYGEN SATURATION: 100 %

## 2025-07-21 DIAGNOSIS — R59.0 CERVICAL ADENOPATHY: ICD-10-CM

## 2025-07-21 DIAGNOSIS — E66.3 OVERWEIGHT (BMI 25.0-29.9): ICD-10-CM

## 2025-07-21 DIAGNOSIS — G43.809 OTHER MIGRAINE WITHOUT STATUS MIGRAINOSUS, NOT INTRACTABLE: ICD-10-CM

## 2025-07-21 DIAGNOSIS — E04.1 THYROID CYST: ICD-10-CM

## 2025-07-21 DIAGNOSIS — L65.9 HAIR LOSS: ICD-10-CM

## 2025-07-21 DIAGNOSIS — K21.9 GASTROESOPHAGEAL REFLUX DISEASE, UNSPECIFIED WHETHER ESOPHAGITIS PRESENT: Primary | ICD-10-CM

## 2025-07-21 LAB
ALBUMIN SERPL BCG-MCNC: 4.3 G/DL (ref 3.5–5)
ALP SERPL-CCNC: 58 U/L (ref 34–104)
ALT SERPL W P-5'-P-CCNC: 7 U/L (ref 7–52)
ANION GAP SERPL CALCULATED.3IONS-SCNC: 8 MMOL/L (ref 4–13)
AST SERPL W P-5'-P-CCNC: 14 U/L (ref 13–39)
BASOPHILS # BLD AUTO: 0.02 THOUSANDS/ÂΜL (ref 0–0.1)
BASOPHILS NFR BLD AUTO: 0 % (ref 0–1)
BILIRUB SERPL-MCNC: 0.33 MG/DL (ref 0.2–1)
BUN SERPL-MCNC: 12 MG/DL (ref 5–25)
CALCIUM SERPL-MCNC: 9 MG/DL (ref 8.4–10.2)
CHLORIDE SERPL-SCNC: 107 MMOL/L (ref 96–108)
CO2 SERPL-SCNC: 24 MMOL/L (ref 21–32)
CREAT SERPL-MCNC: 0.68 MG/DL (ref 0.6–1.3)
EOSINOPHIL # BLD AUTO: 0.06 THOUSAND/ÂΜL (ref 0–0.61)
EOSINOPHIL NFR BLD AUTO: 1 % (ref 0–6)
ERYTHROCYTE [DISTWIDTH] IN BLOOD BY AUTOMATED COUNT: 12.6 % (ref 11.6–15.1)
GFR SERPL CREATININE-BSD FRML MDRD: 123 ML/MIN/1.73SQ M
GLUCOSE P FAST SERPL-MCNC: 79 MG/DL (ref 65–99)
HCT VFR BLD AUTO: 34.5 % (ref 34.8–46.1)
HGB BLD-MCNC: 11.4 G/DL (ref 11.5–15.4)
IMM GRANULOCYTES # BLD AUTO: 0.02 THOUSAND/UL (ref 0–0.2)
IMM GRANULOCYTES NFR BLD AUTO: 0 % (ref 0–2)
LYMPHOCYTES # BLD AUTO: 1.9 THOUSANDS/ÂΜL (ref 0.6–4.47)
LYMPHOCYTES NFR BLD AUTO: 36 % (ref 14–44)
MCH RBC QN AUTO: 30.4 PG (ref 26.8–34.3)
MCHC RBC AUTO-ENTMCNC: 33 G/DL (ref 31.4–37.4)
MCV RBC AUTO: 92 FL (ref 82–98)
MONOCYTES # BLD AUTO: 0.48 THOUSAND/ÂΜL (ref 0.17–1.22)
MONOCYTES NFR BLD AUTO: 9 % (ref 4–12)
NEUTROPHILS # BLD AUTO: 2.84 THOUSANDS/ÂΜL (ref 1.85–7.62)
NEUTS SEG NFR BLD AUTO: 54 % (ref 43–75)
NRBC BLD AUTO-RTO: 0 /100 WBCS
PLATELET # BLD AUTO: 221 THOUSANDS/UL (ref 149–390)
PMV BLD AUTO: 9.7 FL (ref 8.9–12.7)
POTASSIUM SERPL-SCNC: 4 MMOL/L (ref 3.5–5.3)
PROT SERPL-MCNC: 6.6 G/DL (ref 6.4–8.4)
RBC # BLD AUTO: 3.75 MILLION/UL (ref 3.81–5.12)
SODIUM SERPL-SCNC: 139 MMOL/L (ref 135–147)
TSH SERPL DL<=0.05 MIU/L-ACNC: 2.58 UIU/ML (ref 0.45–4.5)
WBC # BLD AUTO: 5.32 THOUSAND/UL (ref 4.31–10.16)

## 2025-07-21 PROCEDURE — 99214 OFFICE O/P EST MOD 30 MIN: CPT | Performed by: FAMILY MEDICINE

## 2025-07-21 PROCEDURE — 36415 COLL VENOUS BLD VENIPUNCTURE: CPT

## 2025-07-21 PROCEDURE — 80053 COMPREHEN METABOLIC PANEL: CPT

## 2025-07-21 PROCEDURE — 84443 ASSAY THYROID STIM HORMONE: CPT

## 2025-07-21 PROCEDURE — 85025 COMPLETE CBC W/AUTO DIFF WBC: CPT

## 2025-07-21 NOTE — PROGRESS NOTES
Name: Devi Alarcon      : 2000      MRN: [id number]  Encounter Provider: Andreas Hardy MD  Encounter Date: 2025   Encounter department: [facility]  :  Assessment & Plan  Gastroesophageal reflux disease, unspecified whether esophagitis present  Stable at the moment.  No specific changes recommended.       Thyroid cyst  Thyroid cyst noted in the past.  Recommend TSH, ultrasound in the future.  Was recommended to repeat in 2025.  Orders:  •  TSH, 3rd generation; Future  •  US thyroid; Future    Overweight (BMI 25.0-29.9)  Significant decrease in weight.  Continue on Zepbound.  Tolerating at the moment.         Other migraine without status migrainosus, not intractable  Stable at the moment.  She has had some increasing symptoms lately.  Check laboratory studies.      Orders:  •  CBC and differential; Future    Hair loss  Significant mount of hair loss lately.  This could be related to medication, such as the Zepbound, but also could be related to thyroid issues.  She already has thyroid cyst.  Check TSH, comprehensive.  Orders:  •  TSH, 3rd generation; Future  •  Comprehensive metabolic panel; Future    Cervical adenopathy  Patient has some irritation and lumps in the neck.  This been present for at least 5 weeks.  Has not gotten better.  Check labs, CT scan.  Orders:  •  CT soft tissue neck w contrast; Future  •  CBC and differential; Future  •  Comprehensive metabolic panel; Future          1. Gastroesophageal reflux disease, unspecified whether esophagitis present  Assessment & Plan:  Stable at the moment.  No specific changes recommended.       2. Thyroid cyst  Assessment & Plan:  Thyroid cyst noted in the past.  Recommend TSH, ultrasound in the future.  Was recommended to repeat in 2025.  Orders:  •  TSH, 3rd generation; Future  •  US thyroid; Future    Orders:  -     TSH, 3rd generation; Future; Expected date: 2025  -     US thyroid; Future;  "Expected date: 10/21/2025  3. Overweight (BMI 25.0-29.9)  Assessment & Plan:  Significant decrease in weight.  Continue on Zepbound.  Tolerating at the moment.         4. Other migraine without status migrainosus, not intractable  Assessment & Plan:  Stable at the moment.  She has had some increasing symptoms lately.  Check laboratory studies.      Orders:  •  CBC and differential; Future    Orders:  -     CBC and differential; Future  5. Hair loss  -     TSH, 3rd generation; Future; Expected date: 07/21/2025  -     Comprehensive metabolic panel; Future; Expected date: 07/21/2025  6. Cervical adenopathy  -     CT soft tissue neck w contrast; Future; Expected date: 07/21/2025  -     CBC and differential; Future  -     Comprehensive metabolic panel; Future; Expected date: 07/21/2025    Chief Complaint   Patient presents with   • Swollen Glands     Onset for 5 wks. Pt states having hair loss , fatigue and headaches pt not sure if it symptoms  from the swollen glands            History of Present Illness   Patient is here for follow-up of multiple issues.  Please see chief complaint.    Starting about 5 weeks ago, noted irritation in the neck.  Has not improved over that time.  She wondered about this.    Has had hair loss over this timeframe as well.  Significant amount before.    Migraine: Noting more lately off medications.  Increased fatigue as well.          Review of Systems   Constitutional:  Positive for fatigue.   HENT: Negative.     Respiratory: Negative.     Cardiovascular: Negative.    Gastrointestinal: Negative.    Skin:         Hair loss and irritation.         Objective   /80 (BP Location: Right arm, Patient Position: Sitting, Cuff Size: Standard)   Pulse 73   Ht 5' 5\" (1.651 m)   Wt 70 kg (154 lb 6.4 oz)   SpO2 100%   BMI 25.69 kg/m²      Physical Exam  Vitals and nursing note reviewed.   Constitutional:       Appearance: Normal appearance. She is well-developed.   HENT:      Head: " Normocephalic and atraumatic.   Neck:      Thyroid: No thyroid mass, thyromegaly or thyroid tenderness.       Cardiovascular:      Rate and Rhythm: Normal rate and regular rhythm.      Pulses:           Carotid pulses are 2+ on the right side and 2+ on the left side.     Heart sounds: Normal heart sounds.   Pulmonary:      Effort: Pulmonary effort is normal.      Breath sounds: Normal breath sounds. No wheezing or rales.   Chest:      Chest wall: No tenderness.   Lymphadenopathy:      Cervical: Cervical adenopathy (minimal noted.) present.     Neurological:      General: No focal deficit present.      Mental Status: She is alert.      Cranial Nerves: Cranial nerves 2-12 are intact.      Sensory: Sensation is intact.      Motor: Motor function is intact.      Coordination: Coordination is intact.      Gait: Gait is intact.

## 2025-07-21 NOTE — ASSESSMENT & PLAN NOTE
Thyroid cyst noted in the past.  Recommend TSH, ultrasound in the future.  Was recommended to repeat in November 2025.  Orders:  •  TSH, 3rd generation; Future  •  US thyroid; Future

## 2025-07-21 NOTE — ASSESSMENT & PLAN NOTE
Stable at the moment.  She has had some increasing symptoms lately.  Check laboratory studies.      Orders:  •  CBC and differential; Future

## 2025-07-21 NOTE — PATIENT INSTRUCTIONS
1. Gastroesophageal reflux disease, unspecified whether esophagitis present  Assessment & Plan:  Stable at the moment.  No specific changes recommended.       2. Thyroid cyst  Assessment & Plan:  Thyroid cyst noted in the past.  Recommend TSH, ultrasound in the future.  Was recommended to repeat in November 2025.  Orders:    TSH, 3rd generation; Future    US thyroid; Future    Orders:  -     TSH, 3rd generation; Future; Expected date: 07/21/2025  -     US thyroid; Future; Expected date: 10/21/2025  3. Overweight (BMI 25.0-29.9)  Assessment & Plan:  Significant decrease in weight.  Continue on Zepbound.  Tolerating at the moment.         4. Other migraine without status migrainosus, not intractable  Assessment & Plan:  Stable at the moment.  She has had some increasing symptoms lately.  Check laboratory studies.      Orders:    CBC and differential; Future    Orders:  -     CBC and differential; Future  5. Hair loss  -     TSH, 3rd generation; Future; Expected date: 07/21/2025  -     Comprehensive metabolic panel; Future; Expected date: 07/21/2025  6. Cervical adenopathy  -     CT soft tissue neck w contrast; Future; Expected date: 07/21/2025  -     CBC and differential; Future  -     Comprehensive metabolic panel; Future; Expected date: 07/21/2025      COVID 19 Instructions    Devi Alarcon was advised to limit contact with others to essential tasks such as getting food, medications, and medical care.    Proper handwashing reviewed, and Hand sanitzer when washing is not available.    If the patient develops symptoms of COVID 19, the patient should call the office as soon as possible.    It is strongly recommended that Flu Vaccinations be obtained.      Virtual Visits:  You should get a text message when the provider is ready to see you.  Click on the link in the text message, and the call should start.  Make sure you allow camera and microphone access.  This is HIPPA compliant, and secure.  Also, you could access  this visit from [facility] in the [facility] Mobile nadia.      If you have not already done so, get immunized to COVID 19.      We are committed to getting you vaccinated as soon as possible and will be closely following CDC and [facility] guidelines as they are released and revised.  Please refer to our COVID-19 vaccine webpage for the most up to date information on the vaccine and our distribution efforts.    This site will also have the most up to date recommendations for COVID booster vaccine.    [email or web address]    Call [phone] ([phone]), option 7    You can also visit https://www.vaccines.gov/ to find vaccines in your area.    OUR LOCATION:    [facility]  [address] [place] [address], [address]  [place], [place], [address] [phone]  Fax: [phone]    Lab services, Rheumatology, and OB/GYN are at this location as well.